# Patient Record
Sex: FEMALE | Race: WHITE | NOT HISPANIC OR LATINO
[De-identification: names, ages, dates, MRNs, and addresses within clinical notes are randomized per-mention and may not be internally consistent; named-entity substitution may affect disease eponyms.]

---

## 2017-01-03 ENCOUNTER — RX RENEWAL (OUTPATIENT)
Age: 82
End: 2017-01-03

## 2017-05-05 ENCOUNTER — APPOINTMENT (OUTPATIENT)
Dept: FAMILY MEDICINE | Facility: CLINIC | Age: 82
End: 2017-05-05

## 2017-05-05 VITALS
TEMPERATURE: 98.7 F | DIASTOLIC BLOOD PRESSURE: 68 MMHG | HEART RATE: 72 BPM | OXYGEN SATURATION: 96 % | SYSTOLIC BLOOD PRESSURE: 140 MMHG

## 2017-05-05 DIAGNOSIS — M81.0 AGE-RELATED OSTEOPOROSIS W/OUT CURRENT PATHOLOGICAL FRACTURE: ICD-10-CM

## 2017-05-09 ENCOUNTER — RESULT REVIEW (OUTPATIENT)
Age: 82
End: 2017-05-09

## 2017-05-09 LAB
25(OH)D3 SERPL-MCNC: 18.8 NG/ML
ALBUMIN SERPL ELPH-MCNC: 3.9 G/DL
ALP BLD-CCNC: 87 U/L
ALT SERPL-CCNC: 11 U/L
ANION GAP SERPL CALC-SCNC: 11 MMOL/L
AST SERPL-CCNC: 25 U/L
BASOPHILS # BLD AUTO: 0.05 K/UL
BASOPHILS NFR BLD AUTO: 1 %
BILIRUB SERPL-MCNC: 0.4 MG/DL
BUN SERPL-MCNC: 20 MG/DL
CALCIUM SERPL-MCNC: 9.3 MG/DL
CHLORIDE SERPL-SCNC: 103 MMOL/L
CHOLEST SERPL-MCNC: 206 MG/DL
CHOLEST/HDLC SERPL: 2.7 RATIO
CO2 SERPL-SCNC: 27 MMOL/L
CREAT SERPL-MCNC: 0.93 MG/DL
EOSINOPHIL # BLD AUTO: 0.16 K/UL
EOSINOPHIL NFR BLD AUTO: 3.2 %
FERRITIN SERPL-MCNC: 52.7 NG/ML
FOLATE SERPL-MCNC: >20 NG/ML
GLUCOSE SERPL-MCNC: 102 MG/DL
HBA1C MFR BLD HPLC: 6 %
HCT VFR BLD CALC: 40.1 %
HDLC SERPL-MCNC: 77 MG/DL
HGB BLD-MCNC: 11.9 G/DL
IMM GRANULOCYTES NFR BLD AUTO: 0.2 %
IRON SATN MFR SERPL: 13 %
IRON SERPL-MCNC: 33 UG/DL
LDLC SERPL CALC-MCNC: 109 MG/DL
LYMPHOCYTES # BLD AUTO: 1.76 K/UL
LYMPHOCYTES NFR BLD AUTO: 35.1 %
MAN DIFF?: NORMAL
MCHC RBC-ENTMCNC: 26.2 PG
MCHC RBC-ENTMCNC: 29.7 GM/DL
MCV RBC AUTO: 88.3 FL
MONOCYTES # BLD AUTO: 0.61 K/UL
MONOCYTES NFR BLD AUTO: 12.2 %
NEUTROPHILS # BLD AUTO: 2.42 K/UL
NEUTROPHILS NFR BLD AUTO: 48.3 %
PLATELET # BLD AUTO: 232 K/UL
POTASSIUM SERPL-SCNC: 4.7 MMOL/L
PROT SERPL-MCNC: 8 G/DL
RBC # BLD: 4.54 M/UL
RBC # FLD: 17.4 %
SODIUM SERPL-SCNC: 141 MMOL/L
TIBC SERPL-MCNC: 252 UG/DL
TRIGL SERPL-MCNC: 99 MG/DL
TSH SERPL-ACNC: 3.97 UIU/ML
UIBC SERPL-MCNC: 219 UG/DL
VIT B12 SERPL-MCNC: 351 PG/ML
WBC # FLD AUTO: 5.01 K/UL

## 2017-05-19 ENCOUNTER — INPATIENT (INPATIENT)
Facility: HOSPITAL | Age: 82
LOS: 2 days | Discharge: HOME | DRG: 689 | End: 2017-05-22
Attending: HOSPITALIST | Admitting: HOSPITALIST
Payer: MEDICARE

## 2017-05-19 VITALS
TEMPERATURE: 98 F | OXYGEN SATURATION: 99 % | DIASTOLIC BLOOD PRESSURE: 71 MMHG | HEIGHT: 65 IN | WEIGHT: 184.97 LBS | SYSTOLIC BLOOD PRESSURE: 176 MMHG | RESPIRATION RATE: 18 BRPM | HEART RATE: 91 BPM

## 2017-05-19 LAB
ALBUMIN SERPL ELPH-MCNC: 3.5 G/DL — SIGNIFICANT CHANGE UP (ref 3.3–5.2)
ALP SERPL-CCNC: 80 U/L — SIGNIFICANT CHANGE UP (ref 40–120)
ALT FLD-CCNC: 16 U/L — SIGNIFICANT CHANGE UP
ANION GAP SERPL CALC-SCNC: 15 MMOL/L — SIGNIFICANT CHANGE UP (ref 5–17)
APPEARANCE UR: ABNORMAL
AST SERPL-CCNC: 63 U/L — HIGH
BACTERIA # UR AUTO: ABNORMAL
BASOPHILS # BLD AUTO: 0 K/UL — SIGNIFICANT CHANGE UP (ref 0–0.2)
BASOPHILS NFR BLD AUTO: 0.1 % — SIGNIFICANT CHANGE UP (ref 0–2)
BILIRUB SERPL-MCNC: 0.6 MG/DL — SIGNIFICANT CHANGE UP (ref 0.4–2)
BILIRUB UR-MCNC: NEGATIVE — SIGNIFICANT CHANGE UP
BUN SERPL-MCNC: 15 MG/DL — SIGNIFICANT CHANGE UP (ref 8–20)
CALCIUM SERPL-MCNC: 8.4 MG/DL — LOW (ref 8.6–10.2)
CHLORIDE SERPL-SCNC: 96 MMOL/L — LOW (ref 98–107)
CO2 SERPL-SCNC: 23 MMOL/L — SIGNIFICANT CHANGE UP (ref 22–29)
COLOR SPEC: YELLOW — SIGNIFICANT CHANGE UP
CREAT SERPL-MCNC: 0.89 MG/DL — SIGNIFICANT CHANGE UP (ref 0.5–1.3)
DIFF PNL FLD: ABNORMAL
EPI CELLS # UR: ABNORMAL
GLUCOSE SERPL-MCNC: 119 MG/DL — HIGH (ref 70–115)
GLUCOSE UR QL: NEGATIVE MG/DL — SIGNIFICANT CHANGE UP
GRAN CASTS # UR COMP ASSIST: ABNORMAL /LPF
HCT VFR BLD CALC: 38.9 % — SIGNIFICANT CHANGE UP (ref 37–47)
HGB BLD-MCNC: 12.4 G/DL — SIGNIFICANT CHANGE UP (ref 12–16)
HYALINE CASTS # UR AUTO: ABNORMAL /LPF
KETONES UR-MCNC: ABNORMAL
LACTATE BLDV-MCNC: 1.3 MMOL/L — SIGNIFICANT CHANGE UP (ref 0.5–2)
LEUKOCYTE ESTERASE UR-ACNC: ABNORMAL
LYMPHOCYTES # BLD AUTO: 1 K/UL — SIGNIFICANT CHANGE UP (ref 1–4.8)
LYMPHOCYTES # BLD AUTO: 14.5 % — LOW (ref 20–55)
MCHC RBC-ENTMCNC: 27.3 PG — SIGNIFICANT CHANGE UP (ref 27–31)
MCHC RBC-ENTMCNC: 31.9 G/DL — LOW (ref 32–36)
MCV RBC AUTO: 85.5 FL — SIGNIFICANT CHANGE UP (ref 81–99)
MONOCYTES # BLD AUTO: 0.9 K/UL — HIGH (ref 0–0.8)
MONOCYTES NFR BLD AUTO: 12.4 % — HIGH (ref 3–10)
NEUTROPHILS # BLD AUTO: 5.2 K/UL — SIGNIFICANT CHANGE UP (ref 1.8–8)
NEUTROPHILS NFR BLD AUTO: 72.7 % — SIGNIFICANT CHANGE UP (ref 37–73)
NITRITE UR-MCNC: NEGATIVE — SIGNIFICANT CHANGE UP
PH UR: 5 — SIGNIFICANT CHANGE UP (ref 5–8)
PLATELET # BLD AUTO: 170 K/UL — SIGNIFICANT CHANGE UP (ref 150–400)
POTASSIUM SERPL-MCNC: 3.9 MMOL/L — SIGNIFICANT CHANGE UP (ref 3.5–5.3)
POTASSIUM SERPL-SCNC: 3.9 MMOL/L — SIGNIFICANT CHANGE UP (ref 3.5–5.3)
PROT SERPL-MCNC: 7.7 G/DL — SIGNIFICANT CHANGE UP (ref 6.6–8.7)
PROT UR-MCNC: 100 MG/DL
RBC # BLD: 4.55 M/UL — SIGNIFICANT CHANGE UP (ref 4.4–5.2)
RBC # FLD: 17.1 % — HIGH (ref 11–15.6)
RBC CASTS # UR COMP ASSIST: ABNORMAL /HPF (ref 0–4)
SODIUM SERPL-SCNC: 134 MMOL/L — LOW (ref 135–145)
SP GR SPEC: 1.02 — SIGNIFICANT CHANGE UP (ref 1.01–1.02)
UROBILINOGEN FLD QL: NEGATIVE MG/DL — SIGNIFICANT CHANGE UP
WBC # BLD: 7.1 K/UL — SIGNIFICANT CHANGE UP (ref 4.8–10.8)
WBC # FLD AUTO: 7.1 K/UL — SIGNIFICANT CHANGE UP (ref 4.8–10.8)
WBC UR QL: ABNORMAL

## 2017-05-19 PROCEDURE — 70450 CT HEAD/BRAIN W/O DYE: CPT | Mod: 26

## 2017-05-19 PROCEDURE — 72125 CT NECK SPINE W/O DYE: CPT | Mod: 26

## 2017-05-19 PROCEDURE — 73110 X-RAY EXAM OF WRIST: CPT | Mod: 26,RT

## 2017-05-19 PROCEDURE — 93010 ELECTROCARDIOGRAM REPORT: CPT

## 2017-05-19 PROCEDURE — 73090 X-RAY EXAM OF FOREARM: CPT | Mod: 26,LT

## 2017-05-19 PROCEDURE — 71010: CPT | Mod: 26

## 2017-05-19 RX ORDER — SODIUM CHLORIDE 9 MG/ML
1000 INJECTION INTRAMUSCULAR; INTRAVENOUS; SUBCUTANEOUS ONCE
Qty: 0 | Refills: 0 | Status: COMPLETED | OUTPATIENT
Start: 2017-05-19 | End: 2017-05-19

## 2017-05-19 RX ORDER — ASPIRIN/CALCIUM CARB/MAGNESIUM 324 MG
324 TABLET ORAL DAILY
Qty: 0 | Refills: 0 | Status: DISCONTINUED | OUTPATIENT
Start: 2017-05-19 | End: 2017-05-21

## 2017-05-19 RX ORDER — SODIUM CHLORIDE 9 MG/ML
3 INJECTION INTRAMUSCULAR; INTRAVENOUS; SUBCUTANEOUS ONCE
Qty: 0 | Refills: 0 | Status: COMPLETED | OUTPATIENT
Start: 2017-05-19 | End: 2017-05-19

## 2017-05-19 RX ORDER — SODIUM CHLORIDE 9 MG/ML
1000 INJECTION INTRAMUSCULAR; INTRAVENOUS; SUBCUTANEOUS
Qty: 0 | Refills: 0 | Status: DISCONTINUED | OUTPATIENT
Start: 2017-05-19 | End: 2017-05-21

## 2017-05-19 RX ADMIN — SODIUM CHLORIDE 3 MILLILITER(S): 9 INJECTION INTRAMUSCULAR; INTRAVENOUS; SUBCUTANEOUS at 21:22

## 2017-05-19 RX ADMIN — SODIUM CHLORIDE 1000 MILLILITER(S): 9 INJECTION INTRAMUSCULAR; INTRAVENOUS; SUBCUTANEOUS at 21:57

## 2017-05-19 RX ADMIN — SODIUM CHLORIDE 150 MILLILITER(S): 9 INJECTION INTRAMUSCULAR; INTRAVENOUS; SUBCUTANEOUS at 21:57

## 2017-05-19 NOTE — ED ADULT NURSE NOTE - OBJECTIVE STATEMENT
Assumed patient care at 2100.  Pt reports losing her balance in the bathroom today.  She fell forward into the tub.  She could not get up for four hours. She denies hitting her head and denies LOC.  She is c/o right wrist pain and left scapula pain.  Has had diarrhea for the last three days and a 101.0 temp this morning.  Clear bilateral lung sounds.  Ecchymosis present to right wrist with no swelling.  No signs of injury present to left scapula.  Moves all four extremities without difficulty.

## 2017-05-19 NOTE — ED PROVIDER NOTE - NS ED MD SCRIBE ATTENDING SCRIBE SECTIONS
HISTORY OF PRESENT ILLNESS/VITAL SIGNS( Pullset)/REVIEW OF SYSTEMS/PAST MEDICAL/SURGICAL/SOCIAL HISTORY/DISPOSITION/PHYSICAL EXAM

## 2017-05-19 NOTE — ED ADULT TRIAGE NOTE - CHIEF COMPLAINT QUOTE
States after getting off the toilet, slipped and fell into bath tub. Denies head injury, or visible external trauma present. C/O right wrist. Denies blood thinners. GCS 15. NOE with strength and purpose. Denies HA or visual complaints. Respirations even and unlabored. Denies neck or head pain.

## 2017-05-19 NOTE — ED PROVIDER NOTE - OBJECTIVE STATEMENT
89 y/o F pt with PMHx of CHF and CAD presents to ED c/o right wrist pain s/p mechanical fall earlier today. As per daughter, she was in the bathroom and got up too fast and fell into the bathtub. Pt reports she did not hit her head and denies LOC. She currently takes Lasix and Baby Aspirin daily. As per son, she has had diarrhea x3 days and a 101 fever this am. She also c/o b/l leg weakness. Pt denies CP, SOB, nausea, vomiting, abdominal pain, back pain, and headache. No further complaints at this time. NKDA.

## 2017-05-19 NOTE — ED ADULT NURSE NOTE - PMH
CHF (congestive heart failure)    Coronary artery disease involving native coronary artery of native heart without angina pectoris

## 2017-05-20 DIAGNOSIS — I21.4 NON-ST ELEVATION (NSTEMI) MYOCARDIAL INFARCTION: ICD-10-CM

## 2017-05-20 LAB
C DIFF BY PCR RESULT: DETECTED
C DIFF TOX GENS STL QL NAA+PROBE: SIGNIFICANT CHANGE UP
CK MB CFR SERPL CALC: 12 NG/ML — HIGH (ref 0–6.7)
CK MB CFR SERPL CALC: 12.9 NG/ML — HIGH (ref 0–6.7)
CK SERPL-CCNC: 1579 U/L — HIGH (ref 25–170)
CK SERPL-CCNC: 1839 U/L — HIGH (ref 25–170)
TROPONIN T SERPL-MCNC: 0.09 NG/ML — HIGH (ref 0–0.06)

## 2017-05-20 PROCEDURE — 99291 CRITICAL CARE FIRST HOUR: CPT

## 2017-05-20 PROCEDURE — 99223 1ST HOSP IP/OBS HIGH 75: CPT

## 2017-05-20 RX ORDER — METRONIDAZOLE 500 MG
TABLET ORAL
Qty: 0 | Refills: 0 | Status: DISCONTINUED | OUTPATIENT
Start: 2017-05-20 | End: 2017-05-22

## 2017-05-20 RX ORDER — LACTOBACILLUS ACIDOPHILUS 100MM CELL
1 CAPSULE ORAL EVERY 12 HOURS
Qty: 0 | Refills: 0 | Status: DISCONTINUED | OUTPATIENT
Start: 2017-05-20 | End: 2017-05-22

## 2017-05-20 RX ORDER — METRONIDAZOLE 500 MG
500 TABLET ORAL ONCE
Qty: 0 | Refills: 0 | Status: COMPLETED | OUTPATIENT
Start: 2017-05-20 | End: 2017-05-20

## 2017-05-20 RX ORDER — METRONIDAZOLE 500 MG
500 TABLET ORAL EVERY 8 HOURS
Qty: 0 | Refills: 0 | Status: DISCONTINUED | OUTPATIENT
Start: 2017-05-20 | End: 2017-05-22

## 2017-05-20 RX ORDER — ENOXAPARIN SODIUM 100 MG/ML
30 INJECTION SUBCUTANEOUS EVERY 24 HOURS
Qty: 0 | Refills: 0 | Status: DISCONTINUED | OUTPATIENT
Start: 2017-05-20 | End: 2017-05-22

## 2017-05-20 RX ORDER — CEFTRIAXONE 500 MG/1
1 INJECTION, POWDER, FOR SOLUTION INTRAMUSCULAR; INTRAVENOUS ONCE
Qty: 0 | Refills: 0 | Status: COMPLETED | OUTPATIENT
Start: 2017-05-20 | End: 2017-05-20

## 2017-05-20 RX ORDER — FUROSEMIDE 40 MG
20 TABLET ORAL DAILY
Qty: 0 | Refills: 0 | Status: DISCONTINUED | OUTPATIENT
Start: 2017-05-20 | End: 2017-05-20

## 2017-05-20 RX ORDER — CEFTRIAXONE 500 MG/1
1 INJECTION, POWDER, FOR SOLUTION INTRAMUSCULAR; INTRAVENOUS EVERY 24 HOURS
Qty: 0 | Refills: 0 | Status: DISCONTINUED | OUTPATIENT
Start: 2017-05-21 | End: 2017-05-22

## 2017-05-20 RX ORDER — FOLIC ACID 0.8 MG
1 TABLET ORAL DAILY
Qty: 0 | Refills: 0 | Status: DISCONTINUED | OUTPATIENT
Start: 2017-05-20 | End: 2017-05-22

## 2017-05-20 RX ORDER — CEFTRIAXONE 500 MG/1
INJECTION, POWDER, FOR SOLUTION INTRAMUSCULAR; INTRAVENOUS
Qty: 0 | Refills: 0 | Status: DISCONTINUED | OUTPATIENT
Start: 2017-05-20 | End: 2017-05-22

## 2017-05-20 RX ADMIN — SODIUM CHLORIDE 150 MILLILITER(S): 9 INJECTION INTRAMUSCULAR; INTRAVENOUS; SUBCUTANEOUS at 07:08

## 2017-05-20 RX ADMIN — Medication 1 MILLIGRAM(S): at 13:04

## 2017-05-20 RX ADMIN — Medication 20 MILLIGRAM(S): at 13:04

## 2017-05-20 RX ADMIN — ENOXAPARIN SODIUM 30 MILLIGRAM(S): 100 INJECTION SUBCUTANEOUS at 13:03

## 2017-05-20 RX ADMIN — Medication 324 MILLIGRAM(S): at 00:51

## 2017-05-20 RX ADMIN — Medication 100 MILLIGRAM(S): at 21:51

## 2017-05-20 RX ADMIN — Medication 1 TABLET(S): at 18:59

## 2017-05-20 RX ADMIN — Medication 324 MILLIGRAM(S): at 13:04

## 2017-05-20 RX ADMIN — CEFTRIAXONE 100 GRAM(S): 500 INJECTION, POWDER, FOR SOLUTION INTRAMUSCULAR; INTRAVENOUS at 07:07

## 2017-05-20 RX ADMIN — Medication 100 MILLIGRAM(S): at 13:23

## 2017-05-20 NOTE — PROGRESS NOTE ADULT - ASSESSMENT
This is 89 year old female status post mechanical fall while attempting to use restroom, admitted for  UTI, rhabdomyolysis secondary to fall, with mildly elevated troponin secondary to demand ischemia.      A/P    >UTI -   IV Rocephin  follow up urine culture  PT evaluation     >Mildly abnormal troponin, trending down, denied chest pain  TTE     >C diff - start flagyl, contact precaution     >rhabdo - CPK trending down   patient as h/o HF - no ECHO on file, will hold off IV hydration until ECHO, CPK is down trending and hydration will worsen her LE edema,  hold lasix for now ,  encourage po intake     >DVT prophylaxis -Lovenox

## 2017-05-20 NOTE — PROGRESS NOTE ADULT - SUBJECTIVE AND OBJECTIVE BOX
Internal Medicine Hospitalist - Dr. Lacho SANDOVAL    [id number]    89y      Female    Patient is a 89y old  Female who presents with a chief complaint of status post slip and fall (20 May 2017 00:07)    HPI:  This is a 89 year old female who slipped and fell while walking in her bathroom hitting her back, no LOC, she currently denies any complaints other than difficulty with ambulation. While in ER patient was found to have elevated CPK, troponin and UTI. (20 May 2017 00:07)    INTERVAL HPI/ OVERNIGHT EVENTS: Patient is seen and examined, report feeling better     PHYSICAL EXAM:    Vital Signs Last 24 Hrs  T(C): 36.9, Max: 36.9 (05-20 @ 13:01)  T(F): 98.5, Max: 98.5 (05-20 @ 13:01)  HR: 79 (78 - 98)  BP: 140/65 (113/57 - 176/71)  BP(mean): --  RR: 20 (18 - 20)  SpO2: 97% (96% - 99%)    GENERAL: NAD  HEENT: EOMI  Neck: supple  CHEST/LUNG: positive air entry   HEART: S1S2+ audible  ABDOMEN: Soft, Nontender, Nondistended; Bowel sounds present  EXTREMITIES:  positive edema  edema  CNS: AAO X 3  Psychiatry: normal mood    LABS:                        12.4   7.1   )-----------( 170      ( 19 May 2017 21:37 )             38.9     05-19    134<L>  |  96<L>  |  15.0  ----------------------------<  119<H>  3.9   |  23.0  |  0.89    Ca    8.4<L>      19 May 2017 21:37    TPro  7.7  /  Alb  3.5  /  TBili  0.6  /  DBili  x   /  AST  63<H>  /  ALT  16  /  AlkPhos  80  05-19    Clostridium difficile Toxin by PCR (05.19.17 @ 22:05)  Clostridium difficile Toxin by PCR: RESULT INTERPRETATION:    Detected - Clostridium difficile toxin B detected by amplified DNA PCR .    MEDICATIONS  (STANDING):  sodium chloride 0.9%. 1000milliLiter(s) IV Continuous <Continuous>  aspirin  chewable 324milliGRAM(s) Oral daily  cefTRIAXone   IVPB  IV Intermittent   furosemide    Tablet 20milliGRAM(s) Oral daily  lactobacillus acidophilus 1Tablet(s) Oral every 12 hours  folic acid 1milliGRAM(s) Oral daily  enoxaparin Injectable 30milliGRAM(s) SubCutaneous every 24 hours  metroNIDAZOLE  IVPB  IV Intermittent   metroNIDAZOLE  IVPB 500milliGRAM(s) IV Intermittent every 8 hours    MEDICATIONS  (PRN):      RADIOLOGY & ADDITIONAL TEST

## 2017-05-20 NOTE — H&P ADULT - ASSESSMENT
89 year old female status post mechanical fall while attempting to use restroom, found to have UTI, rhabdomyolysis secondary to fall, with mildly elevated troponin secondary to demand ischemia.    Admit to medical unit  IV Rocephin  follow up urine culture  PT evaluation   resume home medications  OOB only with assistance  patient as h/o HF - no ECHO on file, will hold off IV hydration until ECHO, CPK is down trending and hydration will worsen her LE edema.   DVT prophylaxis

## 2017-05-20 NOTE — H&P ADULT - HISTORY OF PRESENT ILLNESS
This is a 89 year old female who slipped and fell while walking in her bathroom hitting her back, no LOC, she currently denies any complaints other than difficulty with ambulation. While in ER patient was found to have elevated CPK, troponin and UTI.

## 2017-05-20 NOTE — H&P ADULT - NSHPPHYSICALEXAM_GEN_ALL_CORE
Vital Signs Last 24 Hrs  T(C): 36.8, Max: 36.8 (05-19 @ 21:45)  T(F): 98.3, Max: 98.3 (05-19 @ 21:45)  HR: 98 (91 - 98)  BP: 170/75 (170/75 - 176/71)  BP(mean): --  RR: 18 (18 - 18)  SpO2: 98% (98% - 99%)    Constitutional/General: Well developed, well nourished, vitals reviewed  EYE: PERRL, conjunctiva clear  ENT: Good dentition, oropharynx clear  NECK: No masses, thyroid normal  RESP: CTAB, no wheezes, no rhonchi, normal effort  CV: RRR, normal S1S2, no murmur, 2+ DP pulses, no JVD, no edema  ABDOMEN: Soft, nontender, no HSM  LYMPH: No cervical or supraclavicular adenopathy  SKIN: Warm, dry, no rashes  NEURO: CN II-XII intact grossly, sensation intact  PSYCHIATRIC: Oriented x3, normal mood and affect Vital Signs Last 24 Hrs  T(C): 36.8, Max: 36.8 (05-19 @ 21:45)  T(F): 98.3, Max: 98.3 (05-19 @ 21:45)  HR: 98 (91 - 98)  BP: 170/75 (170/75 - 176/71)  BP(mean): --  RR: 18 (18 - 18)  SpO2: 98% (98% - 99%)    Constitutional/General: frail elderly female, in no distress, 2 person assist to ambulate with significantly reduced balance. vitals reviewed  EYE: PERRL, conjunctiva clear  ENT: Good dentition, oropharynx clear  NECK: No masses, thyroid normal  RESP: CTAB, no wheezes, no rhonchi, normal effort  CV: RRR, normal S1S2, + murmur, 2+ DP pulses, no JVD, bilateral LE compression bandaged in place  ABDOMEN: Soft, nontender, no HSM, multiple ecchymosis on back  LYMPH: No cervical or supraclavicular adenopathy  SKIN: Warm, dry, no rashes  NEURO: sensation intact  PSYCHIATRIC: Oriented x3, normal mood and affect

## 2017-05-21 LAB
ANION GAP SERPL CALC-SCNC: 12 MMOL/L — SIGNIFICANT CHANGE UP (ref 5–17)
BUN SERPL-MCNC: 18 MG/DL — SIGNIFICANT CHANGE UP (ref 8–20)
CALCIUM SERPL-MCNC: 7.8 MG/DL — LOW (ref 8.6–10.2)
CHLORIDE SERPL-SCNC: 106 MMOL/L — SIGNIFICANT CHANGE UP (ref 98–107)
CK MB CFR SERPL CALC: 14.2 NG/ML — HIGH (ref 0–6.7)
CK SERPL-CCNC: 1327 U/L — HIGH (ref 25–170)
CO2 SERPL-SCNC: 19 MMOL/L — LOW (ref 22–29)
CREAT SERPL-MCNC: 0.7 MG/DL — SIGNIFICANT CHANGE UP (ref 0.5–1.3)
GLUCOSE SERPL-MCNC: 98 MG/DL — SIGNIFICANT CHANGE UP (ref 70–115)
HCT VFR BLD CALC: 36.7 % — LOW (ref 37–47)
HGB BLD-MCNC: 11.5 G/DL — LOW (ref 12–16)
MCHC RBC-ENTMCNC: 26.7 PG — LOW (ref 27–31)
MCHC RBC-ENTMCNC: 31.3 G/DL — LOW (ref 32–36)
MCV RBC AUTO: 85.2 FL — SIGNIFICANT CHANGE UP (ref 81–99)
PLATELET # BLD AUTO: 163 K/UL — SIGNIFICANT CHANGE UP (ref 150–400)
POTASSIUM SERPL-MCNC: 3.2 MMOL/L — LOW (ref 3.5–5.3)
POTASSIUM SERPL-SCNC: 3.2 MMOL/L — LOW (ref 3.5–5.3)
RBC # BLD: 4.31 M/UL — LOW (ref 4.4–5.2)
RBC # FLD: 17 % — HIGH (ref 11–15.6)
SODIUM SERPL-SCNC: 137 MMOL/L — SIGNIFICANT CHANGE UP (ref 135–145)
WBC # BLD: 5.2 K/UL — SIGNIFICANT CHANGE UP (ref 4.8–10.8)
WBC # FLD AUTO: 5.2 K/UL — SIGNIFICANT CHANGE UP (ref 4.8–10.8)

## 2017-05-21 RX ORDER — POTASSIUM CHLORIDE 20 MEQ
40 PACKET (EA) ORAL EVERY 4 HOURS
Qty: 0 | Refills: 0 | Status: COMPLETED | OUTPATIENT
Start: 2017-05-21 | End: 2017-05-21

## 2017-05-21 RX ORDER — COLLAGENASE CLOSTRIDIUM HIST. 250 UNIT/G
1 OINTMENT (GRAM) TOPICAL THREE TIMES A DAY
Qty: 0 | Refills: 0 | Status: DISCONTINUED | OUTPATIENT
Start: 2017-05-21 | End: 2017-05-22

## 2017-05-21 RX ORDER — ASPIRIN/CALCIUM CARB/MAGNESIUM 324 MG
81 TABLET ORAL DAILY
Qty: 0 | Refills: 0 | Status: DISCONTINUED | OUTPATIENT
Start: 2017-05-21 | End: 2017-05-22

## 2017-05-21 RX ADMIN — Medication 1 TABLET(S): at 17:57

## 2017-05-21 RX ADMIN — Medication 100 MILLIGRAM(S): at 14:05

## 2017-05-21 RX ADMIN — Medication 100 MILLIGRAM(S): at 06:47

## 2017-05-21 RX ADMIN — Medication 40 MILLIEQUIVALENT(S): at 14:06

## 2017-05-21 RX ADMIN — Medication 100 MILLIGRAM(S): at 22:12

## 2017-05-21 RX ADMIN — Medication 1 MILLIGRAM(S): at 12:22

## 2017-05-21 RX ADMIN — Medication 1 TABLET(S): at 06:47

## 2017-05-21 RX ADMIN — Medication 81 MILLIGRAM(S): at 12:22

## 2017-05-21 RX ADMIN — Medication 40 MILLIEQUIVALENT(S): at 11:29

## 2017-05-21 RX ADMIN — CEFTRIAXONE 100 GRAM(S): 500 INJECTION, POWDER, FOR SOLUTION INTRAMUSCULAR; INTRAVENOUS at 06:02

## 2017-05-21 RX ADMIN — ENOXAPARIN SODIUM 30 MILLIGRAM(S): 100 INJECTION SUBCUTANEOUS at 12:22

## 2017-05-21 NOTE — PROGRESS NOTE ADULT - ASSESSMENT
This is 89 year old female status post mechanical fall while attempting to use restroom, admitted for  UTI, rhabdomyolysis secondary to fall, with mildly elevated troponin secondary to demand ischemia.      A/P    >UTI -   IV Rocephin  follow up urine culture    >Mildly abnormal troponin, trending down, denied chest pain  TTE   --> secondary to demand ischemia and not a MI    >C diff - c/w flagyl     >rhabdo - CPK trending down     >DVT prophylaxis -Lovenox

## 2017-05-21 NOTE — PROGRESS NOTE ADULT - SUBJECTIVE AND OBJECTIVE BOX
BRIAN SANDOVAL    [id number]    89y      Female    INTERVAL HPI/OVERNIGHT EVENTS:    patient being seen for uti and c dif and rhabdo. Patient seen at bedside and denies any complaints.     last 24 hours patient is afebrile.       REVIEW OF SYSTEMS:    CONSTITUTIONAL: No fever, weight loss, or fatigue  RESPIRATORY: No cough, wheezing, hemoptysis; No shortness of breath  CARDIOVASCULAR: No chest pain, palpitations  GASTROINTESTINAL: No abdominal or epigastric pain. No nausea, vomiting  NEUROLOGICAL: No headaches, memory loss, loss of strength.  MISCELLANEOUS:      Vital Signs Last 24 Hrs  T(C): 36.6, Max: 36.7 (05-20 @ 17:00)  T(F): 97.9, Max: 98 (05-20 @ 17:00)  HR: 67 (67 - 78)  BP: 152/65 (123/59 - 152/65)  BP(mean): --  RR: 20 (18 - 20)  SpO2: 96% (95% - 96%)    PHYSICAL EXAM:    GENERAL: NAD,   HEENT: PERRL, +EOMI  NECK: soft, Supple, No JVD,   CHEST/LUNG: Clear to percussion bilaterally; No wheezing  HEART: S1S2+, Regular rate and rhythm; No murmurs  ABDOMEN: Soft, Nontender, Nondistended;  EXTREMITIES:  edema, bandaged   SKIN: No rashes or lesions  NEURO: AAOX3, no focal deficits,  PSYCH: normal mood      LABS:                        11.5   5.2   )-----------( 163      ( 21 May 2017 05:50 )             36.7     -    137  |  106  |  18.0  ----------------------------<  98  3.2<L>   |  19.0<L>  |  0.70    Ca    7.8<L>      21 May 2017 05:50    TPro  7.7  /  Alb  3.5  /  TBili  0.6  /  DBili  x   /  AST  63<H>  /  ALT  16  /  AlkPhos  80        Urinalysis Basic - ( 19 May 2017 22:06 )    Color: Yellow / Appearance: very cloudy / S.020 / pH: x  Gluc: x / Ketone: Trace  / Bili: Negative / Urobili: Negative mg/dL   Blood: x / Protein: 100 mg/dL / Nitrite: Negative   Leuk Esterase: Moderate / RBC: 3-5 /HPF / WBC 11-25   Sq Epi: x / Non Sq Epi: Many / Bacteria: Many        MEDICATIONS  (STANDING):  cefTRIAXone   IVPB  IV Intermittent   cefTRIAXone   IVPB 1Gram(s) IV Intermittent every 24 hours  lactobacillus acidophilus 1Tablet(s) Oral every 12 hours  folic acid 1milliGRAM(s) Oral daily  enoxaparin Injectable 30milliGRAM(s) SubCutaneous every 24 hours  metroNIDAZOLE  IVPB  IV Intermittent   metroNIDAZOLE  IVPB 500milliGRAM(s) IV Intermittent every 8 hours  potassium chloride    Tablet ER 40milliEquivalent(s) Oral every 4 hours  aspirin  chewable 81milliGRAM(s) Oral daily  collagenase Ointment 1Application(s) Topical three times a day    MEDICATIONS  (PRN):      RADIOLOGY & ADDITIONAL TESTS:    tte: ordered

## 2017-05-22 ENCOUNTER — TRANSCRIPTION ENCOUNTER (OUTPATIENT)
Age: 82
End: 2017-05-22

## 2017-05-22 VITALS — HEART RATE: 82 BPM | TEMPERATURE: 99 F | SYSTOLIC BLOOD PRESSURE: 110 MMHG | DIASTOLIC BLOOD PRESSURE: 60 MMHG

## 2017-05-22 LAB
-  AMPICILLIN/SULBACTAM: SIGNIFICANT CHANGE UP
-  CEFAZOLIN: SIGNIFICANT CHANGE UP
-  CIPROFLOXACIN: SIGNIFICANT CHANGE UP
-  DAPTOMYCIN: SIGNIFICANT CHANGE UP
-  GENTAMICIN: SIGNIFICANT CHANGE UP
-  LEVOFLOXACIN: SIGNIFICANT CHANGE UP
-  LINEZOLID: SIGNIFICANT CHANGE UP
-  NITROFURANTOIN: SIGNIFICANT CHANGE UP
-  OXACILLIN: SIGNIFICANT CHANGE UP
-  PENICILLIN: SIGNIFICANT CHANGE UP
-  RIFAMPIN: SIGNIFICANT CHANGE UP
-  TETRACYCLINE: SIGNIFICANT CHANGE UP
-  TRIMETHOPRIM/SULFAMETHOXAZOLE: SIGNIFICANT CHANGE UP
-  VANCOMYCIN: SIGNIFICANT CHANGE UP
ANION GAP SERPL CALC-SCNC: 11 MMOL/L — SIGNIFICANT CHANGE UP (ref 5–17)
BUN SERPL-MCNC: 13 MG/DL — SIGNIFICANT CHANGE UP (ref 8–20)
CALCIUM SERPL-MCNC: 8 MG/DL — LOW (ref 8.6–10.2)
CHLORIDE SERPL-SCNC: 108 MMOL/L — HIGH (ref 98–107)
CO2 SERPL-SCNC: 22 MMOL/L — SIGNIFICANT CHANGE UP (ref 22–29)
CREAT SERPL-MCNC: 0.65 MG/DL — SIGNIFICANT CHANGE UP (ref 0.5–1.3)
CULTURE RESULTS: SIGNIFICANT CHANGE UP
GLUCOSE SERPL-MCNC: 85 MG/DL — SIGNIFICANT CHANGE UP (ref 70–115)
HCT VFR BLD CALC: 35.8 % — LOW (ref 37–47)
HGB BLD-MCNC: 11 G/DL — LOW (ref 12–16)
MAGNESIUM SERPL-MCNC: 2 MG/DL — SIGNIFICANT CHANGE UP (ref 1.6–2.6)
MCHC RBC-ENTMCNC: 26.4 PG — LOW (ref 27–31)
MCHC RBC-ENTMCNC: 30.7 G/DL — LOW (ref 32–36)
MCV RBC AUTO: 86.1 FL — SIGNIFICANT CHANGE UP (ref 81–99)
METHOD TYPE: SIGNIFICANT CHANGE UP
ORGANISM # SPEC MICROSCOPIC CNT: SIGNIFICANT CHANGE UP
ORGANISM # SPEC MICROSCOPIC CNT: SIGNIFICANT CHANGE UP
PLATELET # BLD AUTO: 176 K/UL — SIGNIFICANT CHANGE UP (ref 150–400)
POTASSIUM SERPL-MCNC: 3.8 MMOL/L — SIGNIFICANT CHANGE UP (ref 3.5–5.3)
POTASSIUM SERPL-SCNC: 3.8 MMOL/L — SIGNIFICANT CHANGE UP (ref 3.5–5.3)
RBC # BLD: 4.16 M/UL — LOW (ref 4.4–5.2)
RBC # FLD: 16.9 % — HIGH (ref 11–15.6)
SODIUM SERPL-SCNC: 141 MMOL/L — SIGNIFICANT CHANGE UP (ref 135–145)
SPECIMEN SOURCE: SIGNIFICANT CHANGE UP
WBC # BLD: 4.8 K/UL — SIGNIFICANT CHANGE UP (ref 4.8–10.8)
WBC # FLD AUTO: 4.8 K/UL — SIGNIFICANT CHANGE UP (ref 4.8–10.8)

## 2017-05-22 PROCEDURE — 93306 TTE W/DOPPLER COMPLETE: CPT | Mod: 26

## 2017-05-22 RX ORDER — ASPIRIN/CALCIUM CARB/MAGNESIUM 324 MG
1 TABLET ORAL
Qty: 0 | Refills: 0 | DISCHARGE
Start: 2017-05-22

## 2017-05-22 RX ORDER — CIPROFLOXACIN LACTATE 400MG/40ML
1 VIAL (ML) INTRAVENOUS
Qty: 3 | Refills: 0 | OUTPATIENT
Start: 2017-05-22 | End: 2017-05-25

## 2017-05-22 RX ORDER — AZTREONAM 2 G
1 VIAL (EA) INJECTION
Qty: 10 | Refills: 0
Start: 2017-05-22 | End: 2017-05-27

## 2017-05-22 RX ORDER — METRONIDAZOLE 500 MG
1 TABLET ORAL
Qty: 36 | Refills: 0
Start: 2017-05-22 | End: 2017-06-03

## 2017-05-22 RX ORDER — METRONIDAZOLE 500 MG
1 TABLET ORAL
Qty: 36 | Refills: 0 | OUTPATIENT
Start: 2017-05-22 | End: 2017-06-03

## 2017-05-22 RX ADMIN — Medication 1 TABLET(S): at 05:44

## 2017-05-22 RX ADMIN — CEFTRIAXONE 100 GRAM(S): 500 INJECTION, POWDER, FOR SOLUTION INTRAMUSCULAR; INTRAVENOUS at 05:45

## 2017-05-22 RX ADMIN — Medication 100 MILLIGRAM(S): at 05:44

## 2017-05-22 NOTE — DISCHARGE NOTE ADULT - PATIENT PORTAL LINK FT
“You can access the [facility] Patient Portal, offered by [facility], by registering with the following website: [email or web address]”

## 2017-05-22 NOTE — ED ADULT NURSE REASSESSMENT NOTE - NS ED NURSE REASSESS COMMENT FT1
Pt alert and oriented, no apparent distress noted at this time. Pt handed off to RN AD in stable condition.
Pt care assumed at 1930, no apparent distress noted at this time, charting as noted. Pt received Alert and Oriented to person, place, and time sitting up in bed watching tv. Pt denies any complaints at this time. Plan of care explained to pt. pt is Normal Sinus Rhythm on cardiac monitor. will continue to monitor.
Pt is resting in bed comfortably at this time, no apparent distress noted at this time. Pt denies any complaints at this time. Constant observation maintained. Pt remains Normal Sinus Rhythm on cardiac monitor. Plan of care explained, will continue to monitor.
Streaks of bright red blood in gelatinous brown stool. Dr. forbes made aware. Pt cleaned and changed into a new diaper.  Able to bear weight on both legs with assistance.
assumed pt care this am, pt resting in bed, on contact isolation for cdiff. daughter with pt. chart and meds reviewed, paging Dr Callahan for leg wound care orders. pt is being treated for venous stasis ulcer
pt refused wound care until Dr Henry surgery approves, I explained that Dr. Williams Callahan ordered the wound care for the legs at pt request. Pt knows it is Sunday and Dr Henry not in office. Will await daughter to arrive and explain. Pt was also cleaned and changed by CNA at this time.
Pt resting on stretcher, repositioned for comfort, refreshments provided as requested by pt. POC discussed awaiting bed assignment.  Safety and comfort measures in place.
Report recvd from Mary Ann  Trop 0.13 MD made aware.
Report recvdf rom off going RN, pt recvd lying on stretcher, pt denies pain, family at bedside. Pt repositioned for comfort, MD at bedside, POC discussed pt to be admitted, pt and family verbalized understanding.
pt sitting up in cart awake and alert but slightly confused to place but was quickly reorientated. pt incont of large amount of stool care provided repositioned. cream applied to skin which remains red from frequent watery stools. pt informed about isolation precautions.
pt received awake and alert states that she has 2/10 pain. some bruises present to the left shoulder. pt iv in the left forearm dc'd non functioning. iv restarted in the right wrist fluids infusing well no redness or swelling present. pt informed of plan of care.
Pt. received at 1930, sleeping but awakens to voice, A&Ox3, VSS. returned to pt. at 2000 to find IV removed by pt., new IV placed, pt. cleaned and informed of plan of care, VSS, no distress noted, contact precautions maintained.

## 2017-05-22 NOTE — DISCHARGE NOTE ADULT - MEDICATION SUMMARY - MEDICATIONS TO STOP TAKING
I will STOP taking the medications listed below when I get home from the hospital:    Bacid (LAC) - oral tablet  -- 1 tab(s) by mouth 2 times a day

## 2017-05-22 NOTE — PROVIDER CONTACT NOTE (MEDICATION) - SITUATION
Discussed d/c with patient. Pt states that she doesn't want to leave until after 4 or 5 pm. Discussed d/c with patient. Pt states that she doesn't want to leave until after 4 or 5 pm. Doesn't want to take asp, folic acid or flagyl.

## 2017-05-22 NOTE — PROVIDER CONTACT NOTE (MEDICATION) - REASON
Pt states that she cannot leave for d/c until after 4pm Pt states that she cannot leave for d/c until after 4pm, refusing medications

## 2017-05-22 NOTE — DISCHARGE NOTE ADULT - PLAN OF CARE
stable, no fractures daughter does not want PT or any aides  patient is fierecly independent secondary to demand ischemia and not a MI take asa dc with 12 days of flagyl home probiotivs dc with po levaquin resovled tredning down and no signs of renal failure dc with po bactrim DS not a surgical candidate given age chronic diastolic hf low salt diet  home lasix

## 2017-05-22 NOTE — DISCHARGE NOTE ADULT - HOSPITAL COURSE
This is a 89 year old female who slipped and fell while walking in her bathroom hitting her back, no LOC, she currently denies any complaints other than difficulty with ambulation. While in ER patient was found to have elevated CPK, troponin and UTI.  Patient admitted to medicine and found to have rhadbo with elevated cpk. Patient also had mild nstemi with trop of 0.13 but that downtrended to 0.09. Patients cpk improved with fluids.     Patient also had c dif sent and was positive and started on po flagyl. Patient also started on iv abx for uti. Patient improved and will get a tte and if cleared by pt will go home with po abx. Spoke to patients daughter about need for home pt or cornelia and refused; stating her mother is independent and does not want any help at home. Patient will be discharged in good health to follow up with her pcp and vascular for le edema. Patient of note refused for the staff to address her legs.     time spent on dc 35 minutes This is a 89 year old female who slipped and fell while walking in her bathroom hitting her back, no LOC, she currently denies any complaints other than difficulty with ambulation. While in ER patient was found to have elevated CPK, troponin and UTI.  Patient admitted to medicine and found to have rhadbo with elevated cpk. Patient also had mild nstemi with trop of 0.13 but that downtrended to 0.09. Patients cpk improved with fluids.     Patient also had c dif sent and was positive and started on po flagyl. Patient also started on iv abx for uti. Patient improved and will get a tte and if cleared by pt will go home with po abx. Spoke to patients daughter about need for home pt or cornelia and refused; stating her mother is independent and does not want any help at home. Patient will be discharged in good health to follow up with her pcp and vascular for le edema. Patient of note refused for the staff to address her legs.     tte: done preserved ef  diastolic hf and moderate AS    time spent on dc 35 minutes

## 2017-05-22 NOTE — DISCHARGE NOTE ADULT - SECONDARY DIAGNOSIS.
NSTEMI (non-ST elevated myocardial infarction) Clostridium difficile colitis UTI (urinary tract infection) Rhabdomyolysis Moderate aortic stenosis Diastolic heart failure

## 2017-05-22 NOTE — DISCHARGE NOTE ADULT - CARE PLAN
Principal Discharge DX:	Fall  Goal:	stable, no fractures  Instructions for follow-up, activity and diet:	daughter does not want PT or any aides  patient is fierecly independent  Secondary Diagnosis:	NSTEMI (non-ST elevated myocardial infarction)  Goal:	secondary to demand ischemia and not a MI  Instructions for follow-up, activity and diet:	take asa  Secondary Diagnosis:	Clostridium difficile colitis  Goal:	dc with 12 days of flagyl  Instructions for follow-up, activity and diet:	home probiotivs  Secondary Diagnosis:	UTI (urinary tract infection)  Goal:	dc with po levaquin  Secondary Diagnosis:	Rhabdomyolysis  Goal:	resovled  Instructions for follow-up, activity and diet:	tredning down and no signs of renal failure Principal Discharge DX:	Fall  Goal:	stable, no fractures  Instructions for follow-up, activity and diet:	daughter does not want PT or any aides  patient is fierecly independent  Secondary Diagnosis:	NSTEMI (non-ST elevated myocardial infarction)  Goal:	secondary to demand ischemia and not a MI  Instructions for follow-up, activity and diet:	take asa  Secondary Diagnosis:	Clostridium difficile colitis  Goal:	dc with 12 days of flagyl  Instructions for follow-up, activity and diet:	home probiotivs  Secondary Diagnosis:	UTI (urinary tract infection)  Goal:	dc with po bactrim DS  Secondary Diagnosis:	Rhabdomyolysis  Goal:	resovled  Instructions for follow-up, activity and diet:	tredning down and no signs of renal failure Principal Discharge DX:	Fall  Goal:	stable, no fractures  Instructions for follow-up, activity and diet:	daughter does not want PT or any aides  patient is fierecly independent  Secondary Diagnosis:	NSTEMI (non-ST elevated myocardial infarction)  Goal:	secondary to demand ischemia and not a MI  Instructions for follow-up, activity and diet:	take asa  Secondary Diagnosis:	Clostridium difficile colitis  Goal:	dc with 12 days of flagyl  Instructions for follow-up, activity and diet:	home probiotivs  Secondary Diagnosis:	UTI (urinary tract infection)  Goal:	dc with po bactrim DS  Secondary Diagnosis:	Rhabdomyolysis  Goal:	resovled  Instructions for follow-up, activity and diet:	tredning down and no signs of renal failure  Secondary Diagnosis:	Moderate aortic stenosis  Goal:	not a surgical candidate given age  Secondary Diagnosis:	Diastolic heart failure  Goal:	chronic diastolic hf  Instructions for follow-up, activity and diet:	low salt diet  home lasix

## 2017-05-22 NOTE — DISCHARGE NOTE ADULT - MEDICATION SUMMARY - MEDICATIONS TO TAKE
I will START or STAY ON the medications listed below when I get home from the hospital:    Flagyl 500 mg oral tablet  -- 1 tab(s) by mouth every 8 hours  -- Do not drink alcoholic beverages when taking this medication.  Finish all this medication unless otherwise directed by prescriber.  May discolor urine or feces.    -- Indication: For c dif    aspirin 81 mg oral tablet, chewable  -- 1 tab(s) by mouth once a day  -- Indication: For heart health    Lasix  -- 20 milligram(s) by mouth once a day  -- Indication: For swelling    Iron 100 Plus  -- 1 tab(s) by mouth once a day  -- Indication: For anemia    Levaquin 500 mg oral tablet  -- 1 tab(s) by mouth every 24 hours  -- Avoid prolonged or excessive exposure to direct and/or artificial sunlight while taking this medication.  Do not take dairy products, antacids, or iron preparations within one hour of this medication.  Finish all this medication unless otherwise directed by prescriber.  May cause drowsiness or dizziness.  Medication should be taken with plenty of water.    -- Indication: For uti    folic acid  -- 1 milligram(s) by mouth once a day  -- Indication: For vitamins I will START or STAY ON the medications listed below when I get home from the hospital:    Flagyl 500 mg oral tablet  -- 1 tab(s) by mouth every 8 hours  -- Do not drink alcoholic beverages when taking this medication.  Finish all this medication unless otherwise directed by prescriber.  May discolor urine or feces.    -- Indication: For c dif    aspirin 81 mg oral tablet, chewable  -- 1 tab(s) by mouth once a day  -- Indication: For heart health    Lasix  -- 20 milligram(s) by mouth once a day  -- Indication: For swelling    Iron 100 Plus  -- 1 tab(s) by mouth once a day  -- Indication: For anemia    Bactrim  mg-160 mg oral tablet  -- 1 tab(s) by mouth 2 times a day  -- Avoid prolonged or excessive exposure to direct and/or artificial sunlight while taking this medication.  Finish all this medication unless otherwise directed by prescriber.  Medication should be taken with plenty of water.    -- Indication: For MRSA uti     folic acid  -- 1 milligram(s) by mouth once a day  -- Indication: For vitamins

## 2017-05-23 LAB
CULTURE RESULTS: SIGNIFICANT CHANGE UP
SPECIMEN SOURCE: SIGNIFICANT CHANGE UP

## 2017-05-24 LAB
CULTURE RESULTS: SIGNIFICANT CHANGE UP
CULTURE RESULTS: SIGNIFICANT CHANGE UP
SPECIMEN SOURCE: SIGNIFICANT CHANGE UP
SPECIMEN SOURCE: SIGNIFICANT CHANGE UP

## 2017-08-28 ENCOUNTER — RX RENEWAL (OUTPATIENT)
Age: 82
End: 2017-08-28

## 2017-10-11 PROCEDURE — 87040 BLOOD CULTURE FOR BACTERIA: CPT

## 2017-10-11 PROCEDURE — 83605 ASSAY OF LACTIC ACID: CPT

## 2017-10-11 PROCEDURE — 87045 FECES CULTURE AEROBIC BACT: CPT

## 2017-10-11 PROCEDURE — 80053 COMPREHEN METABOLIC PANEL: CPT

## 2017-10-11 PROCEDURE — 84484 ASSAY OF TROPONIN QUANT: CPT

## 2017-10-11 PROCEDURE — 83735 ASSAY OF MAGNESIUM: CPT

## 2017-10-11 PROCEDURE — 87186 SC STD MICRODIL/AGAR DIL: CPT

## 2017-10-11 PROCEDURE — 93306 TTE W/DOPPLER COMPLETE: CPT

## 2017-10-11 PROCEDURE — 81001 URINALYSIS AUTO W/SCOPE: CPT

## 2017-10-11 PROCEDURE — 82553 CREATINE MB FRACTION: CPT

## 2017-10-11 PROCEDURE — 73110 X-RAY EXAM OF WRIST: CPT

## 2017-10-11 PROCEDURE — 36415 COLL VENOUS BLD VENIPUNCTURE: CPT

## 2017-10-11 PROCEDURE — 87046 STOOL CULTR AEROBIC BACT EA: CPT

## 2017-10-11 PROCEDURE — 70450 CT HEAD/BRAIN W/O DYE: CPT

## 2017-10-11 PROCEDURE — 71045 X-RAY EXAM CHEST 1 VIEW: CPT

## 2017-10-11 PROCEDURE — 87086 URINE CULTURE/COLONY COUNT: CPT

## 2017-10-11 PROCEDURE — 85027 COMPLETE CBC AUTOMATED: CPT

## 2017-10-11 PROCEDURE — 93005 ELECTROCARDIOGRAM TRACING: CPT

## 2017-10-11 PROCEDURE — 99291 CRITICAL CARE FIRST HOUR: CPT | Mod: 25

## 2017-10-11 PROCEDURE — 87493 C DIFF AMPLIFIED PROBE: CPT

## 2017-10-11 PROCEDURE — 73090 X-RAY EXAM OF FOREARM: CPT

## 2017-10-11 PROCEDURE — 80048 BASIC METABOLIC PNL TOTAL CA: CPT

## 2017-10-11 PROCEDURE — 82550 ASSAY OF CK (CPK): CPT

## 2017-10-11 PROCEDURE — 72125 CT NECK SPINE W/O DYE: CPT

## 2018-09-29 ENCOUNTER — EMERGENCY (EMERGENCY)
Facility: HOSPITAL | Age: 83
LOS: 1 days | Discharge: HOME | End: 2018-09-29
Attending: EMERGENCY MEDICINE
Payer: MEDICARE

## 2018-09-29 VITALS
OXYGEN SATURATION: 98 % | TEMPERATURE: 98 F | RESPIRATION RATE: 18 BRPM | HEIGHT: 63 IN | HEART RATE: 82 BPM | SYSTOLIC BLOOD PRESSURE: 164 MMHG | WEIGHT: 149.91 LBS | DIASTOLIC BLOOD PRESSURE: 70 MMHG

## 2018-09-29 PROCEDURE — 99284 EMERGENCY DEPT VISIT MOD MDM: CPT

## 2018-09-29 PROCEDURE — 70450 CT HEAD/BRAIN W/O DYE: CPT | Mod: 26

## 2018-09-29 PROCEDURE — 99284 EMERGENCY DEPT VISIT MOD MDM: CPT | Mod: 25

## 2018-09-29 PROCEDURE — 70450 CT HEAD/BRAIN W/O DYE: CPT

## 2018-09-29 NOTE — ED ADULT NURSE REASSESSMENT NOTE - NS ED NURSE REASSESS COMMENT FT1
ice pack applied to right forehead; right forearm skin tear cleansed with normal saline dressed with Xeroform and guaze wrap; bleeding noted to be controlled

## 2018-09-29 NOTE — ED ADULT NURSE NOTE - NS ED NURSE DC PT EDUCATION RESOURCES
Ov Dr. Olivia Temple for new pt  ICD checked today by [employer]  Here today with [facility] Dtg in law/POA  No problems just switching doctors  Was seeing [facility]  Currently at [facility] since   Going well  pcp is  at [facility]  2x a week pt walks her down abdi  Hx of CHF  Hx of A fib  (+) on going edema  No chest pain   No dizziness or lightheadedness  Hx of dementia   Follow up in one year  Will do the wireless remote for ICD  Per jayden.            Dad  age 62 with MI  Mom  with enlarged heart  One living brother has a pacer  One  from cancer  2  sisters one from cancer   One from [address] children   Former smoker quit in   Non alcohol Yes

## 2018-09-29 NOTE — ED ADULT NURSE NOTE - OBJECTIVE STATEMENT
Patient states she fell while outside gardening and fell forward and hit her head and states she has a skin tear on her right forearm pt denies LOC, denies dizziness, denies nausea/vomiting ;pt reports taking ASA 81mg daily

## 2018-09-29 NOTE — ED PROVIDER NOTE - OBJECTIVE STATEMENT
This patient is a 89 year old woman who presents to the ER to be evaluated s/p fall.  Patient states that she fell while gardening.  She denies LOC and syncope.  Patient states that she is not on anti-coagulation medication.

## 2018-09-29 NOTE — ED ADULT NURSE REASSESSMENT NOTE - NS ED NURSE REASSESS COMMENT FT1
Assuming care from previous RN, pt AOx4, denies SOB, resp even and unlabored, skin warm and dry, color good, denies pain/n/v, no IV access in place, awaiting transportation back to home, pt has DC paperwork, aware of plan of care.

## 2018-09-29 NOTE — ED ADULT TRIAGE NOTE - CHIEF COMPLAINT QUOTE
trip and fall outside while gardening. a and o x3. ecchymosis and edema to rt side of forehead. no other external signs of trauma. -loc. denies blood thinners.

## 2018-09-29 NOTE — ED PROVIDER NOTE - CARE PLAN
Principal Discharge DX:	Scalp hematoma  Secondary Diagnosis:	Fall  Secondary Diagnosis:	Skin tear of left upper extremity

## 2018-09-29 NOTE — ED ADULT NURSE NOTE - NSIMPLEMENTINTERV_GEN_ALL_ED
Implemented All Fall with Harm Risk Interventions:  [place] to call system. Call bell, personal items and telephone within reach. Instruct patient to call for assistance. Room bathroom lighting operational. Non-slip footwear when patient is off stretcher. Physically safe environment: no spills, clutter or unnecessary equipment. Stretcher in lowest position, wheels locked, appropriate side rails in place. Provide visual cue, wrist band, yellow gown, etc. Monitor gait and stability. Monitor for mental status changes and reorient to person, place, and time. Review medications for side effects contributing to fall risk. Reinforce activity limits and safety measures with patient and family. Provide visual clues: red socks.

## 2018-09-29 NOTE — ED ADULT NURSE REASSESSMENT NOTE - NS ED NURSE REASSESS COMMENT FT1
Pt assisted to restroom w/ RN via wheelchair, pt ambulates steadily w/ assistance, pt placed into position of comfort upon return to stretcher, continues to await transport to be brought back home, pt offers no complaints at this time, beba crackers and warm blankets provided, will continue to monitor.

## 2018-09-30 VITALS
SYSTOLIC BLOOD PRESSURE: 155 MMHG | TEMPERATURE: 98 F | DIASTOLIC BLOOD PRESSURE: 68 MMHG | HEART RATE: 84 BPM | RESPIRATION RATE: 18 BRPM | OXYGEN SATURATION: 98 %

## 2018-09-30 NOTE — ED ADULT NURSE REASSESSMENT NOTE - NS ED NURSE REASSESS COMMENT FT1
[facility] care here to bring pt back to [facility], pt ambulated well to wheelchair, pt d/c home.

## 2018-10-02 ENCOUNTER — APPOINTMENT (OUTPATIENT)
Dept: FAMILY MEDICINE | Facility: CLINIC | Age: 83
End: 2018-10-02
Payer: MEDICARE

## 2018-10-02 VITALS
DIASTOLIC BLOOD PRESSURE: 70 MMHG | TEMPERATURE: 97.7 F | BODY MASS INDEX: 23.05 KG/M2 | WEIGHT: 135 LBS | HEIGHT: 64 IN | RESPIRATION RATE: 13 BRPM | OXYGEN SATURATION: 96 % | HEART RATE: 70 BPM | SYSTOLIC BLOOD PRESSURE: 122 MMHG

## 2018-10-02 DIAGNOSIS — Z23 ENCOUNTER FOR IMMUNIZATION: ICD-10-CM

## 2018-10-02 DIAGNOSIS — Z13.1 ENCOUNTER FOR SCREENING FOR DIABETES MELLITUS: ICD-10-CM

## 2018-10-02 DIAGNOSIS — Z79.899 OTHER LONG TERM (CURRENT) DRUG THERAPY: ICD-10-CM

## 2018-10-02 DIAGNOSIS — E53.8 DEFICIENCY OF OTHER SPECIFIED B GROUP VITAMINS: ICD-10-CM

## 2018-10-02 DIAGNOSIS — Z87.2 PERSONAL HISTORY OF DISEASES OF THE SKIN AND SUBCUTANEOUS TISSUE: ICD-10-CM

## 2018-10-02 DIAGNOSIS — M85.80 OTHER SPECIFIED DISORDERS OF BONE DENSITY AND STRUCTURE, UNSPECIFIED SITE: ICD-10-CM

## 2018-10-02 PROCEDURE — 99214 OFFICE O/P EST MOD 30 MIN: CPT | Mod: 25

## 2018-10-02 PROCEDURE — G0008: CPT | Mod: 59

## 2018-10-02 PROCEDURE — 36415 COLL VENOUS BLD VENIPUNCTURE: CPT

## 2018-10-02 PROCEDURE — 90662 IIV NO PRSV INCREASED AG IM: CPT

## 2018-10-02 NOTE — PHYSICAL EXAM
[No Acute Distress] : no acute distress [Well Nourished] : well nourished [Normal Sclera/Conjunctiva] : normal sclera/conjunctiva [PERRL] : pupils equal round and reactive to light [Normal Outer Ear/Nose] : the outer ears and nose were normal in appearance [Normal Oropharynx] : the oropharynx was normal [Supple] : supple [No Respiratory Distress] : no respiratory distress  [Clear to Auscultation] : lungs were clear to auscultation bilaterally [Normal Rate] : normal rate  [Regular Rhythm] : with a regular rhythm [Normal Anterior Cervical Nodes] : no anterior cervical lymphadenopathy [Grossly Normal Strength/Tone] : grossly normal strength/tone [Coordination Grossly Intact] : coordination grossly intact [Normal Affect] : the affect was normal [Normal Insight/Judgement] : insight and judgment were intact

## 2018-10-02 NOTE — COUNSELING
[Healthy eating counseling provided] : healthy eating [Behavioral health counseling provided] : behavioral health  [Low Fat Diet] : Low fat diet [Low Salt Diet] : Low salt diet [Engage in a relaxing activity] : Engage in a relaxing activity [None] : None

## 2018-10-02 NOTE — HISTORY OF PRESENT ILLNESS
[Family Member] : family member [FreeTextEntry8] : Patient presents S/P fall at home. She was gardening and lost her balance and fell on the cement walk. Has right sided bruising over left parietal/frontal area that extends to periorbital area . She was taken by ambulance to [facility]. Also, sustain loss of skin (tear) over right forearm.\par Diet is good and drinking water daily. \par Patient is here with daughter.

## 2018-10-03 LAB
25(OH)D3 SERPL-MCNC: 15.1 NG/ML
ALBUMIN SERPL ELPH-MCNC: 4.3 G/DL
ALP BLD-CCNC: 87 U/L
ALT SERPL-CCNC: 10 U/L
ANION GAP SERPL CALC-SCNC: 12 MMOL/L
AST SERPL-CCNC: 25 U/L
BASOPHILS # BLD AUTO: 0.04 K/UL
BASOPHILS NFR BLD AUTO: 1 %
BILIRUB SERPL-MCNC: 0.5 MG/DL
BUN SERPL-MCNC: 22 MG/DL
CALCIUM SERPL-MCNC: 9.5 MG/DL
CHLORIDE SERPL-SCNC: 102 MMOL/L
CHOLEST SERPL-MCNC: 215 MG/DL
CHOLEST/HDLC SERPL: 2.8 RATIO
CO2 SERPL-SCNC: 26 MMOL/L
CREAT SERPL-MCNC: 0.95 MG/DL
EOSINOPHIL # BLD AUTO: 0.16 K/UL
EOSINOPHIL NFR BLD AUTO: 4 %
FERRITIN SERPL-MCNC: 92 NG/ML
FOLATE SERPL-MCNC: >20 NG/ML
GLUCOSE SERPL-MCNC: 88 MG/DL
HBA1C MFR BLD HPLC: 5.6 %
HCT VFR BLD CALC: 44.5 %
HDLC SERPL-MCNC: 76 MG/DL
HGB BLD-MCNC: 13.4 G/DL
IMM GRANULOCYTES NFR BLD AUTO: 0.2 %
IRON SATN MFR SERPL: 18 %
IRON SERPL-MCNC: 51 UG/DL
LDLC SERPL CALC-MCNC: 118 MG/DL
LYMPHOCYTES # BLD AUTO: 1.45 K/UL
LYMPHOCYTES NFR BLD AUTO: 36.2 %
MAN DIFF?: NORMAL
MCHC RBC-ENTMCNC: 28.9 PG
MCHC RBC-ENTMCNC: 30.1 GM/DL
MCV RBC AUTO: 95.9 FL
MONOCYTES # BLD AUTO: 0.45 K/UL
MONOCYTES NFR BLD AUTO: 11.2 %
NEUTROPHILS # BLD AUTO: 1.9 K/UL
NEUTROPHILS NFR BLD AUTO: 47.4 %
PLATELET # BLD AUTO: 177 K/UL
POTASSIUM SERPL-SCNC: 5.5 MMOL/L
PROT SERPL-MCNC: 7.9 G/DL
RBC # BLD: 4.64 M/UL
RBC # FLD: 14.8 %
SODIUM SERPL-SCNC: 140 MMOL/L
TIBC SERPL-MCNC: 281 UG/DL
TRIGL SERPL-MCNC: 105 MG/DL
TSH SERPL-ACNC: 3.7 UIU/ML
UIBC SERPL-MCNC: 230 UG/DL
VIT B12 SERPL-MCNC: 303 PG/ML
WBC # FLD AUTO: 4.01 K/UL

## 2018-10-10 ENCOUNTER — APPOINTMENT (OUTPATIENT)
Dept: FAMILY MEDICINE | Facility: CLINIC | Age: 83
End: 2018-10-10
Payer: MEDICARE

## 2018-10-10 VITALS
HEIGHT: 64 IN | SYSTOLIC BLOOD PRESSURE: 118 MMHG | BODY MASS INDEX: 23.05 KG/M2 | DIASTOLIC BLOOD PRESSURE: 78 MMHG | HEART RATE: 58 BPM | OXYGEN SATURATION: 96 % | WEIGHT: 135 LBS | TEMPERATURE: 97.7 F | RESPIRATION RATE: 13 BRPM

## 2018-10-10 DIAGNOSIS — I25.10 ATHEROSCLEROTIC HEART DISEASE OF NATIVE CORONARY ARTERY W/OUT ANGINA PECTORIS: ICD-10-CM

## 2018-10-10 DIAGNOSIS — R79.89 OTHER SPECIFIED ABNORMAL FINDINGS OF BLOOD CHEMISTRY: ICD-10-CM

## 2018-10-10 DIAGNOSIS — D50.9 IRON DEFICIENCY ANEMIA, UNSPECIFIED: ICD-10-CM

## 2018-10-10 DIAGNOSIS — S00.93XA CONTUSION OF UNSPECIFIED PART OF HEAD, INITIAL ENCOUNTER: ICD-10-CM

## 2018-10-10 DIAGNOSIS — E78.5 HYPERLIPIDEMIA, UNSPECIFIED: ICD-10-CM

## 2018-10-10 DIAGNOSIS — I10 ESSENTIAL (PRIMARY) HYPERTENSION: ICD-10-CM

## 2018-10-10 PROCEDURE — 36415 COLL VENOUS BLD VENIPUNCTURE: CPT

## 2018-10-10 PROCEDURE — 99214 OFFICE O/P EST MOD 30 MIN: CPT | Mod: 25

## 2018-10-10 RX ORDER — UBIDECARENONE/VIT E ACET 100MG-5
25 MCG CAPSULE ORAL DAILY
Qty: 90 | Refills: 0 | Status: ACTIVE | COMMUNITY
Start: 2018-10-10 | End: 1900-01-01

## 2018-10-10 NOTE — HISTORY OF PRESENT ILLNESS
[Family Member] : family member [de-identified] : Patient presents for follow up of health care. Stated she felt well. Facial bruising healing well Diet is fair. Needs to drink more water.\par Compliant with medication. Daughter stated patient sees Vascular M.D. Right arm skin tear healing. Had wound checked by Vascular M.D.

## 2018-10-10 NOTE — PLAN
[FreeTextEntry1] : Advised to continue medications as directed. Patient will follow up with Vascular M.[place].\par Redressed wound.

## 2018-10-10 NOTE — PHYSICAL EXAM
[No Acute Distress] : no acute distress [Well Nourished] : well nourished [Normal Sclera/Conjunctiva] : normal sclera/conjunctiva [PERRL] : pupils equal round and reactive to light [Normal Outer Ear/Nose] : the outer ears and nose were normal in appearance [Supple] : supple [No Respiratory Distress] : no respiratory distress  [Clear to Auscultation] : lungs were clear to auscultation bilaterally [Normal Rate] : normal rate  [Normal Anterior Cervical Nodes] : no anterior cervical lymphadenopathy [Coordination Grossly Intact] : coordination grossly intact [Normal Affect] : the affect was normal [Normal Insight/Judgement] : insight and judgment were intact [de-identified] : Laceration - no dicharge

## 2018-10-12 LAB
ALBUMIN SERPL ELPH-MCNC: 4.3 G/DL
ALP BLD-CCNC: 87 U/L
ALT SERPL-CCNC: 10 U/L
ANION GAP SERPL CALC-SCNC: 13 MMOL/L
AST SERPL-CCNC: 23 U/L
BILIRUB SERPL-MCNC: 0.4 MG/DL
BUN SERPL-MCNC: 16 MG/DL
CALCIUM SERPL-MCNC: 9.1 MG/DL
CHLORIDE SERPL-SCNC: 101 MMOL/L
CO2 SERPL-SCNC: 28 MMOL/L
CREAT SERPL-MCNC: 0.87 MG/DL
GLUCOSE SERPL-MCNC: 84 MG/DL
POTASSIUM SERPL-SCNC: 4.8 MMOL/L
PROT SERPL-MCNC: 7.7 G/DL
SODIUM SERPL-SCNC: 142 MMOL/L

## 2019-02-28 NOTE — ED ADULT NURSE NOTE - NS ED NURSE DC INFO COMPLEXITY
Patient informed/Family informed/Explanation of wait/TV
Patient asked questions/Verbalized Understanding/Simple: Patient demonstrates quick and easy understanding

## 2019-11-04 ENCOUNTER — EMERGENCY (EMERGENCY)
Facility: HOSPITAL | Age: 84
LOS: 1 days | Discharge: HOME | End: 2019-11-04
Attending: EMERGENCY MEDICINE
Payer: MEDICARE

## 2019-11-04 VITALS
TEMPERATURE: 98 F | DIASTOLIC BLOOD PRESSURE: 81 MMHG | SYSTOLIC BLOOD PRESSURE: 186 MMHG | HEART RATE: 76 BPM | RESPIRATION RATE: 16 BRPM | OXYGEN SATURATION: 96 %

## 2019-11-04 VITALS
OXYGEN SATURATION: 96 % | SYSTOLIC BLOOD PRESSURE: 201 MMHG | TEMPERATURE: 98 F | WEIGHT: 100.09 LBS | HEIGHT: 62 IN | RESPIRATION RATE: 18 BRPM | DIASTOLIC BLOOD PRESSURE: 87 MMHG | HEART RATE: 78 BPM

## 2019-11-04 PROCEDURE — 70450 CT HEAD/BRAIN W/O DYE: CPT

## 2019-11-04 PROCEDURE — 12001 RPR S/N/AX/GEN/TRNK 2.5CM/<: CPT

## 2019-11-04 PROCEDURE — 12001 RPR S/N/AX/GEN/TRNK 2.5CM/<: CPT | Mod: F9

## 2019-11-04 PROCEDURE — 72125 CT NECK SPINE W/O DYE: CPT | Mod: 26

## 2019-11-04 PROCEDURE — 70450 CT HEAD/BRAIN W/O DYE: CPT | Mod: 26

## 2019-11-04 PROCEDURE — 99284 EMERGENCY DEPT VISIT MOD MDM: CPT | Mod: 25,GC

## 2019-11-04 PROCEDURE — 72125 CT NECK SPINE W/O DYE: CPT

## 2019-11-04 PROCEDURE — 99284 EMERGENCY DEPT VISIT MOD MDM: CPT | Mod: 25

## 2019-11-04 RX ORDER — HYDRALAZINE HCL 50 MG
25 TABLET ORAL ONCE
Refills: 0 | Status: COMPLETED | OUTPATIENT
Start: 2019-11-04 | End: 2019-11-04

## 2019-11-04 RX ORDER — ACETAMINOPHEN 500 MG
650 TABLET ORAL ONCE
Refills: 0 | Status: COMPLETED | OUTPATIENT
Start: 2019-11-04 | End: 2019-11-04

## 2019-11-04 RX ORDER — AMLODIPINE BESYLATE 2.5 MG/1
1 TABLET ORAL
Qty: 30 | Refills: 0
Start: 2019-11-04 | End: 2019-12-03

## 2019-11-04 RX ADMIN — Medication 650 MILLIGRAM(S): at 11:19

## 2019-11-04 RX ADMIN — Medication 25 MILLIGRAM(S): at 15:48

## 2019-11-04 NOTE — ED PROVIDER NOTE - ATTENDING CONTRIBUTION TO CARE
89 year old woman fall from standing no LOC.  Patient awake, alert and oriented x 3.  She denies chest pain, SOB, and headache.  She states that she did not hit her head.  She [place] when she lost her bearings and fell.  She does report some neck discomfort with midline tenderness on exam.  CT negative for ICH and cervical spine fracture.  Patient sustained a laceration to the right 5th digit and was repaired.

## 2019-11-04 NOTE — ED PROVIDER NOTE - PHYSICAL EXAMINATION
General: Awake, alert and oriented. In no acute distress.   HEENT: Normocephalic, atraumatic. Moist mucous membranes.   Eyes: No scleral icterus. EOMI. No orbital deformity.  Neck:. Soft and supple. Tender to palpation of paraspinal muscles at C5/6. No midline tenderness. ROM wnl.  Cardiac: Regular rate and regular rhythm. +S1/S2. No murmurs, rubs, gallops.   Resp: Lungs CTAB. Speaking in full sentences. No evidence of respiratory distress.   Abd: Soft, non-tender, non-distended. No guarding or rebound.   Back: Spine midline and non-tender.   Skin: 2cm laceration to distal 5th digit of R hand.   Neuro: Awake, alert & oriented x 3. Moves all extremities symmetrically. Motor strength 5/5. Sensation grossly intact.

## 2019-11-04 NOTE — ED PROVIDER NOTE - CLINICAL SUMMARY MEDICAL DECISION MAKING FREE TEXT BOX
Patient reporting mechanical fall with laceration to finger. Given patient's age, will CT head and Cspine for acute pathology. Finger laceration to be repaired with stitches. NO need for labwork at this time. Patient reporting mechanical fall with laceration to finger. Given patient's age, will CT head and Cspine for acute pathology. Finger laceration to be repaired with stitches. NO need for labwork at this time. Patient BP improved with medical management. Imaging normal.

## 2019-11-04 NOTE — ED PROVIDER NOTE - NS ED ROS FT
General: Denies fever, chills  HEENT: Denies sensory changes, sore throat  Neck: Endorses neck pain. Denies neck stiffness  Resp: Denies coughing, SOB  Cardiovascular: Denies CP, palpitations, LE edema  GI: Denies nausea, vomiting, abdominal pain, diarrhea  : Denies dysuria, hematuria, frequency, incontinence  MSK: Denies back pain. Endorses finger pain  Neuro: Denies syncope, HA, dizziness, numbness, weakness  Skin: Denies rashes.

## 2019-11-04 NOTE — ED PROVIDER NOTE - PATIENT PORTAL LINK FT
You can access the FollowMyHealth Patient Portal offered by [facility] by registering at the following website: [email or web address]. By joining [facility]’s FollowMyHealth portal, you will also be able to view your health information using other applications (apps) compatible with our system.

## 2019-11-04 NOTE — ED ADULT NURSE NOTE - OBJECTIVE STATEMENT
Pt sustained slip and fall on wet grass causing lac to R 2nd digit from wooden fence when she fell. Pt also states she has some mild pain to posterior C spine s/p fall. Pt is able to maex4 with equal strength and purpose. Bleeding is controlled to lac on R 2nd digit. Pt acting appropriately and denies LOC.

## 2019-11-04 NOTE — ED ADULT NURSE NOTE - CHIEF COMPLAINT QUOTE
BIBA s/p slip and fall outside while checking her oil levels. C/O right hand pain with dried blood present. Unsure if hit her head. Denies blood thinners and denies daily meds. States she only takes vitamins and doesn't have a PMD. Hypertensive upon arrival. GCS 15. [place]. alert and oriented x4. Denies CP, SOB, or numbness or tingling to peripheral extremities. Denies dizziness, fainting, or syncopal episode.

## 2019-11-04 NOTE — ED PROVIDER NOTE - OBJECTIVE STATEMENT
Pt is a 89yo F with history of CHF presenting after a fall. Patient states she was checking the oil in her trailer when she slipped on wet grass and fell. Denies any LOC, pre-syncopal symptoms, or changes to sensorium. Patient reports that as she fell her hand reached for the fence and she cut her 5th digit. She denies hitting her head and states she fell on her back. She reports that she had trouble standing up and her neighbor called 911 to bring her to the hospital. Endorses some neck pain. Denies CP, HA, SOB, palpitations, blurry/double vision.

## 2019-11-04 NOTE — ED ADULT TRIAGE NOTE - CHIEF COMPLAINT QUOTE
BIBA s/p slip and fall outside while checking her oil levels. C/O right hand pain with dried blood present. Unsure if hit her head. Denies blood thinners and denies daily meds. States she only takes vitamins and doesn't have a PMD. Hypertensive upon arrival. GCS 15. [place]. alert and oriented x4. Denies CP, SOB, or numbness or tingling to peripheral extremities. Denies dizziness, fainting, or syncopal episode.

## 2020-01-22 ENCOUNTER — EMERGENCY (EMERGENCY)
Facility: HOSPITAL | Age: 85
LOS: 1 days | Discharge: HOME | End: 2020-01-22
Attending: STUDENT IN AN ORGANIZED HEALTH CARE EDUCATION/TRAINING PROGRAM
Payer: MEDICARE

## 2020-01-22 VITALS
RESPIRATION RATE: 18 BRPM | OXYGEN SATURATION: 94 % | DIASTOLIC BLOOD PRESSURE: 59 MMHG | HEART RATE: 78 BPM | SYSTOLIC BLOOD PRESSURE: 133 MMHG

## 2020-01-22 VITALS
RESPIRATION RATE: 20 BRPM | HEIGHT: 64 IN | HEART RATE: 78 BPM | DIASTOLIC BLOOD PRESSURE: 82 MMHG | SYSTOLIC BLOOD PRESSURE: 197 MMHG | OXYGEN SATURATION: 96 % | TEMPERATURE: 98 F | WEIGHT: 139.99 LBS

## 2020-01-22 LAB
ALBUMIN SERPL ELPH-MCNC: 3.9 G/DL — SIGNIFICANT CHANGE UP (ref 3.3–5.2)
ALP SERPL-CCNC: 98 U/L — SIGNIFICANT CHANGE UP (ref 40–120)
ALT FLD-CCNC: 15 U/L — SIGNIFICANT CHANGE UP
ANION GAP SERPL CALC-SCNC: 14 MMOL/L — SIGNIFICANT CHANGE UP (ref 5–17)
APPEARANCE UR: CLEAR — SIGNIFICANT CHANGE UP
AST SERPL-CCNC: 45 U/L — HIGH
BACTERIA # UR AUTO: ABNORMAL
BASOPHILS # BLD AUTO: 0.03 K/UL — SIGNIFICANT CHANGE UP (ref 0–0.2)
BASOPHILS NFR BLD AUTO: 0.4 % — SIGNIFICANT CHANGE UP (ref 0–2)
BILIRUB SERPL-MCNC: 0.4 MG/DL — SIGNIFICANT CHANGE UP (ref 0.4–2)
BILIRUB UR-MCNC: NEGATIVE — SIGNIFICANT CHANGE UP
BUN SERPL-MCNC: 24 MG/DL — HIGH (ref 8–20)
CALCIUM SERPL-MCNC: 9.3 MG/DL — SIGNIFICANT CHANGE UP (ref 8.6–10.2)
CHLORIDE SERPL-SCNC: 102 MMOL/L — SIGNIFICANT CHANGE UP (ref 98–107)
CK MB CFR SERPL CALC: 20.4 NG/ML — HIGH (ref 0–6.7)
CK SERPL-CCNC: 970 U/L — HIGH (ref 25–170)
CO2 SERPL-SCNC: 23 MMOL/L — SIGNIFICANT CHANGE UP (ref 22–29)
COLOR SPEC: YELLOW — SIGNIFICANT CHANGE UP
COMMENT - URINE: SIGNIFICANT CHANGE UP
CREAT SERPL-MCNC: 0.86 MG/DL — SIGNIFICANT CHANGE UP (ref 0.5–1.3)
DIFF PNL FLD: ABNORMAL
EOSINOPHIL # BLD AUTO: 0.01 K/UL — SIGNIFICANT CHANGE UP (ref 0–0.5)
EOSINOPHIL NFR BLD AUTO: 0.1 % — SIGNIFICANT CHANGE UP (ref 0–6)
EPI CELLS # UR: SIGNIFICANT CHANGE UP
GLUCOSE SERPL-MCNC: 154 MG/DL — HIGH (ref 70–99)
GLUCOSE UR QL: NEGATIVE MG/DL — SIGNIFICANT CHANGE UP
GRAN CASTS # UR COMP ASSIST: ABNORMAL /LPF
HCT VFR BLD CALC: 46.4 % — HIGH (ref 34.5–45)
HGB BLD-MCNC: 14.5 G/DL — SIGNIFICANT CHANGE UP (ref 11.5–15.5)
IMM GRANULOCYTES NFR BLD AUTO: 0.3 % — SIGNIFICANT CHANGE UP (ref 0–1.5)
KETONES UR-MCNC: ABNORMAL
LACTATE BLDV-MCNC: 1.4 MMOL/L — SIGNIFICANT CHANGE UP (ref 0.5–2)
LEUKOCYTE ESTERASE UR-ACNC: ABNORMAL
LYMPHOCYTES # BLD AUTO: 0.83 K/UL — LOW (ref 1–3.3)
LYMPHOCYTES # BLD AUTO: 12 % — LOW (ref 13–44)
MCHC RBC-ENTMCNC: 29.1 PG — SIGNIFICANT CHANGE UP (ref 27–34)
MCHC RBC-ENTMCNC: 31.3 GM/DL — LOW (ref 32–36)
MCV RBC AUTO: 93 FL — SIGNIFICANT CHANGE UP (ref 80–100)
MONOCYTES # BLD AUTO: 0.69 K/UL — SIGNIFICANT CHANGE UP (ref 0–0.9)
MONOCYTES NFR BLD AUTO: 10 % — SIGNIFICANT CHANGE UP (ref 2–14)
NEUTROPHILS # BLD AUTO: 5.34 K/UL — SIGNIFICANT CHANGE UP (ref 1.8–7.4)
NEUTROPHILS NFR BLD AUTO: 77.2 % — HIGH (ref 43–77)
NITRITE UR-MCNC: NEGATIVE — SIGNIFICANT CHANGE UP
PH UR: 6 — SIGNIFICANT CHANGE UP (ref 5–8)
PLATELET # BLD AUTO: 181 K/UL — SIGNIFICANT CHANGE UP (ref 150–400)
POTASSIUM SERPL-MCNC: 4.5 MMOL/L — SIGNIFICANT CHANGE UP (ref 3.5–5.3)
POTASSIUM SERPL-SCNC: 4.5 MMOL/L — SIGNIFICANT CHANGE UP (ref 3.5–5.3)
PROT SERPL-MCNC: 7.9 G/DL — SIGNIFICANT CHANGE UP (ref 6.6–8.7)
PROT UR-MCNC: 500 MG/DL
RBC # BLD: 4.99 M/UL — SIGNIFICANT CHANGE UP (ref 3.8–5.2)
RBC # FLD: 13.4 % — SIGNIFICANT CHANGE UP (ref 10.3–14.5)
RBC CASTS # UR COMP ASSIST: ABNORMAL /HPF (ref 0–4)
SODIUM SERPL-SCNC: 139 MMOL/L — SIGNIFICANT CHANGE UP (ref 135–145)
SP GR SPEC: 1.02 — SIGNIFICANT CHANGE UP (ref 1.01–1.02)
TROPONIN T SERPL-MCNC: 0.06 NG/ML — SIGNIFICANT CHANGE UP (ref 0–0.06)
UROBILINOGEN FLD QL: NEGATIVE MG/DL — SIGNIFICANT CHANGE UP
WBC # BLD: 6.92 K/UL — SIGNIFICANT CHANGE UP (ref 3.8–10.5)
WBC # FLD AUTO: 6.92 K/UL — SIGNIFICANT CHANGE UP (ref 3.8–10.5)
WBC UR QL: SIGNIFICANT CHANGE UP

## 2020-01-22 PROCEDURE — 82550 ASSAY OF CK (CPK): CPT

## 2020-01-22 PROCEDURE — 72110 X-RAY EXAM L-2 SPINE 4/>VWS: CPT | Mod: 26

## 2020-01-22 PROCEDURE — 85027 COMPLETE CBC AUTOMATED: CPT

## 2020-01-22 PROCEDURE — 99284 EMERGENCY DEPT VISIT MOD MDM: CPT | Mod: 25

## 2020-01-22 PROCEDURE — 80053 COMPREHEN METABOLIC PANEL: CPT

## 2020-01-22 PROCEDURE — 81001 URINALYSIS AUTO W/SCOPE: CPT

## 2020-01-22 PROCEDURE — 72125 CT NECK SPINE W/O DYE: CPT

## 2020-01-22 PROCEDURE — 36415 COLL VENOUS BLD VENIPUNCTURE: CPT

## 2020-01-22 PROCEDURE — 83605 ASSAY OF LACTIC ACID: CPT

## 2020-01-22 PROCEDURE — 87086 URINE CULTURE/COLONY COUNT: CPT

## 2020-01-22 PROCEDURE — 72125 CT NECK SPINE W/O DYE: CPT | Mod: 26

## 2020-01-22 PROCEDURE — 72070 X-RAY EXAM THORAC SPINE 2VWS: CPT | Mod: 26

## 2020-01-22 PROCEDURE — 72110 X-RAY EXAM L-2 SPINE 4/>VWS: CPT

## 2020-01-22 PROCEDURE — 84484 ASSAY OF TROPONIN QUANT: CPT

## 2020-01-22 PROCEDURE — 70450 CT HEAD/BRAIN W/O DYE: CPT

## 2020-01-22 PROCEDURE — 72070 X-RAY EXAM THORAC SPINE 2VWS: CPT

## 2020-01-22 PROCEDURE — 82553 CREATINE MB FRACTION: CPT

## 2020-01-22 PROCEDURE — 70450 CT HEAD/BRAIN W/O DYE: CPT | Mod: 26

## 2020-01-22 PROCEDURE — 73502 X-RAY EXAM HIP UNI 2-3 VIEWS: CPT | Mod: 26,LT

## 2020-01-22 PROCEDURE — 99284 EMERGENCY DEPT VISIT MOD MDM: CPT

## 2020-01-22 PROCEDURE — 73502 X-RAY EXAM HIP UNI 2-3 VIEWS: CPT

## 2020-01-22 RX ORDER — ACETAMINOPHEN 500 MG
975 TABLET ORAL ONCE
Refills: 0 | Status: COMPLETED | OUTPATIENT
Start: 2020-01-22 | End: 2020-01-22

## 2020-01-22 RX ADMIN — Medication 975 MILLIGRAM(S): at 08:41

## 2020-01-22 NOTE — ED ADULT NURSE REASSESSMENT NOTE - NS ED NURSE REASSESS COMMENT FT1
Pt sitting upright in stretcher in no apparent signs of distress. Pt status unchanged, pt discharged but awaiting daughter for . Unable to come until 0600. Dr Florian aware, pt to be moved off cardiac monitor and placed in hallway spot. Will continue to monitor.

## 2020-01-22 NOTE — ED PROVIDER NOTE - PHYSICAL EXAMINATION
Gen: Alert, NAD  Head: NC, AT, PERRL, EOMI, normal lids/conjunctiva  ENT: normal hearing, patent oropharynx without erythema/exudate, uvula midline  Neck: +supple, no tenderness/meningismus/JVD, +Trachea midline  Pulm: Bilateral BS, normal resp effort, no wheeze/stridor/retractions  CV: RRR, no M/R/G, +dist pulses  Abd: soft, NT/ND, +BS, no hepatosplenomegaly  Mskel: LLE mildly shortened withpain limited ROM but NV intact, T5-L2 mild TTP, no edema/erythema/cyanosis  Skin: no rash, erythemaotus area across abd that daughter states is normal for her  Neuro: AAOx3, no gross sensory/motor deficits,

## 2020-01-22 NOTE — PROVIDER CONTACT NOTE (OTHER) - ACTION/TREATMENT ORDERED:
Goals (to be obtained by d/c):  Bed mobility: supervision  Transfers: supervision  Gait: supervision Goals (to be obtained by d/c):  Bed mobility: supervision  Transfers: supervision w/ RW  Gait: supervision w/ RW x100 ft

## 2020-01-22 NOTE — ED ADULT NURSE NOTE - NSIMPLEMENTINTERV_GEN_ALL_ED
Implemented All Fall with Harm Risk Interventions:  [place] to call system. Call bell, personal items and telephone within reach. Instruct patient to call for assistance. Room bathroom lighting operational. Non-slip footwear when patient is off stretcher. Physically safe environment: no spills, clutter or unnecessary equipment. Stretcher in lowest position, wheels locked, appropriate side rails in place. Provide visual cue, wrist band, yellow gown, etc. Monitor gait and stability. Monitor for mental status changes and reorient to person, place, and time. Review medications for side effects contributing to fall risk. Reinforce activity limits and safety measures with patient and family. Provide visual clues: red socks.

## 2020-01-22 NOTE — ED ADULT NURSE REASSESSMENT NOTE - NS ED NURSE REASSESS COMMENT FT1
Patient A&Ox4 complaining of pain to left elbow, stated is tolerable. Denies any chest pain, shortness of breath, nausea or dizziness. Respirations even & unlabored, denies any numbness or tingling. Saline lock in place, patent, negative s/s phlebitis or infiltration. Family at bedside. Plan of care discussed, all questions answered. Will continue to monitor.

## 2020-01-22 NOTE — ED ADULT NURSE NOTE - OBJECTIVE STATEMENT
Assumed care at 0800 pt in no apparent distress co pain to left hip after a fall last night, pt states she went tot use the bathrooms using her walker and slipped and fell, pt denies LOC, dizziness asper family they found her on the floor this morning with a pillow under her head. deformity to left leg no devin

## 2020-01-22 NOTE — PHYSICAL THERAPY INITIAL EVALUATION ADULT - GENERAL OBSERVATIONS, REHAB EVAL
Patient received lying in stretcher, NAD, breathing RA, +monitor, +primafit. Pt agreeable to Physical Therapy evaluation.

## 2020-01-22 NOTE — ED PROVIDER NOTE - PATIENT PORTAL LINK FT
You can access the FollowMyHealth Patient Portal offered by Health system by registering at the following website: [email or web address]. By joining [facility]’s FollowMyHealth portal, you will also be able to view your health information using other applications (apps) compatible with our system.

## 2020-01-22 NOTE — PHYSICAL THERAPY INITIAL EVALUATION ADULT - ADDITIONAL COMMENTS
Patient lives in a trailer with 4 DYLAN with bilateral railing. She lives alone, was independent prior to admission using a RW for ambulation. She reports owning a commode that is placed over the toilet, a shower chair, and a tripod cane

## 2020-01-22 NOTE — ED ADULT NURSE REASSESSMENT NOTE - NS ED NURSE REASSESS COMMENT FT1
Assumed pt care @ 1930. Pt sitting upright in stretcher in no apparent signs of distress. Pt remains on cardiac monitor, PIV site WNL. Pt status unchanged, refer to flowsheet and chart, pt ID band in place, pt safety maintained, pt hemodynamically stable, updated on plan of care. Awaiting dispo. Call light provided, fall safety reinforced. Will continue to monitor

## 2020-01-22 NOTE — ED PROVIDER NOTE - CLINICAL SUMMARY MEDICAL DECISION MAKING FREE TEXT BOX
Patient prensets with fall.  Lab values do not require emergent intervention. Xrays demonstrate no acute pathology.  CT scan demonstrates no acute pathology. PT evaluation and good for home with therapy.  d/w daughter Arianna at  and she will arrange with JUNE.  Daughter will come pick her up at 0600 on 1/23.  Uneventful ED observation period. Non toxic.  Well appearing. Discussed results and outcome of testing with the patient.  Patient advised to please follow up with their primary care doctor within the next 24 hours and return to the Emergency Department for worsening symptoms or any other concerns.  Patient advised that their doctor may call  to follow up on the specific results of the tests performed today in the emergency department.

## 2020-01-22 NOTE — ED ADULT TRIAGE NOTE - CHIEF COMPLAINT QUOTE
BIBA c/o single mechanical slip and fall ~1900 yesterday, pt remained on floor until dtr came to visit ~0600 called EMS, pt reporting left hip pain, no thinners no obvious deformity or injuries noted, pt presented in C-Collar reporting neck pain, MD Farrell at bedside [facility] called at this time, pt [place], pt med hx goes to vascular [id number] for venous insufficiency, denies daily medication use

## 2020-01-22 NOTE — PHYSICAL THERAPY INITIAL EVALUATION ADULT - DISCHARGE DISPOSITION, PT EVAL
home w/ assist/Due to patient's age, she may not benefit from skilled PT, rather, having an assist at home would be more beneficial

## 2020-01-22 NOTE — ED PROVIDER NOTE - OBJECTIVE STATEMENT
Pertinent PMH/PSH/FHx/SHx and Review of Systems contained within:  Patient presents to the ED for being found on floor for unknonw amount of time.  family bedside found her at home on the floor and called EMS.  VSS.  Patient cannot recall events.  Per patient and family, no PMH.  no meds.  lives alone.  unwitnessed fall.  Patient with CT head/cspine at initial evlauatiotn which are negative for acute pathology.  However, on further assessment, patient with left hip pain and back pain.  no other concerns.  Non toxic.  Well appearing. No aggravating or relieving factors. No other pertinent PMH.  No other pertinent PSH.  No other pertinent FHx.  Patient denies EtOH/tobacco/illicit substance use. No fever/chills, No photophobia/eye pain/changes in vision, No ear pain/sore throat/dysphagia, No chest pain/palpitations, no SOB/cough/wheeze/stridor, No abdominal pain, No N/V/D, no dysuria/frequency/discharge, No neck pain, no rash, no changes in neurological status/function.

## 2020-01-22 NOTE — PROVIDER CONTACT NOTE (OTHER) - ASSESSMENT
Chart reviewed, MD orders received. Per RN Sharee, pt clear for mobilization. Pt received lying in stretcher, NAD, +monitor, +primafit, Pt A&O x4 and agreeable to PT evaluation. Please see full evaluation note for detail. Pt c/o 0/10 pain during session. Pt left lying in stretcher, NAD, all lines/devices intact and CBWR. RN aware. PT will follow.

## 2020-01-22 NOTE — ED ADULT NURSE REASSESSMENT NOTE - NS ED NURSE REASSESS COMMENT FT1
Patient to have PT evaluation. Family made aware. Daughter Arianna [phone]. Patient & family agrees to plan of care. Will continue to monitor.

## 2020-01-22 NOTE — ED ADULT NURSE NOTE - CHIEF COMPLAINT QUOTE
BIBA c/o single mechanical slip and fall ~1900 yesterday, pt remained on floor until dtr came to visit ~0600 called EMS, pt reporting left hip pain, no thinners no obvious deformity or injuries noted, pt presented in C-Collar reporting neck pain, MD Farrell at bedside [facility] called at this time, pt [place], pt med hx goes to vascular [id number] for venous insufficiency, denies daily medication use

## 2020-01-22 NOTE — ED ADULT NURSE REASSESSMENT NOTE - NS ED NURSE REASSESS COMMENT FT1
Assumed patient care at 1140, report received from previous RN, charting as noted. Patient asleep, negative obvious pain, discomfort or distress, respirations even/unlabored. Easy to arouse. Extremely hard of hearing. Denies any pain, stated feels "tired". Saline lock in place, patent, negative s/s phlebitis or infiltration. Family at bedside. Plan of care discussed, all questions answered. Will monitor.

## 2020-01-23 LAB
CULTURE RESULTS: SIGNIFICANT CHANGE UP
SPECIMEN SOURCE: SIGNIFICANT CHANGE UP

## 2020-04-03 ENCOUNTER — INPATIENT (INPATIENT)
Facility: HOSPITAL | Age: 85
LOS: 1 days | Discharge: NURSING, REHAB OR LONG-TERM CARE | DRG: 557 | End: 2020-04-05
Attending: INTERNAL MEDICINE | Admitting: INTERNAL MEDICINE
Payer: MEDICARE

## 2020-04-03 VITALS
OXYGEN SATURATION: 95 % | DIASTOLIC BLOOD PRESSURE: 80 MMHG | HEART RATE: 90 BPM | HEIGHT: 61 IN | SYSTOLIC BLOOD PRESSURE: 178 MMHG | TEMPERATURE: 98 F | WEIGHT: 125 LBS | RESPIRATION RATE: 16 BRPM

## 2020-04-03 DIAGNOSIS — I21.4 NON-ST ELEVATION (NSTEMI) MYOCARDIAL INFARCTION: ICD-10-CM

## 2020-04-03 LAB
ALBUMIN SERPL ELPH-MCNC: 3.9 G/DL — SIGNIFICANT CHANGE UP (ref 3.3–5.2)
ALP SERPL-CCNC: 90 U/L — SIGNIFICANT CHANGE UP (ref 40–120)
ALT FLD-CCNC: 18 U/L — SIGNIFICANT CHANGE UP
ANION GAP SERPL CALC-SCNC: 13 MMOL/L — SIGNIFICANT CHANGE UP (ref 5–17)
AST SERPL-CCNC: 54 U/L — HIGH
BASOPHILS # BLD AUTO: 0.07 K/UL — SIGNIFICANT CHANGE UP (ref 0–0.2)
BASOPHILS NFR BLD AUTO: 0.9 % — SIGNIFICANT CHANGE UP (ref 0–2)
BILIRUB SERPL-MCNC: 0.4 MG/DL — SIGNIFICANT CHANGE UP (ref 0.4–2)
BUN SERPL-MCNC: 25 MG/DL — HIGH (ref 8–20)
CALCIUM SERPL-MCNC: 9.4 MG/DL — SIGNIFICANT CHANGE UP (ref 8.6–10.2)
CHLORIDE SERPL-SCNC: 101 MMOL/L — SIGNIFICANT CHANGE UP (ref 98–107)
CK SERPL-CCNC: 1780 U/L — HIGH (ref 25–170)
CO2 SERPL-SCNC: 24 MMOL/L — SIGNIFICANT CHANGE UP (ref 22–29)
CREAT SERPL-MCNC: 0.95 MG/DL — SIGNIFICANT CHANGE UP (ref 0.5–1.3)
EOSINOPHIL # BLD AUTO: 0 K/UL — SIGNIFICANT CHANGE UP (ref 0–0.5)
EOSINOPHIL NFR BLD AUTO: 0 % — SIGNIFICANT CHANGE UP (ref 0–6)
GLUCOSE SERPL-MCNC: 132 MG/DL — HIGH (ref 70–99)
HCT VFR BLD CALC: 45.3 % — HIGH (ref 34.5–45)
HGB BLD-MCNC: 14 G/DL — SIGNIFICANT CHANGE UP (ref 11.5–15.5)
LYMPHOCYTES # BLD AUTO: 1.1 K/UL — SIGNIFICANT CHANGE UP (ref 1–3.3)
LYMPHOCYTES # BLD AUTO: 14.9 % — SIGNIFICANT CHANGE UP (ref 13–44)
MAGNESIUM SERPL-MCNC: 2 MG/DL — SIGNIFICANT CHANGE UP (ref 1.6–2.6)
MCHC RBC-ENTMCNC: 28.2 PG — SIGNIFICANT CHANGE UP (ref 27–34)
MCHC RBC-ENTMCNC: 30.9 GM/DL — LOW (ref 32–36)
MCV RBC AUTO: 91.1 FL — SIGNIFICANT CHANGE UP (ref 80–100)
MONOCYTES # BLD AUTO: 0.19 K/UL — SIGNIFICANT CHANGE UP (ref 0–0.9)
MONOCYTES NFR BLD AUTO: 2.6 % — SIGNIFICANT CHANGE UP (ref 2–14)
NEUTROPHILS # BLD AUTO: 6.01 K/UL — SIGNIFICANT CHANGE UP (ref 1.8–7.4)
NEUTROPHILS NFR BLD AUTO: 81.6 % — HIGH (ref 43–77)
NT-PROBNP SERPL-SCNC: 3994 PG/ML — HIGH (ref 0–300)
PHOSPHATE SERPL-MCNC: 2.8 MG/DL — SIGNIFICANT CHANGE UP (ref 2.4–4.7)
PLATELET # BLD AUTO: 157 K/UL — SIGNIFICANT CHANGE UP (ref 150–400)
POTASSIUM SERPL-MCNC: 4.4 MMOL/L — SIGNIFICANT CHANGE UP (ref 3.5–5.3)
POTASSIUM SERPL-SCNC: 4.4 MMOL/L — SIGNIFICANT CHANGE UP (ref 3.5–5.3)
PROT SERPL-MCNC: 7.7 G/DL — SIGNIFICANT CHANGE UP (ref 6.6–8.7)
RBC # BLD: 4.97 M/UL — SIGNIFICANT CHANGE UP (ref 3.8–5.2)
RBC # FLD: 14.3 % — SIGNIFICANT CHANGE UP (ref 10.3–14.5)
SODIUM SERPL-SCNC: 138 MMOL/L — SIGNIFICANT CHANGE UP (ref 135–145)
TROPONIN T SERPL-MCNC: 0.22 NG/ML — HIGH (ref 0–0.06)
WBC # BLD: 7.37 K/UL — SIGNIFICANT CHANGE UP (ref 3.8–10.5)
WBC # FLD AUTO: 7.37 K/UL — SIGNIFICANT CHANGE UP (ref 3.8–10.5)

## 2020-04-03 PROCEDURE — 73080 X-RAY EXAM OF ELBOW: CPT | Mod: 26,LT

## 2020-04-03 PROCEDURE — 99223 1ST HOSP IP/OBS HIGH 75: CPT

## 2020-04-03 PROCEDURE — 71045 X-RAY EXAM CHEST 1 VIEW: CPT | Mod: 26

## 2020-04-03 PROCEDURE — 71260 CT THORAX DX C+: CPT | Mod: 26

## 2020-04-03 PROCEDURE — 99284 EMERGENCY DEPT VISIT MOD MDM: CPT | Mod: GC

## 2020-04-03 PROCEDURE — 73562 X-RAY EXAM OF KNEE 3: CPT | Mod: 26,RT

## 2020-04-03 PROCEDURE — 93010 ELECTROCARDIOGRAM REPORT: CPT

## 2020-04-03 PROCEDURE — 70450 CT HEAD/BRAIN W/O DYE: CPT | Mod: 26

## 2020-04-03 PROCEDURE — 73620 X-RAY EXAM OF FOOT: CPT | Mod: 26,RT

## 2020-04-03 PROCEDURE — 74177 CT ABD & PELVIS W/CONTRAST: CPT | Mod: 26

## 2020-04-03 PROCEDURE — 72170 X-RAY EXAM OF PELVIS: CPT | Mod: 26

## 2020-04-03 PROCEDURE — 72131 CT LUMBAR SPINE W/O DYE: CPT | Mod: 26

## 2020-04-03 PROCEDURE — 70486 CT MAXILLOFACIAL W/O DYE: CPT | Mod: 26

## 2020-04-03 PROCEDURE — 72128 CT CHEST SPINE W/O DYE: CPT | Mod: 26

## 2020-04-03 PROCEDURE — 72125 CT NECK SPINE W/O DYE: CPT | Mod: 26

## 2020-04-03 RX ORDER — SODIUM CHLORIDE 9 MG/ML
500 INJECTION INTRAMUSCULAR; INTRAVENOUS; SUBCUTANEOUS ONCE
Refills: 0 | Status: COMPLETED | OUTPATIENT
Start: 2020-04-03 | End: 2020-04-03

## 2020-04-03 RX ORDER — LIDOCAINE 4 G/100G
1 CREAM TOPICAL ONCE
Refills: 0 | Status: COMPLETED | OUTPATIENT
Start: 2020-04-03 | End: 2020-04-03

## 2020-04-03 RX ORDER — ASPIRIN/CALCIUM CARB/MAGNESIUM 324 MG
324 TABLET ORAL DAILY
Refills: 0 | Status: DISCONTINUED | OUTPATIENT
Start: 2020-04-03 | End: 2020-04-04

## 2020-04-03 NOTE — ED PROVIDER NOTE - PROGRESS NOTE DETAILS
trop elevated, pro bnp elevated. at xray now. no ich on ct, no concern for dissection given no chest pain/no abd pain, and low concern for intraabd bleed given no abd pain, vss. therefore will give full dose asa for elevated trop.. will give lasix depending on chest xray attempted to call daughter againt o update her regarding CT scans and admission. left a message. patient gave permission to speak with daughter as daughter is proxy. wounds cleansed.

## 2020-04-03 NOTE — ED PROVIDER NOTE - TOBACCO USE
[facility] AND [facility] in [facility] [address]  [address] [facility]    Phone:  [phone]    Fax:  [phone]   MRN: [id number]    Department:  [facility] AND [facility] in [place]   Date of Vi instructed with your primary care doctor, please return to immediate care. Takes Zofran every 4-6 hours as needed.         Discharge References/Attachments     INFLUENZA  (ENGLISH)      Disclosure     Insurance plans vary and the physician(s) referred by Registration line at [phone] or find a doctor online by visiting [email or web address].    IF THERE IS ANY CHANGE OR WORSENING OF YOUR CONDITION, CALL YOUR PRIMARY CARE PHYSICIAN AT ONCE OR GO TO [address] [place] [address].     If you have been prescribed a - If you have concerns related to behavioral health issues or thoughts of harming yourself, contact [facility] at [phone].     - If you don’t have insurance, Lucita Valentine has partnered with Patient [facility] Unknown if ever smoked

## 2020-04-03 NOTE — H&P ADULT - ASSESSMENT
89 y/o female with PMH of HTN was brought to the ED for fall. Patient said she was walking to check her garbage and fell on her back. She was too weak to get up so was just lying on the floor until found by her neighbor who call 911.  She did not have LOC, as per patient she remembered everything. Patient was seen in the ED in 01/2020 for similar complaint. Attempt made to call patient's daughter Kaitlynn ([phone]) no answer. Note from prior reviewed, family stated at the time that patient was not on any medication and she live alone. She was seen by PT, rec: home with assist.     Rhabdomyolysis 2/2 fall  Admit to monitor bed   CK: 1780   Patient pan scan, no acute finding   Gently hydration   Trend CK   Fall precaution   PT evaluation in AM     Elevated troponin   Troponin: 0.20   No ischemic change on the ECG   Patient has no acute complaint   Likely due to rhabdo   Trend cardiac enzymes     HTN   As per ED note, daughter said patient is not on any medication   Monitor BP, will give hydralazine PRN for SBP > 150    Supportive DVT   DVT prophylaxis: heparin sc   Diet: DASH     Please call family in AM to discuss goal of care. 89 y/o female with PMH of HTN was brought to the ED for fall. Patient said she was walking to check her garbage and fell on her back. She was too weak to get up so was just lying on the floor until found by her neighbor who call 911.  She did not have LOC, as per patient she remembered everything. Patient was seen in the ED in 01/2020 for similar complaint. Attempt made to call patient's daughter Kaitlynn ([phone]) no answer. Note from prior reviewed, family stated at the time that patient was not on any medication and she live alone. She was seen by PT, rec: home with assist. She was pan scan in the ED, no acute fracture. CXR: Patchy bilateral interstitial lung opacities for which developing pneumonia is considered. COVID-19 pneumonia cannot be excluded.    Rhabdomyolysis 2/2 fall  Admit to monitor bed   CK: 1780   Patient pan scan, no acute finding   Gently hydration   Trend CK   Fall precaution   PT evaluation in AM     ? Pneumonia   CXR as mentioned above, concerning for CoVID-19   Patient is saturating well on RA now, no fever and no complaint   Will monitor patient for now     Elevated troponin   Troponin: 0.20   No ischemic change on the ECG   Patient has no acute complaint   Aspirin 325m once given in the ED   Likely due to rhabdo   Trend cardiac enzymes     HTN   As per ED note, daughter said patient is not on any medication   Monitor BP, will give hydralazine PRN for SBP > 150    Supportive DVT   DVT prophylaxis: heparin sc   Diet: DASH     Please call family in AM to discuss goal of care.

## 2020-04-03 NOTE — ED ADULT TRIAGE NOTE - CHIEF COMPLAINT QUOTE
Patient A&Ox4, extremely [place], complaining of pain to left elbow. Patient comes from home s/p fall at 0830 this morning as per patient. EMS reports patient found on floor in prone position. Patient stated she felt dizzy & fell. Presents with swelling to left eye, redness to side of left face, & abrasion to chin. EMS reports patient lives alone, has neighbor that checks on her daily, became concerned that she did not hear from patient all day. Dr. Hall at bedside for eval, priority CT.

## 2020-04-03 NOTE — ED PROVIDER NOTE - PHYSICAL EXAMINATION
PHYSICAL EXAM:   General:  in no acute distress  HEENT: PERRLA, airway patent, L sided facial edema and mild erythema, abrasion to L chin  Cardiovascular: regular rate and rhythm  Respiratory: mildly tachypneic, audible wheezes without stethoscope. diffuse coarse expiratory lung sounds and scattered wheezes  Abdominal: soft, nontender, +distended  Back: +L scapular TTP, no cervical or thoracic spinal TTP, lumbosacral tenderness to palpation (patient states this is NOT from the fall, describes it as arthritis)  Extremities: +LE varicose veins wrapped b/l. R great toe TTP with ecchymosis. Abrasion and erythema to R knee. Small laceration approx 1 cm, not bleeding, to webspace between thumb and index finger. TTP over L elbow but able to range. No other gross extremity pain or deformities  Neuro: awake and interactive. moving all extremities. No drift noted in upper or lower extremities.   Psychiatric: appropriate mood and affect.   Skin: as mentioned, stage 1 sacral gavin Castillo PGY-2 PHYSICAL EXAM:   General:  in no acute distress  HEENT: PERRLA, airway patent, L sided facial edema and mild erythema, abrasion to L chin  Cardiovascular: regular rate and rhythm  Respiratory: mildly tachypneic, audible wheezes without stethoscope. diffuse coarse expiratory lung sounds and scattered wheezes. L chest wall TTP in midaxillary line without ecchymosis  Abdominal: soft, nontender, +distended  Back: +L scapular TTP, no cervical or thoracic spinal TTP, lumbosacral tenderness to palpation (patient states this is NOT from the fall, describes it as arthritis)  Extremities: +LE varicose veins wrapped b/l. R great toe TTP with ecchymosis. Abrasion and erythema to R knee. Small laceration approx 1 cm, not bleeding, to webspace between thumb and index finger. TTP over L elbow but able to range. No other gross extremity pain or deformities  Neuro: awake and interactive. moving all extremities. No drift noted in upper or lower extremities.   Psychiatric: appropriate mood and affect.   Skin: as mentioned, stage 1 sacral decub,  scattered patches of scaled, hyperpigmentation/erythema  -Janina Castillo PGY-2

## 2020-04-03 NOTE — ED PROVIDER NOTE - OBJECTIVE STATEMENT
89 yo F with PMH anemia on Fe tablets, per chart review also history of CHF (which daughter states she does NOT have) and CAD on ASA presents with fall. Patient states she was walking to check her garbage and fell onto her back. States she remembers "just about" everything. Was too weak to lift her head off the ground or get up from the ground after. Remained on the ground until her neighbor checked in on her and EMS called. Patient states before she fell, she kept feeling as though she would "fall backward". Felt fine yesterday. Does not use 02 at home. No cough. Patient is very hard of hearing    Spoke with daughter [phone] for collateral, Spoke with Kaitlynn (daughter) who states that patient does not have any medical problems, no known CHF. Facial swelling is new

## 2020-04-03 NOTE — ED PROVIDER NOTE - CLINICAL SUMMARY MEDICAL DECISION MAKING FREE TEXT BOX
unwitnessed fall with injuries as mentioned. Was called as a priority CT, upon further evaluation noted to need additional imaging. Found to be hypoxic to 90% on RA and with coarse lung sounds and wheezing. No fever. No cough. Therefore, testing for covid not indicated at this point. Improvement to 100% Sp02 with 4 L NC, tapered to 3 L. Will get labs to eval for CK, an cardiac cause of hypoxia (CHF with probnp, also getting trop to eval for cardiac injury). Will check electrolytes. EKG shows NSR, QTc 435. Will get CT/Xray. Lidoderm patch for back pain. Additional pain medication as indicated. Dispo pending.

## 2020-04-03 NOTE — ED PROVIDER NOTE - CARE PLAN
Principal Discharge DX:	NSTEMI (non-ST elevated myocardial infarction)  Secondary Diagnosis:	Fall  Secondary Diagnosis:	Elevated CK  Secondary Diagnosis:	Hypoxia

## 2020-04-03 NOTE — ED PROVIDER NOTE - NS ED ROS FT
REVIEW OF SYSTEMS:  MSK: L sided chest pain, +L elbow pain  Respiratory: no cough  Gastrointestinal: no abdominal pain,   Extremities: see above  -Janina Castillo PGY-2

## 2020-04-03 NOTE — ED PROVIDER NOTE - ATTENDING CONTRIBUTION TO CARE
89 year old woman s/p fall no LOC.  She does report dizziness prior falling.  Patient with paraspinal lumbosacral tenderness on exam well appearing GCS15; abrasion to right knee and superficial laceration to 1st webspace right hand.  Patient noted to be hypoxic with wheezing on exam no fever no cough no reports of recent illness.  She was found to be in mild rhabdo and NSTEMI  Patient admitted.

## 2020-04-03 NOTE — H&P ADULT - NSICDXPASTMEDICALHX_GEN_ALL_CORE_FT
PAST MEDICAL HISTORY:  CHF (congestive heart failure)     Coronary artery disease involving native coronary artery of native heart without angina pectoris

## 2020-04-03 NOTE — ED ADULT TRIAGE NOTE - NS ED NURSE AMBULANCES
Critical lab. Lab called at [phone] notifying nurse that the pt Ca was 6.4, this is an improvement of 5.8 at last draw 0410. MD called and message left for call back.  MD notified (Ayaan Larry) [facility]

## 2020-04-03 NOTE — H&P ADULT - HISTORY OF PRESENT ILLNESS
89 y/o female with PMH of HTN was brought to the ED for fall. Patient said she was walking to check her garbage and fell on her back. She was too weak to get up so was just lying on the floor until found by her neighbor who call 911.  She did no have LOC, as per patient she remembered everything. Patient was seen in the ED in 01/2020 for similar complaint. She has no chest pain, shortness of breath, nausea, vomiting, 89 y/o female with PMH of HTN was brought to the ED for fall. Patient said she was walking to check her garbage and fell on her back. She was too weak to get up so was just lying on the floor until found by her neighbor who call 911.  She did not have LOC, as per patient she remembered everything. Patient was seen in the ED in 01/2020 for similar complaint. She has no chest pain, shortness of breath, nausea, vomiting, abdominal pain, change in bowel/urinary habit, syncope, HA, head trauma.   Attempt made to call patient's daughter Kaitlynn ([phone]) no answer. Note from prior reviewed, family stated at the time that patient was not on any medication and she live alone. She was seen by PT, rec: home with assist.

## 2020-04-04 ENCOUNTER — TRANSCRIPTION ENCOUNTER (OUTPATIENT)
Age: 85
End: 2020-04-04

## 2020-04-04 LAB
CK MB CFR SERPL CALC: 28.1 NG/ML — HIGH (ref 0–6.7)
CK SERPL-CCNC: 1178 U/L — HIGH (ref 25–170)
CRP SERPL-MCNC: 7.25 MG/DL — HIGH (ref 0–0.4)
FERRITIN SERPL-MCNC: 97 NG/ML — SIGNIFICANT CHANGE UP (ref 15–150)
LDH SERPL L TO P-CCNC: 288 U/L — HIGH (ref 98–192)
PROCALCITONIN SERPL-MCNC: 0.2 NG/ML — HIGH (ref 0.02–0.1)
SARS-COV-2 RNA SPEC QL NAA+PROBE: SIGNIFICANT CHANGE UP
TROPONIN T SERPL-MCNC: 0.23 NG/ML — HIGH (ref 0–0.06)

## 2020-04-04 PROCEDURE — 99497 ADVNCD CARE PLAN 30 MIN: CPT | Mod: GC

## 2020-04-04 PROCEDURE — 99233 SBSQ HOSP IP/OBS HIGH 50: CPT | Mod: GC

## 2020-04-04 PROCEDURE — 93306 TTE W/DOPPLER COMPLETE: CPT | Mod: 26

## 2020-04-04 RX ORDER — ALBUTEROL 90 UG/1
2 AEROSOL, METERED ORAL EVERY 6 HOURS
Refills: 0 | Status: DISCONTINUED | OUTPATIENT
Start: 2020-04-04 | End: 2020-04-05

## 2020-04-04 RX ORDER — AZITHROMYCIN 500 MG/1
500 TABLET, FILM COATED ORAL DAILY
Refills: 0 | Status: DISCONTINUED | OUTPATIENT
Start: 2020-04-04 | End: 2020-04-05

## 2020-04-04 RX ORDER — SODIUM CHLORIDE 9 MG/ML
1000 INJECTION INTRAMUSCULAR; INTRAVENOUS; SUBCUTANEOUS
Refills: 0 | Status: DISCONTINUED | OUTPATIENT
Start: 2020-04-04 | End: 2020-04-05

## 2020-04-04 RX ORDER — CEFTRIAXONE 500 MG/1
1000 INJECTION, POWDER, FOR SOLUTION INTRAMUSCULAR; INTRAVENOUS EVERY 24 HOURS
Refills: 0 | Status: DISCONTINUED | OUTPATIENT
Start: 2020-04-04 | End: 2020-04-05

## 2020-04-04 RX ORDER — ASCORBIC ACID 60 MG
500 TABLET,CHEWABLE ORAL THREE TIMES A DAY
Refills: 0 | Status: DISCONTINUED | OUTPATIENT
Start: 2020-04-04 | End: 2020-04-05

## 2020-04-04 RX ORDER — ACETAMINOPHEN 500 MG
650 TABLET ORAL EVERY 6 HOURS
Refills: 0 | Status: DISCONTINUED | OUTPATIENT
Start: 2020-04-04 | End: 2020-04-05

## 2020-04-04 RX ORDER — SACCHAROMYCES BOULARDII 250 MG
250 POWDER IN PACKET (EA) ORAL
Refills: 0 | Status: DISCONTINUED | OUTPATIENT
Start: 2020-04-04 | End: 2020-04-05

## 2020-04-04 RX ORDER — ASPIRIN/CALCIUM CARB/MAGNESIUM 324 MG
81 TABLET ORAL DAILY
Refills: 0 | Status: DISCONTINUED | OUTPATIENT
Start: 2020-04-05 | End: 2020-04-05

## 2020-04-04 RX ORDER — HEPARIN SODIUM 5000 [USP'U]/ML
5000 INJECTION INTRAVENOUS; SUBCUTANEOUS EVERY 8 HOURS
Refills: 0 | Status: DISCONTINUED | OUTPATIENT
Start: 2020-04-04 | End: 2020-04-05

## 2020-04-04 RX ORDER — THIAMINE MONONITRATE (VIT B1) 100 MG
200 TABLET ORAL
Refills: 0 | Status: DISCONTINUED | OUTPATIENT
Start: 2020-04-04 | End: 2020-04-05

## 2020-04-04 RX ADMIN — Medication 200 MILLIGRAM(S): at 18:39

## 2020-04-04 RX ADMIN — ALBUTEROL 2 PUFF(S): 90 AEROSOL, METERED ORAL at 18:39

## 2020-04-04 RX ADMIN — CEFTRIAXONE 100 MILLIGRAM(S): 500 INJECTION, POWDER, FOR SOLUTION INTRAMUSCULAR; INTRAVENOUS at 17:45

## 2020-04-04 RX ADMIN — AZITHROMYCIN 500 MILLIGRAM(S): 500 TABLET, FILM COATED ORAL at 17:44

## 2020-04-04 RX ADMIN — Medication 500 MILLIGRAM(S): at 21:39

## 2020-04-04 RX ADMIN — HEPARIN SODIUM 5000 UNIT(S): 5000 INJECTION INTRAVENOUS; SUBCUTANEOUS at 21:39

## 2020-04-04 RX ADMIN — Medication 324 MILLIGRAM(S): at 00:09

## 2020-04-04 RX ADMIN — Medication 324 MILLIGRAM(S): at 13:36

## 2020-04-04 RX ADMIN — SODIUM CHLORIDE 60 MILLILITER(S): 9 INJECTION INTRAMUSCULAR; INTRAVENOUS; SUBCUTANEOUS at 15:27

## 2020-04-04 RX ADMIN — Medication 500 MILLIGRAM(S): at 15:27

## 2020-04-04 RX ADMIN — HEPARIN SODIUM 5000 UNIT(S): 5000 INJECTION INTRAVENOUS; SUBCUTANEOUS at 13:36

## 2020-04-04 RX ADMIN — Medication 1 TABLET(S): at 13:36

## 2020-04-04 RX ADMIN — Medication 250 MILLIGRAM(S): at 18:39

## 2020-04-04 RX ADMIN — SODIUM CHLORIDE 500 MILLILITER(S): 9 INJECTION INTRAMUSCULAR; INTRAVENOUS; SUBCUTANEOUS at 00:08

## 2020-04-04 NOTE — ED ADULT NURSE NOTE - CHIEF COMPLAINT QUOTE
Patient A&Ox4, extremely [place], complaining of pain to left elbow. Patient comes from home s/p fall at 0830 this morning as per patient. EMS reports patient found on floor in prone position. Patient stated she felt dizzy & fell. Presents with swelling to left eye, redness to side of left face, & abrasion to chin. EMS reports patient lives alone, has neighbor that checks on her daily, became concerned that she did not hear from patient all day. Dr. Hall at bedside for eval, priority CT.

## 2020-04-04 NOTE — DISCHARGE NOTE PROVIDER - HOSPITAL COURSE
89 y/o female with PMH of HTN was brought to the ED for fall. Patient admitted after a mechanical fall  She was too weak to get up so was just lying on the floor until found by her neighbor who call 911.  She did not have LOC, as per patient she remembered everything. Patient was seen in the ED in 01/2020 for similar complaint. On this admission CT scan of head negative for acute CVA , spine CT w/ chronic compression fractures of T2-3-11. Pt tx for rhabdomyolysis with IV fluids. Pt w/elevated troponin most likely 2/2 to rhabdomyolysis , ECG w/o ischemic changes .  Pt was negative for covid-19. 89 y/o female with hx of HTN was brought to the ED s/p fall, mechanical and was on the floor until she was found by her neighbor. Pt admitted with s/p mechanical fall, noted with complication of rhabdomyolysis ck 1700 continues on low dose IVF. Also noted with elevated troponin in the setting of rhbdomyolysis, patient asx with wnl ekg, and trend remained at 0.22. CK level down trending and s/p IVF pt encouraged to c/w oral hydration. Also given fall patient had pan scan which revealed b/l GGO on imaging, pt remains afebrile no other sx aside from imaging and hypoxia. CAP, empirically remains on azithromycin/ rocephin. Covid negative. Also with pan scan imaging showing old t2/t3 and t11 compression fx. PT consulted and recommended [facility]. Pt remains medically stable to be transferred to [facility]. Pt to f/u with physician at the facility.  Advance ddirectives discussed and patient is DNR/DNI MOLST completed and daughter Arianna in agreement.                 Vital Signs Last 24 Hrs    T(C): 36.3 (05 Apr 2020 08:14), Max: 37.1 (04 Apr 2020 15:28)    T(F): 97.4 (05 Apr 2020 08:14), Max: 98.7 (04 Apr 2020 15:28)    HR: 90 (05 Apr 2020 08:14) (86 - 90)    BP: 137/62 (05 Apr 2020 08:14) (134/71 - 164/90)    BP(mean): --    RR: 20 (05 Apr 2020 08:14) (20 - 22)    SpO2: 93% (05 Apr 2020 08:14) (92% - 98%)                CONSTITUTIONAL: Well appearing, well nourished, awake, alert and in no apparent distress    CARDIAC: Normal rate, regular rhythm.  Heart sounds S1, S2.  No murmurs, rubs or gallops     RESPIRATORY: Breath sounds clear and equal bilaterally. No wheezes, rhales or rhonchi    GASTROENTEROLOGY: Soft nt nd bs +     EXTREMITIES: No edema, cyanosis or deformity     SKIN: No rash, skin turgor poor, multiple areas with ecchymosis             Discharge planning took 46 minutes

## 2020-04-04 NOTE — PROGRESS NOTE ADULT - ASSESSMENT
9 y/o female with PMH of HTN was brought to the ED for fall. Patient said she was walking to check her garbage and fell on her back. She was too weak to get up so was just lying on the floor until found by her neighbor who call 911.  She did not have LOC, as per patient she remembered everything. Patient was seen in the ED in 01/2020 for similar complaint. Attempt made to call patient's daughter Kaitlynn ([phone]) no answer. Note from prior reviewed, family stated at the time that patient was not on any medication and she live alone. She was seen by PT, rec: home with assist. She was pan scan in the ED, no acute fracture. CXR: Patchy bilateral interstitial lung opacities for which developing pneumonia is considered. COVID. Initial test negative, but will treat for possible false negative.     Rhabdomyolysis 2/2 fall  -S/P 500 cc bolus. Currently on fluids 60 cc q h for 12 hours   - CK: 1780 continue to trend  - Patient pan scan, no acute finding   - Gently hydration   - Trend CK   - Fall precaution   - PT evaluation pending    Suspected COVID   - CXR as mentioned above, concerning for CoVID-19   - Patient with mild desaturations initially but currently sat'ing well. no fever and no complaint   - Will monitor patient for now     Elevated troponin   - Troponin: 0.20   - No ischemic change on the ECG   - Patient has no acute complaint   - Aspirin 325m once given in the ED   - Likely due to rhabdo   - Trend cardiac enzymes   - Echo pending.     HTN   - As per ED note, daughter said patient is not on any medication   - Monitor BP, will give hydralazine PRN for SBP > 150    Supportive DVT   - DVT prophylaxis: heparin sc   - Diet: DASH     - Detailed conversation with daughter Kaitlynn [phone]. States her mother is very independent but does not take care of herself. Daughter understands mother may have COVID and is high risk. Also confirmed advanced directives. 89 y/o female with hx of HTN was brought to the ED s/p fall, mechanical and was on the floor until she was found by her neighbor. Pt admitted with s/p mechanical fall, noted with complication of rhabdomyolysis ck 1700 continues on low dose IVF. Also noted with elevated troponin in the setting of rhbdomyolysis, patient asx with wnl ekg, will trend. Also given fall patient had pan scan which revealed b/l GGO on imaging with suspected covid, pt remains afebrile no other sx aside from imaging and hypoxia. Suspected false negative test vs CAP, empirically remains on azithromycin/ rocephin. Also with pan scan imaging showing old t2/t3 and t11 compression fx. PT consulted.     Acute respiratory failure with hypoxia 2/2 CAP, although covid negative could be false negative   -will empirically manage as CAP suspected atypical resp farida infection   - afebrile   - no leukocytosis but with left neutrophilic shift   - will f/u procalcitonin, ferritin, crp and ldh   - ct chest with b/l GGO Upper lobes   - c/w supplemental o2 2 l nc   - c/w azithromycin and rocephin D#1/5   - c/w florastor po bid   - c/w incentive spirometry   - pt will need repeat ct chest in 4-6 weeks    S/p mechanical fall with complication of Rhabdomyolysis    -possibly secondary to hypoxia from lung infection   -pt clinically with no complaints  - pan scan imaging with noted old compression fx t2/t3 and t11    - CK: 1780 will continue to trend  - c/w IVF 60 cc q h for 12 hours  - c/w Fall precautions    - PT evaluation pending    Elevated troponin   - Likely due to rhabdomyolysis   - Troponin: 0.20  - awaiting repeat; will trend   - No ischemic change on the ECG   - Patient is asymptomatic   - c/w asa 81mg po qd   -f/u tte     HTN   -bp stable   - will monitor off of bp meds   - As per ED note, daughter said patient is not on any medication     Supportive DVT   - DVT prophylaxis: heparin sc   - Diet: DASH     Advanced Directive: DNR/DNI - MOLST Completed   Next of kin: triston Calderón ([phone]  - Detailed conversation with daughter Kaitlynn, states her mother is very independent but does not take care of herself. Daughter understands mother may have COVID and is high risk. Also confirmed advanced directives.     Dispo: Pt is independent in ADLs, [place], lives alone and does everything on her own, daughter visits her frequently. PT consulted. 89 y/o female with hx of HTN was brought to the ED s/p fall, mechanical and was on the floor until she was found by her neighbor. Pt admitted with s/p mechanical fall, noted with complication of rhabdomyolysis ck 1700 continues on low dose IVF. Also noted with elevated troponin in the setting of rhbdomyolysis, patient asx with wnl ekg, will trend. Also given fall patient had pan scan which revealed b/l GGO on imaging with suspected covid, pt remains afebrile no other sx aside from imaging and hypoxia. Suspected false negative test vs CAP, empirically remains on azithromycin/ rocephin. Also with pan scan imaging showing old t2/t3 and t11 compression fx. PT consulted.     Acute respiratory failure with hypoxia 2/2 CAP, although covid negative could be false negative   -will empirically manage as CAP suspected atypical resp farida infection   - afebrile   - no leukocytosis but with left neutrophilic shift   - will f/u procalcitonin, ferritin, crp and ldh   - ct chest with b/l GGO Upper lobes   - c/w supplemental o2 2 l nc   - c/w azithromycin and rocephin D#1/5   - c/w florastor po bid   - c/w incentive spirometry   - pt will need repeat ct chest in 4-6 weeks    S/p mechanical fall with complication of Rhabdomyolysis    -possibly secondary to hypoxia from lung infection   -pt clinically with no complaints  - pan scan imaging with noted old compression fx t2/t3 and t11    - CK: 1780 will continue to trend  - c/w IVF 60 cc q h for 12 hours  - c/w Fall precautions    - PT evaluation pending    Elevated troponin   - Likely due to rhabdomyolysis   - Troponin: 0.20  - awaiting repeat; will trend   - No ischemic change on the ECG   - Patient is asymptomatic   - c/w asa 81mg po qd   -f/u tte     HTN   -bp stable   - will monitor off of bp meds   - As per ED note, daughter said patient is not on any medication     Supportive DVT   - DVT prophylaxis: heparin sc   - Diet: DASH     Advanced Directive: DNR/DNI - MOLST Completed   Next of kin: triston Calderón ([phone]    Dispo: Pt is independent in ADLs, [place], lives alone and does everything on her own, daughter visits her frequently.  Detailed conversation with daughter Kaitlynn, states her mother is very independent but does not take care of herself. Daughter understands mother may have COVID and is high risk. Also confirmed advanced directives.  PT consulted. 89 y/o female with hx of HTN was brought to the ED s/p fall, mechanical and was on the floor until she was found by her neighbor. Pt admitted with s/p mechanical fall, noted with complication of rhabdomyolysis ck 1700 continues on low dose IVF. Also noted with elevated troponin in the setting of rhbdomyolysis, patient asx with wnl ekg, will trend. Also given fall patient had pan scan which revealed b/l GGO on imaging, pt remains afebrile no other sx aside from imaging and hypoxia. CAP, empirically remains on azithromycin/ rocephin. Covid negative. Also with pan scan imaging showing old t2/t3 and t11 compression fx. PT consulted.     Acute respiratory failure with hypoxia 2/2 CAP, covid negative  -will empirically manage as CAP suspected atypical resp farida infection   - afebrile   - no leukocytosis but with left neutrophilic shift   - will f/u procalcitonin, ferritin, crp and ldh   - ct chest with b/l GGO Upper lobes   - c/w supplemental o2 2 l nc   - c/w azithromycin and rocephin D#1/5   - c/w florastor po bid   - c/w incentive spirometry   - pt will need repeat ct chest in 4-6 weeks    S/p mechanical fall with complication of Rhabdomyolysis    -possibly secondary to hypoxia from lung infection   -pt clinically with no complaints  - pan scan imaging with noted old compression fx t2/t3 and t11    - CK: 1780 will continue to trend  - c/w IVF 60 cc q h for 12 hours  - c/w Fall precautions    - PT evaluation pending    Elevated troponin   - Likely due to rhabdomyolysis   - Troponin: 0.20  - awaiting repeat; will trend   - No ischemic change on the ECG   - Patient is asymptomatic   - c/w asa 81mg po qd   -f/u tte     HTN   -bp stable   - will monitor off of bp meds   - As per ED note, daughter said patient is not on any medication     Supportive DVT   - DVT prophylaxis: heparin sc   - Diet: DASH     Advanced Directive: DNR/DNI - MOLST Completed   Next of kin: triston Calderón ([phone]    Dispo: Pt is independent in ADLs, [place], lives alone and does everything on her own, daughter visits her frequently.  Detailed conversation with daughter Kaitlynn, states her mother is very independent but does not take care of herself. Daughter understands mother is high risk. Also confirmed advanced directives.  PT consulted.

## 2020-04-04 NOTE — DISCHARGE NOTE PROVIDER - NSDCMRMEDTOKEN_GEN_ALL_CORE_FT
aspirin 81 mg oral tablet, chewable: 1 tab(s) orally once a day  folic acid: 1 milligram(s) orally once a day  Iron 100 Plus: 1 tab(s) orally once a day  Lasix: 20 milligram(s) orally once a day  Norvasc 5 mg oral tablet: 1 tab(s) orally once a day acetaminophen 325 mg oral tablet: 2 tab(s) orally every 6 hours, As needed, Temp greater or equal to 38C (100.4F), Mild Pain (1 - 3)  albuterol 90 mcg/inh inhalation aerosol: 2 puff(s) inhaled every 6 hours  ascorbic acid 500 mg oral tablet: 1 tab(s) orally once a day  aspirin 81 mg oral tablet, chewable: 1 tab(s) orally once a day  azithromycin 500 mg oral tablet: 1 tab(s) orally once a day x 3 days   Ceftin 250 mg oral tablet: 1 tab(s) orally every 12 hours x 4 days  folic acid: 1 milligram(s) orally once a day  guaiFENesin 100 mg/5 mL oral liquid: 5 milliliter(s) orally every 6 hours, As needed, Cough  Iron 100 Plus: 1 tab(s) orally once a day  Multiple Vitamins oral tablet: 1 tab(s) orally once a day  saccharomyces boulardii lyo 250 mg oral capsule: 1 cap(s) orally 2 times a day x 7 days   thiamine 100 mg oral tablet: 1 tab(s) orally once a day

## 2020-04-04 NOTE — ED ADULT NURSE REASSESSMENT NOTE - NS ED NURSE REASSESS COMMENT FT1
report received from octavio roach.  pt. sitting up in bed A&Ox1.  O2 wnl on room air.  Awaiting bed placement, will continue to monitor.

## 2020-04-04 NOTE — DISCHARGE NOTE PROVIDER - NSDCFUADDAPPT_GEN_ALL_CORE_FT
Please follow up with the physician at the facility. After discharge follow up with your primary care physician.

## 2020-04-04 NOTE — GOALS OF CARE CONVERSATION - ADVANCED CARE PLANNING - CONVERSATION DETAILS
Discussed with the patient in regards to her current hospitalization, plan of care and she understands she has pneumonia and due to her fall she has rhabdomyolysis. She wants to go home but understands for now needs to remain hospitalized. D/w her in regards to her advanced directives and she reported she would want to be a DNR/DNI she stated she has a living will and that her daughter is aware of her wishes as well. JAVIER completed. This was also confirmed with her daughter.

## 2020-04-04 NOTE — DISCHARGE NOTE PROVIDER - NSDCCPCAREPLAN_GEN_ALL_CORE_FT
PRINCIPAL DISCHARGE DIAGNOSIS  Diagnosis: Rhabdomyolysis  Assessment and Plan of Treatment: Due to you fall you have rhabdomyolysis , you were treated appropriately with IV fluids and we monitor you blood levels. At this time you are medical stable for discharge.      SECONDARY DISCHARGE DIAGNOSES  Diagnosis: Hypoxia  Assessment and Plan of Treatment:     Diagnosis: Elevated CK  Assessment and Plan of Treatment:     Diagnosis: Fall  Assessment and Plan of Treatment: PRINCIPAL DISCHARGE DIAGNOSIS  Diagnosis: Fall at home  Assessment and Plan of Treatment: Mechanical fall, pan cat scan of the chest, head, spine and abdomen completed and noted old compression fractures of T2/T3 and T11.   X rays of the R foot/ knee and left elbow and pelvis negative for fractures.   Patient to continue with physical therapy, at baseline lives alone and ambulates with a walker.      SECONDARY DISCHARGE DIAGNOSES  Diagnosis: Community acquired pneumonia  Assessment and Plan of Treatment: Continue course of antibiotics as prescribed. Has never had a fever but at times can require low dose oxygen  2liters via nasal canula currently on room air. Continue with mobilization. Repeat cat scan in 4-6 weeks to ensure resolution. Continue with incentive spirometery.    Diagnosis: Rhabdomyolysis  Assessment and Plan of Treatment: Due to you fall you have rhabdomyolysis , you were treated appropriately with IV fluids and we monitor you blood levels. At this time you are medical stable for discharge. Continue with oral hydration.    Diagnosis: Essential hypertension  Assessment and Plan of Treatment: Continue with medications as prescribed.    Diagnosis: Advance directive discussed with patient  Assessment and Plan of Treatment: JAVIER completed DNR/DNI and daughter is next of kin- Arianna [phone] PRINCIPAL DISCHARGE DIAGNOSIS  Diagnosis: Fall at home  Assessment and Plan of Treatment: Mechanical fall, pan cat scan of the chest, head, spine and abdomen completed and noted old compression fractures of T2/T3 and T11.   X rays of the R foot/ knee and left elbow and pelvis negative for fractures.   Patient to continue with physical therapy, at baseline lives alone and ambulates with a walker.      SECONDARY DISCHARGE DIAGNOSES  Diagnosis: Community acquired pneumonia  Assessment and Plan of Treatment: Continue course of antibiotics as prescribed. Has never had a fever but at times can require low dose oxygen  2liters via nasal canula currently on room air. Continue with mobilization. Repeat cat scan in 4-6 weeks to ensure resolution. Continue with incentive spirometery.    Diagnosis: Rhabdomyolysis  Assessment and Plan of Treatment: Due to you fall you have rhabdomyolysis , you were treated appropriately with IV fluids and we monitor you blood levels. At this time you are medical stable for discharge. Continue with oral hydration.    Diagnosis: PVD (peripheral vascular disease)  Assessment and Plan of Treatment: Bilateral lower extremity wounds/ venous ulcers with unna boots bilaterally. Patient follows with vascular surgery once a week for wound care.    Diagnosis: Essential hypertension  Assessment and Plan of Treatment: Continue with medications as prescribed.    Diagnosis: Advance directive discussed with patient  Assessment and Plan of Treatment: MOLST completed DNR/DNI and daughter is next of kin- Arianna [phone]

## 2020-04-04 NOTE — DISCHARGE NOTE PROVIDER - CARE PROVIDER_API CALL
Sheryl Osborn (PA)  Physician Assistant Services  [address] [place] [address], [address]  [place] [address]  Phone: [phone]  Fax: [phone]  Follow Up Time:

## 2020-04-04 NOTE — PROGRESS NOTE ADULT - SUBJECTIVE AND OBJECTIVE BOX
BRIAN SANDOVAL  89y, Female    Complaints: Rhabdomyolysis secondary to fall  Subjective: Pt seen and examined at bedside. Concerns for pt being COVID because of ground glass opacities and cough, with hypoxia on the monitor. Pt also confirmed DNR/DNI status. MOLST completed and placed in chart. Pending echo to confirm EF. Currently receiving gentle hydration. No chest pain, no shortness of breath, pt does admit to dizziness, but denies, nausea, vomiting. ROS otherwise negative       Vital Signs Last 24 Hrs  T(C): 36.7 (04 Apr 2020 00:09), Max: 36.7 (03 Apr 2020 16:16)  T(F): 98.1 (04 Apr 2020 00:09), Max: 98.1 (04 Apr 2020 00:09)  HR: 70 (04 Apr 2020 02:13) (70 - 90)  BP: 147/68 (04 Apr 2020 02:13) (140/74 - 178/80)  BP(mean): 111 (04 Apr 2020 00:09) (101 - 111)  RR: 17 (04 Apr 2020 02:13) (16 - 18)  SpO2: 100% (04 Apr 2020 02:13) (95% - 100%)          LABS:                          14.0   7.37  )-----------( 157      ( 03 Apr 2020 18:35 )             45.3   04-03    138  |  101  |  25.0<H>  ----------------------------<  132<H>  4.4   |  24.0  |  0.95    Ca    9.4      03 Apr 2020 18:35  Phos  2.8     04-03  Mg     2.0     04-03    TPro  7.7  /  Alb  3.9  /  TBili  0.4  /  DBili  x   /  AST  54<H>  /  ALT  18  /  AlkPhos  90  04-03      MedsMEDICATIONS  (STANDING):  ALBUTerol    90 MICROgram(s) HFA Inhaler 2 Puff(s) Inhalation every 6 hours  ascorbic acid 500 milliGRAM(s) Oral three times a day  aspirin  chewable 324 milliGRAM(s) Oral daily  heparin  Injectable 5000 Unit(s) SubCutaneous every 8 hours  multivitamin 1 Tablet(s) Oral daily  sodium chloride 0.9%. 1000 milliLiter(s) (60 mL/Hr) IV Continuous <Continuous>  thiamine 200 milliGRAM(s) Oral <User Schedule>    MEDICATIONS  (PRN):  acetaminophen   Tablet .. 650 milliGRAM(s) Oral every 6 hours PRN Temp greater or equal to 38C (100.4F), Mild Pain (1 - 3)      HEALTH ISSUES - PROBLEM Dx: BRIAN SANDOVAL  89y, Female    Complaints: Rhabdomyolysis secondary to fall  Subjective: Pt seen and examined at bedside. Concerns for pt being COVID because of ground glass opacities and cough, with hypoxia on the monitor. Pt also confirmed DNR/DNI status. MOLST completed and placed in chart. Pending echo to confirm EF. Currently receiving gentle hydration. No chest pain, no shortness of breath, pt does admit to dizziness, but denies, nausea, vomiting. ROS otherwise negative       Vital Signs Last 24 Hrs  T(C): 36.7 (04 Apr 2020 00:09), Max: 36.7 (03 Apr 2020 16:16)  T(F): 98.1 (04 Apr 2020 00:09), Max: 98.1 (04 Apr 2020 00:09)  HR: 70 (04 Apr 2020 02:13) (70 - 90)  BP: 147/68 (04 Apr 2020 02:13) (140/74 - 178/80)  BP(mean): 111 (04 Apr 2020 00:09) (101 - 111)  RR: 17 (04 Apr 2020 02:13) (16 - 18)  SpO2: 100% (04 Apr 2020 02:13) (95% - 100%)    CONSTITUTIONAL: Well appearing, well nourished, awake, alert and in no apparent distress  CARDIAC: Normal rate, regular rhythm.  Heart sounds S1, S2.  No murmurs, rubs or gallops   RESPIRATORY: Breath sounds clear and equal bilaterally. No wheezes, rhales or rhonchi  GASTROENTEROLOGY: Soft nt nd bs +   EXTREMITIES: No edema, cyanosis or deformity   SKIN: No rash, skin turgor poor, multiple areas with ecchymosis       LABS:                          14.0   7.37  )-----------( 157      ( 03 Apr 2020 18:35 )             45.3   04-03    138  |  101  |  25.0<H>  ----------------------------<  132<H>  4.4   |  24.0  |  0.95    Ca    9.4      03 Apr 2020 18:35  Phos  2.8     04-03  Mg     2.0     04-03    TPro  7.7  /  Alb  3.9  /  TBili  0.4  /  DBili  x   /  AST  54<H>  /  ALT  18  /  AlkPhos  90  04-03      MedsMEDICATIONS  (STANDING):  ALBUTerol    90 MICROgram(s) HFA Inhaler 2 Puff(s) Inhalation every 6 hours  ascorbic acid 500 milliGRAM(s) Oral three times a day  aspirin  chewable 324 milliGRAM(s) Oral daily  heparin  Injectable 5000 Unit(s) SubCutaneous every 8 hours  multivitamin 1 Tablet(s) Oral daily  sodium chloride 0.9%. 1000 milliLiter(s) (60 mL/Hr) IV Continuous <Continuous>  thiamine 200 milliGRAM(s) Oral <User Schedule>    MEDICATIONS  (PRN):  acetaminophen   Tablet .. 650 milliGRAM(s) Oral every 6 hours PRN Temp greater or equal to 38C (100.4F), Mild Pain (1 - 3)      HEALTH ISSUES - PROBLEM Dx:

## 2020-04-05 ENCOUNTER — TRANSCRIPTION ENCOUNTER (OUTPATIENT)
Age: 85
End: 2020-04-05

## 2020-04-05 VITALS
OXYGEN SATURATION: 98 % | DIASTOLIC BLOOD PRESSURE: 80 MMHG | SYSTOLIC BLOOD PRESSURE: 166 MMHG | TEMPERATURE: 98 F | RESPIRATION RATE: 18 BRPM | HEART RATE: 86 BPM

## 2020-04-05 PROCEDURE — 85027 COMPLETE CBC AUTOMATED: CPT

## 2020-04-05 PROCEDURE — 73562 X-RAY EXAM OF KNEE 3: CPT

## 2020-04-05 PROCEDURE — 94640 AIRWAY INHALATION TREATMENT: CPT

## 2020-04-05 PROCEDURE — 72170 X-RAY EXAM OF PELVIS: CPT

## 2020-04-05 PROCEDURE — 83880 ASSAY OF NATRIURETIC PEPTIDE: CPT

## 2020-04-05 PROCEDURE — 99239 HOSP IP/OBS DSCHRG MGMT >30: CPT

## 2020-04-05 PROCEDURE — 87635 SARS-COV-2 COVID-19 AMP PRB: CPT

## 2020-04-05 PROCEDURE — 97163 PT EVAL HIGH COMPLEX 45 MIN: CPT

## 2020-04-05 PROCEDURE — 82728 ASSAY OF FERRITIN: CPT

## 2020-04-05 PROCEDURE — 73620 X-RAY EXAM OF FOOT: CPT

## 2020-04-05 PROCEDURE — 84484 ASSAY OF TROPONIN QUANT: CPT

## 2020-04-05 PROCEDURE — 73080 X-RAY EXAM OF ELBOW: CPT

## 2020-04-05 PROCEDURE — 74177 CT ABD & PELVIS W/CONTRAST: CPT

## 2020-04-05 PROCEDURE — 71045 X-RAY EXAM CHEST 1 VIEW: CPT

## 2020-04-05 PROCEDURE — 93005 ELECTROCARDIOGRAM TRACING: CPT

## 2020-04-05 PROCEDURE — 83735 ASSAY OF MAGNESIUM: CPT

## 2020-04-05 PROCEDURE — 82550 ASSAY OF CK (CPK): CPT

## 2020-04-05 PROCEDURE — 72125 CT NECK SPINE W/O DYE: CPT

## 2020-04-05 PROCEDURE — 84100 ASSAY OF PHOSPHORUS: CPT

## 2020-04-05 PROCEDURE — 80053 COMPREHEN METABOLIC PANEL: CPT

## 2020-04-05 PROCEDURE — 93307 TTE W/O DOPPLER COMPLETE: CPT

## 2020-04-05 PROCEDURE — 83615 LACTATE (LD) (LDH) ENZYME: CPT

## 2020-04-05 PROCEDURE — 70450 CT HEAD/BRAIN W/O DYE: CPT

## 2020-04-05 PROCEDURE — 86140 C-REACTIVE PROTEIN: CPT

## 2020-04-05 PROCEDURE — 70486 CT MAXILLOFACIAL W/O DYE: CPT

## 2020-04-05 PROCEDURE — 82553 CREATINE MB FRACTION: CPT

## 2020-04-05 PROCEDURE — 82962 GLUCOSE BLOOD TEST: CPT

## 2020-04-05 PROCEDURE — 84145 PROCALCITONIN (PCT): CPT

## 2020-04-05 PROCEDURE — 71260 CT THORAX DX C+: CPT

## 2020-04-05 PROCEDURE — 99285 EMERGENCY DEPT VISIT HI MDM: CPT

## 2020-04-05 PROCEDURE — 36415 COLL VENOUS BLD VENIPUNCTURE: CPT

## 2020-04-05 RX ORDER — AZITHROMYCIN 500 MG/1
1 TABLET, FILM COATED ORAL
Qty: 0 | Refills: 0 | DISCHARGE
Start: 2020-04-05

## 2020-04-05 RX ORDER — ALBUTEROL 90 UG/1
2 AEROSOL, METERED ORAL
Qty: 0 | Refills: 0 | DISCHARGE
Start: 2020-04-05

## 2020-04-05 RX ORDER — ASCORBIC ACID 60 MG
1 TABLET,CHEWABLE ORAL
Qty: 0 | Refills: 0 | DISCHARGE
Start: 2020-04-05

## 2020-04-05 RX ORDER — ASPIRIN/CALCIUM CARB/MAGNESIUM 324 MG
1 TABLET ORAL
Qty: 0 | Refills: 0 | DISCHARGE
Start: 2020-04-05

## 2020-04-05 RX ORDER — THIAMINE MONONITRATE (VIT B1) 100 MG
1 TABLET ORAL
Qty: 0 | Refills: 0 | DISCHARGE
Start: 2020-04-05

## 2020-04-05 RX ORDER — ACETAMINOPHEN 500 MG
2 TABLET ORAL
Qty: 0 | Refills: 0 | DISCHARGE
Start: 2020-04-05

## 2020-04-05 RX ORDER — SACCHAROMYCES BOULARDII 250 MG
1 POWDER IN PACKET (EA) ORAL
Qty: 0 | Refills: 0 | DISCHARGE
Start: 2020-04-05

## 2020-04-05 RX ADMIN — ALBUTEROL 2 PUFF(S): 90 AEROSOL, METERED ORAL at 05:42

## 2020-04-05 RX ADMIN — Medication 500 MILLIGRAM(S): at 05:17

## 2020-04-05 RX ADMIN — HEPARIN SODIUM 5000 UNIT(S): 5000 INJECTION INTRAVENOUS; SUBCUTANEOUS at 04:45

## 2020-04-05 RX ADMIN — Medication 1 TABLET(S): at 14:01

## 2020-04-05 RX ADMIN — Medication 81 MILLIGRAM(S): at 14:01

## 2020-04-05 RX ADMIN — Medication 500 MILLIGRAM(S): at 14:06

## 2020-04-05 RX ADMIN — ALBUTEROL 2 PUFF(S): 90 AEROSOL, METERED ORAL at 01:27

## 2020-04-05 RX ADMIN — AZITHROMYCIN 500 MILLIGRAM(S): 500 TABLET, FILM COATED ORAL at 14:01

## 2020-04-05 RX ADMIN — HEPARIN SODIUM 5000 UNIT(S): 5000 INJECTION INTRAVENOUS; SUBCUTANEOUS at 14:06

## 2020-04-05 RX ADMIN — Medication 200 MILLIGRAM(S): at 05:42

## 2020-04-05 RX ADMIN — Medication 250 MILLIGRAM(S): at 04:45

## 2020-04-05 NOTE — ED ADULT NURSE REASSESSMENT NOTE - NS ED NURSE REASSESS COMMENT FT1
patient was on enhanced supervision since approximately 9am this morning with 15 min checks, patient continously attempted to climb out of bed, was redirected multiple times.  Patient awake, confused, but redirectable.

## 2020-04-05 NOTE — PHYSICAL THERAPY INITIAL EVALUATION ADULT - ADDITIONAL COMMENTS
Pt A&Ox1, difficult getting social history. Pt reports living alone. Per EMR was independent with all prior to admission. Unknown if there are steps or if pt owns DME.

## 2020-04-05 NOTE — ED ADULT NURSE REASSESSMENT NOTE - NS ED NURSE REASSESS COMMENT FT1
taking rectal temp, patient had unstageable areas to buttocks, Dr. Jiang made aware of patient pulled iv out earlier, advised to hold ceftriaxone and ok to discharge to sub acute rehab.

## 2020-04-05 NOTE — PHYSICAL THERAPY INITIAL EVALUATION ADULT - PERTINENT HX OF CURRENT PROBLEM, REHAB EVAL
brought to the ED s/p fall, mechanical and was on the floor until she was found by her neighbor. Pt admitted with s/p mechanical fall, noted with complication of rhabdomyolysis

## 2020-04-05 NOTE — DISCHARGE NOTE NURSING/CASE MANAGEMENT/SOCIAL WORK - PATIENT PORTAL LINK FT
You can access the FollowMyHealth Patient Portal offered by [facility] by registering at the following website: [email or web address]. By joining [employer]’s FollowMyHealth portal, you will also be able to view your health information using other applications (apps) compatible with our system.

## 2020-04-05 NOTE — CHART NOTE - NSCHARTNOTEFT_GEN_A_CORE
As per Dr. Villa, patient is medically stable for discharge. Patient gave consent to discuss discharge planning with patient's daughter, Arianna ([phone]). As per PT, patient is recommended to attend [facility] upon discharge. JUNE placed call to patient's daughter to discuss facility choices. Arianna requested SW email list of [facility] facilities to be reviewed. JUNE emailed list of [facility] facilities to Arianna ([email or web address]). JUNE requested JAMES with [facility]. JUNE continues to follow to facilitate safe and appropriate discharge.

## 2020-04-05 NOTE — ED ADULT NURSE REASSESSMENT NOTE - NS ED NURSE REASSESS COMMENT FT1
pt. remains confused this morning; oriented only to self.  Pt. tearful and sad r/t fall and states "I won't be able to be on my own anymore."  Reassurance provided to pt.

## 2020-04-05 NOTE — ED ADULT NURSE REASSESSMENT NOTE - NS ED NURSE REASSESS COMMENT FT1
Pt sleeping comfortably on stretcher.  No nonverbal indicators of pain present.  Respirations even and unlabored.; O2 remains wnl on room air.  Awaiting bed placement.  PIV wnl; flushing without difficulty.  In NAD, will continue to monitor.

## 2020-04-05 NOTE — PHYSICAL THERAPY INITIAL EVALUATION ADULT - GENERAL OBSERVATIONS, REHAB EVAL
Pt received in stretcher, all lines pulled out of her, breathing on RA in NAD, in 0/10 pain, agreeable to PT evaluation

## 2020-12-23 ENCOUNTER — INPATIENT (INPATIENT)
Facility: HOSPITAL | Age: 85
LOS: 10 days | Discharge: HOME | DRG: 564 | End: 2021-01-03
Attending: HOSPITALIST
Payer: MEDICARE

## 2020-12-23 VITALS
HEART RATE: 68 BPM | DIASTOLIC BLOOD PRESSURE: 75 MMHG | RESPIRATION RATE: 22 BRPM | SYSTOLIC BLOOD PRESSURE: 118 MMHG | HEIGHT: 61 IN

## 2020-12-23 DIAGNOSIS — Z98.49 CATARACT EXTRACTION STATUS, UNSPECIFIED EYE: Chronic | ICD-10-CM

## 2020-12-23 DIAGNOSIS — J18.9 PNEUMONIA, UNSPECIFIED ORGANISM: ICD-10-CM

## 2020-12-23 LAB
ALBUMIN SERPL ELPH-MCNC: 3.3 G/DL — SIGNIFICANT CHANGE UP (ref 3.3–5.2)
ALP SERPL-CCNC: 137 U/L — HIGH (ref 40–120)
ALT FLD-CCNC: 43 U/L — HIGH
ANION GAP SERPL CALC-SCNC: 17 MMOL/L — SIGNIFICANT CHANGE UP (ref 5–17)
APTT BLD: 27.7 SEC — SIGNIFICANT CHANGE UP (ref 27.5–35.5)
AST SERPL-CCNC: 132 U/L — HIGH
BASOPHILS # BLD AUTO: 0 K/UL — SIGNIFICANT CHANGE UP (ref 0–0.2)
BASOPHILS NFR BLD AUTO: 0 % — SIGNIFICANT CHANGE UP (ref 0–2)
BILIRUB SERPL-MCNC: 0.7 MG/DL — SIGNIFICANT CHANGE UP (ref 0.4–2)
BUN SERPL-MCNC: 29 MG/DL — HIGH (ref 8–20)
CALCIUM SERPL-MCNC: 9.2 MG/DL — SIGNIFICANT CHANGE UP (ref 8.6–10.2)
CHLORIDE SERPL-SCNC: 101 MMOL/L — SIGNIFICANT CHANGE UP (ref 98–107)
CK MB CFR SERPL CALC: 76 NG/ML — HIGH (ref 0–6.7)
CK SERPL-CCNC: 2830 U/L — HIGH (ref 25–170)
CO2 SERPL-SCNC: 20 MMOL/L — LOW (ref 22–29)
CREAT SERPL-MCNC: 0.86 MG/DL — SIGNIFICANT CHANGE UP (ref 0.5–1.3)
EOSINOPHIL # BLD AUTO: 0 K/UL — SIGNIFICANT CHANGE UP (ref 0–0.5)
EOSINOPHIL NFR BLD AUTO: 0 % — SIGNIFICANT CHANGE UP (ref 0–6)
GIANT PLATELETS BLD QL SMEAR: PRESENT — SIGNIFICANT CHANGE UP
GLUCOSE SERPL-MCNC: 100 MG/DL — HIGH (ref 70–99)
HCT VFR BLD CALC: 46 % — HIGH (ref 34.5–45)
HGB BLD-MCNC: 14.3 G/DL — SIGNIFICANT CHANGE UP (ref 11.5–15.5)
INR BLD: 1.26 RATIO — HIGH (ref 0.88–1.16)
LACTATE BLDV-MCNC: 2.2 MMOL/L — HIGH (ref 0.5–2)
LACTATE BLDV-MCNC: 3.5 MMOL/L — HIGH (ref 0.5–2)
LYMPHOCYTES # BLD AUTO: 0.32 K/UL — LOW (ref 1–3.3)
LYMPHOCYTES # BLD AUTO: 1.7 % — LOW (ref 13–44)
MAGNESIUM SERPL-MCNC: 2.1 MG/DL — SIGNIFICANT CHANGE UP (ref 1.6–2.6)
MANUAL SMEAR VERIFICATION: SIGNIFICANT CHANGE UP
MCHC RBC-ENTMCNC: 27.7 PG — SIGNIFICANT CHANGE UP (ref 27–34)
MCHC RBC-ENTMCNC: 31.1 GM/DL — LOW (ref 32–36)
MCV RBC AUTO: 89.1 FL — SIGNIFICANT CHANGE UP (ref 80–100)
MONOCYTES # BLD AUTO: 0.48 K/UL — SIGNIFICANT CHANGE UP (ref 0–0.9)
MONOCYTES NFR BLD AUTO: 2.6 % — SIGNIFICANT CHANGE UP (ref 2–14)
NEUTROPHILS # BLD AUTO: 17.76 K/UL — HIGH (ref 1.8–7.4)
NEUTROPHILS NFR BLD AUTO: 91.3 % — HIGH (ref 43–77)
NEUTS BAND # BLD: 4.4 % — SIGNIFICANT CHANGE UP (ref 0–8)
NT-PROBNP SERPL-SCNC: HIGH PG/ML (ref 0–300)
PHOSPHATE SERPL-MCNC: 2.8 MG/DL — SIGNIFICANT CHANGE UP (ref 2.4–4.7)
PLAT MORPH BLD: NORMAL — SIGNIFICANT CHANGE UP
PLATELET # BLD AUTO: 243 K/UL — SIGNIFICANT CHANGE UP (ref 150–400)
POTASSIUM SERPL-MCNC: 4.5 MMOL/L — SIGNIFICANT CHANGE UP (ref 3.5–5.3)
POTASSIUM SERPL-SCNC: 4.5 MMOL/L — SIGNIFICANT CHANGE UP (ref 3.5–5.3)
PROT SERPL-MCNC: 8.5 G/DL — SIGNIFICANT CHANGE UP (ref 6.6–8.7)
PROTHROM AB SERPL-ACNC: 14.5 SEC — HIGH (ref 10.6–13.6)
RBC # BLD: 5.16 M/UL — SIGNIFICANT CHANGE UP (ref 3.8–5.2)
RBC # FLD: 16.6 % — HIGH (ref 10.3–14.5)
RBC BLD AUTO: NORMAL — SIGNIFICANT CHANGE UP
SARS-COV-2 RNA SPEC QL NAA+PROBE: SIGNIFICANT CHANGE UP
SODIUM SERPL-SCNC: 138 MMOL/L — SIGNIFICANT CHANGE UP (ref 135–145)
TROPONIN T SERPL-MCNC: 0.07 NG/ML — HIGH (ref 0–0.06)
WBC # BLD: 18.56 K/UL — HIGH (ref 3.8–10.5)
WBC # FLD AUTO: 18.56 K/UL — HIGH (ref 3.8–10.5)

## 2020-12-23 PROCEDURE — 99291 CRITICAL CARE FIRST HOUR: CPT | Mod: CS

## 2020-12-23 PROCEDURE — 72170 X-RAY EXAM OF PELVIS: CPT | Mod: 26

## 2020-12-23 PROCEDURE — 93010 ELECTROCARDIOGRAM REPORT: CPT

## 2020-12-23 PROCEDURE — 71045 X-RAY EXAM CHEST 1 VIEW: CPT | Mod: 26

## 2020-12-23 PROCEDURE — 99223 1ST HOSP IP/OBS HIGH 75: CPT

## 2020-12-23 PROCEDURE — 72125 CT NECK SPINE W/O DYE: CPT | Mod: 26

## 2020-12-23 PROCEDURE — 99497 ADVNCD CARE PLAN 30 MIN: CPT | Mod: 25

## 2020-12-23 PROCEDURE — 70450 CT HEAD/BRAIN W/O DYE: CPT | Mod: 26

## 2020-12-23 RX ORDER — ASCORBIC ACID 60 MG
500 TABLET,CHEWABLE ORAL DAILY
Refills: 0 | Status: DISCONTINUED | OUTPATIENT
Start: 2020-12-23 | End: 2020-12-28

## 2020-12-23 RX ORDER — AZITHROMYCIN 500 MG/1
500 TABLET, FILM COATED ORAL EVERY 24 HOURS
Refills: 0 | Status: DISCONTINUED | OUTPATIENT
Start: 2020-12-23 | End: 2020-12-24

## 2020-12-23 RX ORDER — CEFTRIAXONE 500 MG/1
1000 INJECTION, POWDER, FOR SOLUTION INTRAMUSCULAR; INTRAVENOUS EVERY 24 HOURS
Refills: 0 | Status: DISCONTINUED | OUTPATIENT
Start: 2020-12-23 | End: 2020-12-24

## 2020-12-23 RX ORDER — FERROUS SULFATE 325(65) MG
325 TABLET ORAL DAILY
Refills: 0 | Status: DISCONTINUED | OUTPATIENT
Start: 2020-12-23 | End: 2020-12-28

## 2020-12-23 RX ORDER — ASPIRIN/CALCIUM CARB/MAGNESIUM 324 MG
81 TABLET ORAL DAILY
Refills: 0 | Status: DISCONTINUED | OUTPATIENT
Start: 2020-12-23 | End: 2020-12-28

## 2020-12-23 RX ORDER — ENOXAPARIN SODIUM 100 MG/ML
40 INJECTION SUBCUTANEOUS DAILY
Refills: 0 | Status: DISCONTINUED | OUTPATIENT
Start: 2020-12-23 | End: 2021-01-03

## 2020-12-23 RX ORDER — FOLIC ACID 0.8 MG
1 TABLET ORAL DAILY
Refills: 0 | Status: DISCONTINUED | OUTPATIENT
Start: 2020-12-23 | End: 2020-12-28

## 2020-12-23 RX ORDER — THIAMINE MONONITRATE (VIT B1) 100 MG
100 TABLET ORAL DAILY
Refills: 0 | Status: DISCONTINUED | OUTPATIENT
Start: 2020-12-23 | End: 2020-12-28

## 2020-12-23 RX ORDER — ACETAMINOPHEN 500 MG
650 TABLET ORAL EVERY 6 HOURS
Refills: 0 | Status: DISCONTINUED | OUTPATIENT
Start: 2020-12-23 | End: 2021-01-03

## 2020-12-23 RX ORDER — SODIUM CHLORIDE 9 MG/ML
1000 INJECTION INTRAMUSCULAR; INTRAVENOUS; SUBCUTANEOUS
Refills: 0 | Status: DISCONTINUED | OUTPATIENT
Start: 2020-12-23 | End: 2020-12-24

## 2020-12-23 RX ORDER — VANCOMYCIN HCL 1 G
1000 VIAL (EA) INTRAVENOUS ONCE
Refills: 0 | Status: DISCONTINUED | OUTPATIENT
Start: 2020-12-23 | End: 2020-12-23

## 2020-12-23 RX ORDER — CEFUROXIME AXETIL 250 MG
1 TABLET ORAL
Qty: 0 | Refills: 0 | DISCHARGE

## 2020-12-23 RX ORDER — SODIUM CHLORIDE 9 MG/ML
1500 INJECTION INTRAMUSCULAR; INTRAVENOUS; SUBCUTANEOUS ONCE
Refills: 0 | Status: COMPLETED | OUTPATIENT
Start: 2020-12-23 | End: 2020-12-23

## 2020-12-23 RX ORDER — HALOPERIDOL DECANOATE 100 MG/ML
1 INJECTION INTRAMUSCULAR EVERY 6 HOURS
Refills: 0 | Status: DISCONTINUED | OUTPATIENT
Start: 2020-12-23 | End: 2021-01-03

## 2020-12-23 RX ORDER — ONDANSETRON 8 MG/1
4 TABLET, FILM COATED ORAL EVERY 6 HOURS
Refills: 0 | Status: DISCONTINUED | OUTPATIENT
Start: 2020-12-23 | End: 2021-01-03

## 2020-12-23 RX ORDER — SACCHAROMYCES BOULARDII 250 MG
250 POWDER IN PACKET (EA) ORAL
Refills: 0 | Status: DISCONTINUED | OUTPATIENT
Start: 2020-12-23 | End: 2020-12-28

## 2020-12-23 RX ORDER — PIPERACILLIN AND TAZOBACTAM 4; .5 G/20ML; G/20ML
3.38 INJECTION, POWDER, LYOPHILIZED, FOR SOLUTION INTRAVENOUS ONCE
Refills: 0 | Status: DISCONTINUED | OUTPATIENT
Start: 2020-12-23 | End: 2020-12-23

## 2020-12-23 RX ADMIN — Medication 1 TABLET(S): at 18:50

## 2020-12-23 RX ADMIN — ENOXAPARIN SODIUM 40 MILLIGRAM(S): 100 INJECTION SUBCUTANEOUS at 18:53

## 2020-12-23 RX ADMIN — AZITHROMYCIN 255 MILLIGRAM(S): 500 TABLET, FILM COATED ORAL at 17:04

## 2020-12-23 RX ADMIN — Medication 1 MILLIGRAM(S): at 18:50

## 2020-12-23 RX ADMIN — Medication 250 MILLIGRAM(S): at 18:50

## 2020-12-23 RX ADMIN — SODIUM CHLORIDE 100 MILLILITER(S): 9 INJECTION INTRAMUSCULAR; INTRAVENOUS; SUBCUTANEOUS at 13:10

## 2020-12-23 RX ADMIN — SODIUM CHLORIDE 100 MILLILITER(S): 9 INJECTION INTRAMUSCULAR; INTRAVENOUS; SUBCUTANEOUS at 18:42

## 2020-12-23 RX ADMIN — Medication 81 MILLIGRAM(S): at 18:50

## 2020-12-23 RX ADMIN — SODIUM CHLORIDE 1500 MILLILITER(S): 9 INJECTION INTRAMUSCULAR; INTRAVENOUS; SUBCUTANEOUS at 08:20

## 2020-12-23 RX ADMIN — CEFTRIAXONE 100 MILLIGRAM(S): 500 INJECTION, POWDER, FOR SOLUTION INTRAMUSCULAR; INTRAVENOUS at 15:40

## 2020-12-23 RX ADMIN — SODIUM CHLORIDE 1500 MILLILITER(S): 9 INJECTION INTRAMUSCULAR; INTRAVENOUS; SUBCUTANEOUS at 10:50

## 2020-12-23 RX ADMIN — Medication 500 MILLIGRAM(S): at 18:53

## 2020-12-23 RX ADMIN — Medication 100 MILLIGRAM(S): at 18:53

## 2020-12-23 NOTE — ED PROVIDER NOTE - CLINICAL SUMMARY MEDICAL DECISION MAKING FREE TEXT BOX
labs and diagnostic imaging results reviewed with patient and daughter; syncope vs. mechanical fall; abx and IVF given; cardiology consulted called

## 2020-12-23 NOTE — H&P ADULT - NSHPSOCIALHISTORY_GEN_ALL_CORE
Lives alone. Uses walker. Daughter visits 3 times a week.   No smoking, alcohol or illicit drug use.

## 2020-12-23 NOTE — ED ADULT TRIAGE NOTE - CHIEF COMPLAINT QUOTE
pt a+ox3, [place] BIBA from home s/p unwitnessed fall at home yesterday. pt offers no complaints at this time, denies hitting head or LOC. pt placed in critical care, MD called for eval.

## 2020-12-23 NOTE — CONSULT NOTE ADULT - ASSESSMENT
Patient is a hearing impaired 89 y/o woman with chronic HFpEF, moderate pHTN, severe TR, severe MAC, moderate AS, hypertension who was last admitted for PNA and cellulitis who presents after found down. Patient denies loss of consciousness and reports tripping on her phone cord with an inability to get off the floor. She denies chest pain and palpitations. In the ER with BNP elevation and well as mild troponin and CPK elevation.     Fall/troponin elevation elevation   - this is likely due to rhabo  - probnp elevation likely eneida to known underlying severe TR/moderate pHTN and ground glass opacities on CXR  - clinically patient is dry, would not additionally diurese at this time and would in fact gently hydrate  - possible AF, repeat EKG given noise, regardless, given recurrent fall patient is a poor candidate for systemic AC  - additionally, given her advanced age, frailty, she is poor candidate for invasive cardiac testing   - further management per primary team including PT eval    chronic HFpEF, moderate pHTN, severe TR, severe MAC, moderate AS  - repeat TTE to assess AoV, if severe AS coupled with falls and frailty, may need consideration for palliation services  Patient is a hearing impaired 89 y/o woman with chronic HFpEF, moderate pHTN, severe TR, severe MAC, moderate AS, hypertension who was last admitted for PNA and cellulitis who presents after found down. Patient denies loss of consciousness and reports tripping on her phone cord with an inability to get off the floor. She denies chest pain and palpitations. In the ER with BNP elevation and well as mild troponin and CPK elevation.     Fall/troponin elevation elevation   - this is likely due to rhabo  - probnp elevation likely eneida to known underlying severe TR/moderate pHTN and ground glass opacities on CXR  - clinically patient is dry, would not additionally diurese at this time and would in fact gently hydrate  - possible AF, repeat EKG given noise, regardless, given recurrent fall patient is a poor candidate for systemic AC  - additionally, given her advanced age, frailty, she is poor candidate for invasive cardiac testing   - further management per primary team including PT eval    chronic HFpEF, moderate pHTN, severe TR, severe MAC, moderate AS  - repeat TTE to assess AoV, if severe AS coupled with falls and frailty, may need consideration for palliation services   - fall is likely mechanical, however as afore mentioned will screen for progression of valvular heart disease

## 2020-12-23 NOTE — H&P ADULT - HISTORY OF PRESENT ILLNESS
INCOMPLETE History was taken from daughter as patient is restless/agitated at the time and is very hard of hearing.     89 years old female with PMH of Recurrent Falls, Thoracic Compression Fractures, Diastolic Heart Failure, Severe TR, LE Edema, HTN and [place] brought by EMS after she was found on the floor. As per daughter, patient lives alone and uses walker for ambulation. Daughter visits her three times a week and on Monday she was doing well. Today, when she went to see her, she was found on kitchen floor. She was alert and awake and was not complaining of anything. No obvious injury. She could not pick her up so she called EMS. As per daughter, she must have fallen between yesterday late afternoon to this morning as she had picked up mail yesterday afternoon. She was recently treated for Pneumonia and Cellulitis in Lower Extremities.   In ER, she was hypothermic, no obvious injury was found. Labs showed leukocytosis, elevated lactate / CPK / LFTs / BNP. Troponin mildly elevated. She was given NS 1500 ml. CT Head and C. Spine without any acute findings except patchy ground glass opacities in the bilateral upper lungs.

## 2020-12-23 NOTE — ED ADULT NURSE NOTE - CHIEF COMPLAINT QUOTE
pt a+ox3, [place] BIBA from home s/p unwitnessed fall at home yesterday. pt offers no complaints at this time, denies hitting head or LOC. pt placed in critical care, MD called for eval.

## 2020-12-23 NOTE — CHART NOTE - NSCHARTNOTEFT_GEN_A_CORE
s/w Daughter Arianna, pt was previously under the care of Dr. Sinclair at [facility], would like to continue care with them. Case discussed w/Dr. Ovalles who will resume care.

## 2020-12-23 NOTE — H&P ADULT - NSHPLABSRESULTS_GEN_ALL_CORE
LABS:                        14.3   18.56 )-----------( 243      ( 23 Dec 2020 08:16 )             46.0     12-23    138  |  101  |  29.0<H>  ----------------------------<  100<H>  4.5   |  20.0<L>  |  0.86    Ca    9.2      23 Dec 2020 08:16  Phos  2.8     12-23  Mg     2.1     12-23    TPro  8.5  /  Alb  3.3  /  TBili  0.7  /  DBili  x   /  AST  132<H>  /  ALT  43<H>  /  AlkPhos  137<H>  12-23    PT/INR - ( 23 Dec 2020 08:16 )   PT: 14.5 sec;   INR: 1.26 ratio       PTT - ( 23 Dec 2020 08:16 )  PTT:27.7 sec  CARDIAC MARKERS ( 23 Dec 2020 08:16 )  x     / 0.07 ng/mL / 2830 U/L / x     / 76.0 ng/mL    < from: CT Head No Cont (12.23.20 @ 09:41) >      EXAM:  CT CERVICAL SPINE                        EXAM:  CT BRAIN       CT Head No Cont (12.23.20 @ 09:41)    CT head: No acute intracranial hemorrhage or mass effect.    CT cervical spine:  1.  No evidencefor acute displaced fracture or malalignment.  2.  Patchy groundglass opacities in the bilateral upper lungs. LABS:                        14.3   18.56 )-----------( 243      ( 23 Dec 2020 08:16 )             46.0     12-23    138  |  101  |  29.0<H>  ----------------------------<  100<H>  4.5   |  20.0<L>  |  0.86    Ca    9.2      23 Dec 2020 08:16  Phos  2.8     12-23  Mg     2.1     12-23    TPro  8.5  /  Alb  3.3  /  TBili  0.7  /  DBili  x   /  AST  132<H>  /  ALT  43<H>  /  AlkPhos  137<H>  12-23    PT/INR - ( 23 Dec 2020 08:16 )   PT: 14.5 sec;   INR: 1.26 ratio       PTT - ( 23 Dec 2020 08:16 )  PTT:27.7 sec  CARDIAC MARKERS ( 23 Dec 2020 08:16 )  x     / 0.07 ng/mL / 2830 U/L / x     / 76.0 ng/mL    < from: CT Head No Cont (12.23.20 @ 09:41) >      EXAM:  CT CERVICAL SPINE                        EXAM:  CT BRAIN       CT Head No Cont (12.23.20 @ 09:41)    CT head: No acute intracranial hemorrhage or mass effect.    CT cervical spine:  1.  No evidence for acute displaced fracture or malalignment.  2.  Patchy groundglass opacities in the bilateral upper lungs.

## 2020-12-23 NOTE — ED ADULT NURSE NOTE - OBJECTIVE STATEMENT
pt a+ox3, [place] BIBA from home s/p unwitnessed fall at home yesterday. pt offers no complaints at this time, denies hitting head or LOC. pt placed in critical care, MD called for eval.

## 2020-12-23 NOTE — H&P ADULT - ASSESSMENT
INCOMPLETE 89 years old female with PMH of Recurrent Falls, Thoracic Compression Fractures, Diastolic Heart Failure, Severe TR, LE Edema, HTN and [place] brought by EMS after she was found on the floor. As per daughter, patient lives alone and uses walker for ambulation. Daughter visits her three times a week and on Monday she was doing well. Today, when she went to see her, she was found on kitchen floor. She was alert and awake and was not complaining of anything. No obvious injury. She could not pick her up so she called EMS. As per daughter, she must have fallen between yesterday late afternoon to this morning as she had picked up mail yesterday afternoon. She was recently treated for Pneumonia and Cellulitis in Lower Extremities.   In ER, she was hypothermic, no obvious injury was found. Labs showed leukocytosis, elevated lactate / CPK / LFTs / BNP. Troponin mildly elevated. She was given NS 1500 ml. CT Head and C. Spine without any acute findings except patchy ground glass opacities in the bilateral upper lungs.     1) Acute Traumatic Rhabdomyolysis   - s/p NS 1500 ml  - Encourage PO intake  - Monitor CPK    2) R/O Pneumonia  - Recently treated for Pneumonia   - GGO in upper lungs likely chronic  - She was hypothermic and has leukocytosis (which could be reactive), will empirically start Rocephin + Zithromax until cultures are back.    3) Elevated Lactate  - Likely due to hypothermia and ? hypoxia. Unlikely sepsis.  - Monitor    4) Chronic Diastolic Heart Failure  - Does not look in volume overload  - Will hold Lasix for now    5) Elevated Troponin  - Mild   - Likely due to demand ischemia  - EKG with ? A. Fib, patient was moving a lot, will repeat EKG    6) Elevated LFTs  - Likely due to rhabdomyolysis  - Monitor     7) HTN  - Not on any medications at home, will monitor.    8) Recurrent Falls  - No obvious injury  - PT Eval    GOC: DNR/I.    DVT Prophylaxis -- Lovenox 40 mg SQ    Dispo: Likely [facility] in 2-3 days.

## 2020-12-23 NOTE — H&P ADULT - CONVERSATION DETAILS
Discussed advance directive with patient's daughter - Arianna Hemphill, who is Health Care Proxy. As per her, patient does not want resuscitation in case of emergency. She has expressed her wishes in past.   As per GOC during last admission, patient was DNR/I.

## 2020-12-23 NOTE — INPATIENT CERTIFICATION FOR MEDICARE PATIENTS - NS ICMP CERT SIG IND
I certify as stated above. [None] : None [Good understanding] : Patient has a good understanding of lifestyle changes and the steps needed to achieve self management goals

## 2020-12-23 NOTE — ED PROVIDER NOTE - OBJECTIVE STATEMENT
As per EMS, patient was found at home on the ground; patient does not recall circumstances; patient's daughter states patient last known well was more than one day ago; currently patient complains of generalized weakness and pain

## 2020-12-23 NOTE — ED PROVIDER NOTE - CARE PLAN
Principal Discharge DX:	Pneumonia  Secondary Diagnosis:	Traumatic rhabdomyolysis, initial encounter  Secondary Diagnosis:	CHF (congestive heart failure)

## 2020-12-23 NOTE — H&P ADULT - NSHPPHYSICALEXAM_GEN_ALL_CORE
Vital Signs   T(C): 34.8 (23 Dec 2020 08:14), Max: 34.8 (23 Dec 2020 08:14)  T(F): 94.6 (23 Dec 2020 08:14), Max: 94.6 (23 Dec 2020 08:14)  HR: 60 (23 Dec 2020 08:14) (60 - 68)  BP: 138/63 (23 Dec 2020 08:14) (118/75 - 138/63)  RR: 20 (23 Dec 2020 08:14) (20 - 22)  SpO2: 98% (23 Dec 2020 08:14) (98% - 98%)  General: Elderly female lying in bed comfortably. No acute distress  HEENT: EOMI. Clear conjunctivae. Moist mucus membrane. Hard of hearing.   Neck: Supple.   Chest: Good air entry. No wheezing, rales or rhonchi.   Heart: Normal S1 & S2 with RRR.  Abdomen: Soft. Non-tender. Non-distended. + BS  Ext: Unna boots on both legs. No calf tenderness   Neuro: Alert and awake but very hard of hearing. Moves all four extremities. No speech disorder.  Skin: Warm and Dry  Psychiatry: Restless

## 2020-12-24 LAB
ALBUMIN SERPL ELPH-MCNC: 2.9 G/DL — LOW (ref 3.3–5.2)
ALP SERPL-CCNC: 112 U/L — SIGNIFICANT CHANGE UP (ref 40–120)
ALT FLD-CCNC: 63 U/L — HIGH
ANION GAP SERPL CALC-SCNC: 14 MMOL/L — SIGNIFICANT CHANGE UP (ref 5–17)
APPEARANCE UR: CLEAR — SIGNIFICANT CHANGE UP
AST SERPL-CCNC: 188 U/L — HIGH
BACTERIA # UR AUTO: ABNORMAL
BASOPHILS # BLD AUTO: 0.03 K/UL — SIGNIFICANT CHANGE UP (ref 0–0.2)
BASOPHILS NFR BLD AUTO: 0.3 % — SIGNIFICANT CHANGE UP (ref 0–2)
BILIRUB SERPL-MCNC: 0.3 MG/DL — LOW (ref 0.4–2)
BILIRUB UR-MCNC: NEGATIVE — SIGNIFICANT CHANGE UP
BUN SERPL-MCNC: 33 MG/DL — HIGH (ref 8–20)
CALCIUM SERPL-MCNC: 8.6 MG/DL — SIGNIFICANT CHANGE UP (ref 8.6–10.2)
CHLORIDE SERPL-SCNC: 105 MMOL/L — SIGNIFICANT CHANGE UP (ref 98–107)
CK MB CFR SERPL CALC: 43.3 NG/ML — HIGH (ref 0–6.7)
CK SERPL-CCNC: 1739 U/L — HIGH (ref 25–170)
CO2 SERPL-SCNC: 21 MMOL/L — LOW (ref 22–29)
COLOR SPEC: YELLOW — SIGNIFICANT CHANGE UP
CREAT SERPL-MCNC: 0.97 MG/DL — SIGNIFICANT CHANGE UP (ref 0.5–1.3)
DIFF PNL FLD: ABNORMAL
EOSINOPHIL # BLD AUTO: 0.04 K/UL — SIGNIFICANT CHANGE UP (ref 0–0.5)
EOSINOPHIL NFR BLD AUTO: 0.4 % — SIGNIFICANT CHANGE UP (ref 0–6)
EPI CELLS # UR: SIGNIFICANT CHANGE UP
GLUCOSE SERPL-MCNC: 71 MG/DL — SIGNIFICANT CHANGE UP (ref 70–99)
GLUCOSE UR QL: NEGATIVE MG/DL — SIGNIFICANT CHANGE UP
HCT VFR BLD CALC: 37.4 % — SIGNIFICANT CHANGE UP (ref 34.5–45)
HGB BLD-MCNC: 11.4 G/DL — LOW (ref 11.5–15.5)
HYALINE CASTS # UR AUTO: ABNORMAL /LPF
IMM GRANULOCYTES NFR BLD AUTO: 0.5 % — SIGNIFICANT CHANGE UP (ref 0–1.5)
KETONES UR-MCNC: ABNORMAL
LACTATE BLDV-MCNC: 1.5 MMOL/L — SIGNIFICANT CHANGE UP (ref 0.5–2)
LEUKOCYTE ESTERASE UR-ACNC: NEGATIVE — SIGNIFICANT CHANGE UP
LYMPHOCYTES # BLD AUTO: 1.44 K/UL — SIGNIFICANT CHANGE UP (ref 1–3.3)
LYMPHOCYTES # BLD AUTO: 13.5 % — SIGNIFICANT CHANGE UP (ref 13–44)
MAGNESIUM SERPL-MCNC: 2.1 MG/DL — SIGNIFICANT CHANGE UP (ref 1.6–2.6)
MCHC RBC-ENTMCNC: 27 PG — SIGNIFICANT CHANGE UP (ref 27–34)
MCHC RBC-ENTMCNC: 30.5 GM/DL — LOW (ref 32–36)
MCV RBC AUTO: 88.6 FL — SIGNIFICANT CHANGE UP (ref 80–100)
MONOCYTES # BLD AUTO: 1.07 K/UL — HIGH (ref 0–0.9)
MONOCYTES NFR BLD AUTO: 10 % — SIGNIFICANT CHANGE UP (ref 2–14)
NEUTROPHILS # BLD AUTO: 8.02 K/UL — HIGH (ref 1.8–7.4)
NEUTROPHILS NFR BLD AUTO: 75.3 % — SIGNIFICANT CHANGE UP (ref 43–77)
NITRITE UR-MCNC: NEGATIVE — SIGNIFICANT CHANGE UP
PH UR: 5 — SIGNIFICANT CHANGE UP (ref 5–8)
PHOSPHATE SERPL-MCNC: 3.1 MG/DL — SIGNIFICANT CHANGE UP (ref 2.4–4.7)
PLATELET # BLD AUTO: 242 K/UL — SIGNIFICANT CHANGE UP (ref 150–400)
POTASSIUM SERPL-MCNC: 4.1 MMOL/L — SIGNIFICANT CHANGE UP (ref 3.5–5.3)
POTASSIUM SERPL-SCNC: 4.1 MMOL/L — SIGNIFICANT CHANGE UP (ref 3.5–5.3)
PROT SERPL-MCNC: 7.1 G/DL — SIGNIFICANT CHANGE UP (ref 6.6–8.7)
PROT UR-MCNC: 30 MG/DL
RBC # BLD: 4.22 M/UL — SIGNIFICANT CHANGE UP (ref 3.8–5.2)
RBC # FLD: 16.8 % — HIGH (ref 10.3–14.5)
RBC CASTS # UR COMP ASSIST: SIGNIFICANT CHANGE UP /HPF (ref 0–4)
SARS-COV-2 IGG SERPL QL IA: NEGATIVE — SIGNIFICANT CHANGE UP
SARS-COV-2 IGM SERPL IA-ACNC: 0.06 INDEX — SIGNIFICANT CHANGE UP
SODIUM SERPL-SCNC: 139 MMOL/L — SIGNIFICANT CHANGE UP (ref 135–145)
SP GR SPEC: 1.02 — SIGNIFICANT CHANGE UP (ref 1.01–1.02)
UROBILINOGEN FLD QL: NEGATIVE MG/DL — SIGNIFICANT CHANGE UP
WBC # BLD: 10.65 K/UL — HIGH (ref 3.8–10.5)
WBC # FLD AUTO: 10.65 K/UL — HIGH (ref 3.8–10.5)
WBC UR QL: SIGNIFICANT CHANGE UP

## 2020-12-24 PROCEDURE — 93306 TTE W/DOPPLER COMPLETE: CPT | Mod: 26

## 2020-12-24 PROCEDURE — 99232 SBSQ HOSP IP/OBS MODERATE 35: CPT

## 2020-12-24 RX ADMIN — Medication 1 TABLET(S): at 14:54

## 2020-12-24 RX ADMIN — Medication 81 MILLIGRAM(S): at 14:53

## 2020-12-24 RX ADMIN — Medication 325 MILLIGRAM(S): at 14:53

## 2020-12-24 RX ADMIN — Medication 250 MILLIGRAM(S): at 05:14

## 2020-12-24 RX ADMIN — Medication 1 MILLIGRAM(S): at 14:53

## 2020-12-24 RX ADMIN — AZITHROMYCIN 255 MILLIGRAM(S): 500 TABLET, FILM COATED ORAL at 15:35

## 2020-12-24 RX ADMIN — Medication 500 MILLIGRAM(S): at 14:54

## 2020-12-24 RX ADMIN — SODIUM CHLORIDE 100 MILLILITER(S): 9 INJECTION INTRAMUSCULAR; INTRAVENOUS; SUBCUTANEOUS at 06:07

## 2020-12-24 RX ADMIN — ENOXAPARIN SODIUM 40 MILLIGRAM(S): 100 INJECTION SUBCUTANEOUS at 14:53

## 2020-12-24 RX ADMIN — Medication 250 MILLIGRAM(S): at 18:26

## 2020-12-24 RX ADMIN — Medication 100 MILLIGRAM(S): at 14:54

## 2020-12-24 RX ADMIN — CEFTRIAXONE 100 MILLIGRAM(S): 500 INJECTION, POWDER, FOR SOLUTION INTRAMUSCULAR; INTRAVENOUS at 14:55

## 2020-12-24 NOTE — PROGRESS NOTE ADULT - SUBJECTIVE AND OBJECTIVE BOX
[place] [facility] - [facility]                                   [address] [place] [address]                                                      PHONE: [phone]                                                         FAX: [phone]  [email or web address]  ---------------------------------------------------------------------------------------------------------------------------------    Overnight events/patient complaints:  NAD feeling ok today     No Known Allergies    MEDICATIONS  (STANDING):  ascorbic acid 500 milliGRAM(s) Oral daily  aspirin  chewable 81 milliGRAM(s) Oral daily  azithromycin  IVPB 500 milliGRAM(s) IV Intermittent every 24 hours  cefTRIAXone   IVPB 1000 milliGRAM(s) IV Intermittent every 24 hours  enoxaparin Injectable 40 milliGRAM(s) SubCutaneous daily  ferrous    sulfate 325 milliGRAM(s) Oral daily  folic acid 1 milliGRAM(s) Oral daily  multivitamin 1 Tablet(s) Oral daily  saccharomyces boulardii 250 milliGRAM(s) Oral two times a day  sodium chloride 0.9%. 1000 milliLiter(s) (100 mL/Hr) IV Continuous <Continuous>  thiamine 100 milliGRAM(s) Oral daily    MEDICATIONS  (PRN):  acetaminophen   Tablet .. 650 milliGRAM(s) Oral every 6 hours PRN Temp greater or equal to 38C (100.4F), Mild Pain (1 - 3), Moderate Pain (4 - 6)  haloperidol    Injectable 1 milliGRAM(s) IntraMuscular every 6 hours PRN Agitation  ondansetron Injectable 4 milliGRAM(s) IV Push every 6 hours PRN Nausea and/or Vomiting      Vital Signs Last 24 Hrs  T(C): 36.7 (24 Dec 2020 08:02), Max: 36.7 (24 Dec 2020 08:02)  T(F): 98 (24 Dec 2020 08:02), Max: 98 (24 Dec 2020 08:02)  HR: 70 (24 Dec 2020 08:02) (66 - 86)  BP: 97/62 (24 Dec 2020 08:02) (97/62 - 140/60)  BP(mean): 70 (24 Dec 2020 04:26) (70 - 72)  RR: 18 (24 Dec 2020 08:02) (18 - 20)  SpO2: 96% (24 Dec 2020 08:02) (92% - 99%)  ICU Vital Signs Last 24 Hrs  BRIAN SANDOVAL  I&O's Detail    I&O's Summary    Drug Dosing Weight  BRINA LORI      PHYSICAL EXAM:  General: Appears well developed, well nourished alert and cooperative.  HEENT: Head; normocephalic, atraumatic.  Eyes: Pupils reactive, cornea wnl.  Neck: Supple, no nodes adenopathy, no NVD or carotid bruit or thyromegaly.  CARDIOVASCULAR: Normal S1 and S2, 3/6 murmur, rub, gallop or lift.   LUNGS: No rales, rhonchi or wheeze. Normal breath sounds bilaterally.  ABDOMEN: Soft, nontender without mass or organomegaly. bowel sounds normoactive.  EXTREMITIES: No clubbing, cyanosis or edema. Distal pulses wnl.   SKIN: warm and dry with normal turgor.  NEURO: Alert/oriented x 1  PSYCH: normal affect.        LABS:                        11.4   10.65 )-----------( 242      ( 24 Dec 2020 09:22 )             37.4     12-24    139  |  105  |  33.0<H>  ----------------------------<  71  4.1   |  21.0<L>  |  0.97    Ca    8.6      24 Dec 2020 09:22  Phos  3.1     12-24  Mg     2.1     12-24    TPro  7.1  /  Alb  2.9<L>  /  TBili  0.3<L>  /  DBili  x   /  AST  188<H>  /  ALT  63<H>  /  AlkPhos  112  12-24    BRIAN SANDOVAL  CARDIAC MARKERS ( 24 Dec 2020 09:22 )  x     / x     / x     / x     / 43.3 ng/mL  CARDIAC MARKERS ( 23 Dec 2020 08:16 )  x     / 0.07 ng/mL / 2830 U/L / x     / 76.0 ng/mL      PT/INR - ( 23 Dec 2020 08:16 )   PT: 14.5 sec;   INR: 1.26 ratio         PTT - ( 23 Dec 2020 08:16 )  PTT:27.7 sec      RADIOLOGY & ADDITIONAL STUDIES:    INTERPRETATION OF TELEMETRY (personally reviewed):      Summary:   1. Left ventricular ejection fraction, by visual estimation, is 60 to 65%.   2. Normal global left ventricular systolic function.   3. Spectral Doppler shows impaired relaxation pattern of left ventricular myocardial filling (Grade I diastolic dysfunction).   4. Mild thickening of the anterior and posterior mitral valve leaflets.   5. Mild to moderate mitral valve regurgitation.   6. Severe mitral annular calcification.   7. Severe tricuspid regurgitation.   8. Mild to moderate aortic regurgitation.   9. Estimated pulmonary artery systolic pressure is 56.3 mmHg assuming a right atrial pressure of 3 mmHg, which is consistent with moderate pulmonary hypertension.  10. The aortic valve mean gradient is 23.4 mmHg consistent with moderate aortic stenosis.  11. The Dimesionless Index value is .30.    MD Qasim Electronically signed on 4/4/2020at 5:33:29 PM         ASSESSMENT AND PLAN:  In summary, BRIAN SANDOVAL is an 89y Female with past medical history significant for HFpEF, moderate pHTN, severe TR, severe MAC, moderate AS, hypertension who was last admitted for PNA and cellulitis who presents after found down. Patient denies loss of consciousness and reports tripping on her phone cord with an inability to get off the floor. She denies chest pain and palpitations. In the ER with BNP elevation and well as mild troponin and CPK elevation. TELE stable overnight and HD stable; + trop/CPK likely due to Rhabo       Plan  1.  Agree with Gentle IVF   2.  Awaiting repeat TTE  3.  Monitor on TELE     conservative cardiovascular management

## 2020-12-24 NOTE — PHYSICAL THERAPY INITIAL EVALUATION ADULT - CRITERIA FOR SKILLED THERAPEUTIC INTERVENTIONS
Hematological: Negative for adenopathy. Does not bruise/bleed easily. Psychiatric/Behavioral: Positive for dysphoric mood (HX. scizo affective. ). Negative for decreased concentration, sleep disturbance and suicidal ideas. The patient is not nervous/anxious. Objective:   Physical Exam   Constitutional: He is oriented to person, place, and time. He appears well-developed and well-nourished. No distress. HENT:   Head: Normocephalic and atraumatic. Right Ear: External ear normal.   Left Ear: External ear normal.   Nose: Nose normal.   Mouth/Throat: Oropharynx is clear and moist and mucous membranes are normal. No oropharyngeal exudate. Eyes: Pupils are equal, round, and reactive to light. Conjunctivae are normal. Right eye exhibits no discharge. Left eye exhibits no discharge. Neck: Normal range of motion. Neck supple. No JVD present. Cardiovascular: Normal rate and regular rhythm. No murmur heard. Pulses:       Radial pulses are 2+ on the right side, and 2+ on the left side. Posterior tibial pulses are 2+ on the right side, and 2+ on the left side. Pulmonary/Chest: Effort normal and breath sounds normal. No accessory muscle usage. No respiratory distress. He has no decreased breath sounds. He has no wheezes. Abdominal: Soft. Bowel sounds are normal. He exhibits no distension. There is no tenderness. There is no rebound, no guarding and no CVA tenderness. Hernia confirmed negative in the right inguinal area and confirmed negative in the left inguinal area. Musculoskeletal: He exhibits no edema or tenderness. Lymphadenopathy:     He has axillary adenopathy. Right axillary: Lateral adenopathy present. Left axillary: Pectoral adenopathy present. Inguinal adenopathy noted on the right side. No inguinal adenopathy noted on the left side. Right: No supraclavicular and no epitrochlear adenopathy present. Left: Inguinal adenopathy present.  No supraclavicular and no epitrochlear adenopathy present. Neurological: He is alert and oriented to person, place, and time. He displays no atrophy. He exhibits normal muscle tone. Coordination and gait normal.   Skin: Skin is warm and dry. No rash noted. Psychiatric: He has a normal mood and affect. His speech is normal and behavior is normal. Cognition and memory are normal.   Nursing note and vitals reviewed. Assessment / Plan:     1. Lymphadenopathy, generalized  - Claudio Reyes MD, Allergy & Immunology, [facility]    2. Weight loss    - Claudio Reyes MD, Allergy & Immunology, [facility]    Recommend referral to Immunology. Referral to Gastroenterology. Monitor for worsening symptoms. Call office with concerns. Return if symptoms worsen or fail to improve. Padilla Castañeda received counseling on the following healthy behaviors: nutrition, exercise and medication adherence  Reviewed prior labs and health maintenance. Continue current medications, diet and exercise. Discussed use, benefit, and side effects of prescribed medications. Barriers to medication compliance addressed. Patient given educational materials - see patient instructions. All patient questions answered. Patient voiced understanding.            Electronically signed by MEET Stein CNP on 6/9/2019 at 4:47 PM impairments found

## 2020-12-24 NOTE — PHYSICAL THERAPY INITIAL EVALUATION ADULT - ADDITIONAL COMMENTS
Pt is a poor historian, per chart, pt lives alone.  Per pt, she lives alone in a mobile home with 4 steps to enter. Reports amb with RW.

## 2020-12-24 NOTE — PROGRESS NOTE ADULT - ASSESSMENT
89 years old female with PMH of Recurrent Falls, Thoracic Compression Fractures, Diastolic Heart Failure, Severe TR, LE Edema, HTN and [place] brought by EMS after she was found on the floor. As per daughter, patient lives alone and uses walker for ambulation. Daughter visits her three times a week and on Monday she was doing well. Today, when she went to see her, she was found on kitchen floor. She was alert and awake and was not complaining of anything. No obvious injury. She could not pick her up so she called EMS. As per daughter, she must have fallen between yesterday late afternoon to this morning as she had picked up mail yesterday afternoon. She was recently treated for Pneumonia and Cellulitis in Lower Extremities.   In ER, she was hypothermic, no obvious injury was found. Labs showed leukocytosis, elevated lactate / CPK / LFTs / BNP. Troponin mildly elevated. She was given NS 1500 ml. CT Head and C. Spine without any acute findings except patchy ground glass opacities in the bilateral upper lungs.     1) Acute Traumatic Rhabdomyolysis   - s/p NS 1500 ml  - Encourage PO intake  - Monitor CPK    2) R/O Pneumonia  - Recently treated for Pneumonia   - GGO in upper lungs likely chronic  - She was hypothermic and has leukocytosis (which could be reactive), will empirically start Rocephin + Zithromax until cultures are back.    3) Elevated Lactate  - Likely due to hypothermia and ? hypoxia. Unlikely sepsis.  - Monitor    4) Chronic Diastolic Heart Failure  - Does not look in volume overload  - Will hold Lasix for now    5) Elevated Troponin  - Mild   - Likely due to demand ischemia  - EKG with ? A. Fib, patient was moving a lot, will repeat EKG    6) Elevated LFTs  - Likely due to rhabdomyolysis  - Monitor     7) HTN  - Not on any medications at home, will monitor.    8) Recurrent Falls  - No obvious injury  - PT Eval    GOC: DNR/I.    DVT Prophylaxis -- Lovenox 40 mg SQ    Dispo: Likely [facility] in 2-3 days.  89 years old female with PMH of Recurrent Falls, Thoracic Compression Fractures, Diastolic Heart Failure, Severe TR, LE Edema, HTN and [place] brought by EMS after she was found on the floor. As per daughter, patient lives alone and uses walker for ambulation. Daughter visits her three times a week and on Monday she was doing well. Today, when she went to see her, she was found on kitchen floor. She was alert and awake and was not complaining of anything. No obvious injury. She could not pick her up so she called EMS. As per daughter, she must have fallen between yesterday late afternoon to this morning as she had picked up mail yesterday afternoon. She was recently treated for Pneumonia and Cellulitis in Lower Extremities.   In ER, she was hypothermic, no obvious injury was found. Labs showed leukocytosis, elevated lactate / CPK / LFTs / BNP. Troponin mildly elevated. She was given NS 1500 ml. CT Head and C. Spine without any acute findings except patchy ground glass opacities in the bilateral upper lungs.     1) Acute Traumatic Rhabdomyolysis   - Encourage PO intake  - Monitor CPK, trending down. repeat in AM    2) confusion, likely metabolic encephalopathy in 89yo hospitalized w rhabdo  - will check UA, C+S to r/o UTI     3) Recurrent Falls  - No obvious injury  - PT Eval recommends [facility] vs 24/7 assist at home    4) R/O Pneumonia  - Recently treated for Pneumonia   - GGO in upper lungs likely chronic  - She was hypothermic and has leukocytosis (which could be reactive), WBC now down to 10.6 and afebrile, will hold on further abx at this time    5) Elevated Troponin  - Mild   - Likely due to rhabdo per cardiology  - will repeat in AM  - TTE pending  - cont cardiac monitor per cards    6) Elevated Lactate  - Likely due to hypothermia and ? hypoxia. Unlikely sepsis.  - Resolved    7) Chronic Diastolic Heart Failure  - Does not look in volume overload  - Will hold Lasix for now    8) Elevated LFTs  - Likely due to rhabdomyolysis  - Monitor, downtrending     9) HTN  - Not on any medications at home, will monitor.    GOC: DNR/I.    DVT Prophylaxis -- Lovenox 40 mg SQ    Dispo: Likely [facility] in 2-3 days.    Discussed plan with daughter, she reports she would be able to move in with patient if necessary to provide care at home.   Plan reviewed with Dr Benjamin

## 2020-12-24 NOTE — PROGRESS NOTE ADULT - SUBJECTIVE AND OBJECTIVE BOX
BRIAN SANDOVAL  89y  Female    Interval/overnight events/pt complaints:  Patient c/o feeling weak today but does want to go home. When asked where she is she states she is visiting a friend in a hospital. Today she c/o some mild SOB, but no cough, sputum production or URI sx.   Spoke with daughter who is concerned that her speech is not as clear as normal today.          ROS:  GEN: Denies fever, chills, sweats, weakness, general malaise  HEENT: Denies acute hearing or vision loss, dysphagia, sore throat  RESPIRATORY: Denies, dyspnea, orthopnea, wheeze, cough, purlent sputum or hemoptysis  CARDIAC: Denies CP, palpitations, dizziness, pedal edema  GI: Appetite good, Denies N/V, diarrhea, constipation, melena, hematochezia  : Denies difficulty urinating, dysuria, hematuria, recent menses, flank pain  NEURO: Denies headache, dizziness, numbness/tingling, weakness  MS: Denies aches/pains, loss of FROM from baseline  SKIN: Denies rash, sores/wounds, itching  ENDOCRINE: Following diabetic diet     Vital Signs Last 24 Hrs  T(C): 36.7 (24 Dec 2020 11:32), Max: 36.7 (24 Dec 2020 08:02)  T(F): 98 (24 Dec 2020 11:32), Max: 98 (24 Dec 2020 08:02)  HR: 71 (24 Dec 2020 11:32) (70 - 86)  BP: 105/62 (24 Dec 2020 11:32) (97/62 - 140/60)  BP(mean): 70 (24 Dec 2020 04:26) (70 - 72)  RR: 18 (24 Dec 2020 08:02) (18 - 20)  SpO2: 96% (24 Dec 2020 08:02) (92% - 99%)  I&O's Summary      PHYSICAL EXAM:  GENERAL: NAD, nontoxic appearing  HEENT - EOMI, pupils equal and reactive to light;  Moist mucous membranes, Good dentition, No lesions  NECK: Supple, No JVD, no adenopathy  CHEST/LUNG: Clear to auscultation bilaterally; No rales, rhonchi, wheezing  HEART: Regular rate and rhythm; No murmurs, rubs, or gallops  ABDOMEN: Soft, Nontender, Nondistended; Bowel sounds present  EXTREMITIES:  2+ Peripheral Pulses, No clubbing, cyanosis, edema, calf tenderness  NEURO:  No Focal deficits, sensory and motor intact  SKIN: No rashes or lesions, warm, dry    CAPILLARY BLOOD GLUCOSE          LABS    (12-24 @ 09:22)                      11.4  10.65 )-----------( 242                 37.4    Neutrophils = 8.02 (75.3%)  Lymphocytes = 1.44 (13.5%)  Eosinophils = 0.04 (0.4%)  Basophils = 0.03 (0.3%)  Monocytes = 1.07 (10.0%)  Bands = --%    12-24    139  |  105  |  33.0<H>  ----------------------------<  71  4.1   |  21.0<L>  |  0.97    Ca    8.6      24 Dec 2020 09:22  Phos  3.1     12-24  Mg     2.1     12-24    TPro  7.1  /  Alb  2.9<L>  /  TBili  0.3<L>  /  DBili  x   /  AST  188<H>  /  ALT  63<H>  /  AlkPhos  112  12-24    ( 23 Dec 2020 08:16 )   PT: 14.5 sec;   INR: 1.26 ratio;       PTT:27.7 sec  CARDIAC MARKERS ( 24 Dec 2020 09:22 )  Trop x     / CK 1739 U/L<H> / CKMB x       CARDIAC MARKERS ( 23 Dec 2020 08:16 )  Trop 0.07 ng/mL<H> / CK 2830 U/L<H> / CKMB x                   IMAGING    Imaging Personally Reviewed:  [ ] YES  [ ] NO    MEDICATIONS  (STANDING):  ascorbic acid 500 milliGRAM(s) Oral daily  aspirin  chewable 81 milliGRAM(s) Oral daily  azithromycin  IVPB 500 milliGRAM(s) IV Intermittent every 24 hours  cefTRIAXone   IVPB 1000 milliGRAM(s) IV Intermittent every 24 hours  enoxaparin Injectable 40 milliGRAM(s) SubCutaneous daily  ferrous    sulfate 325 milliGRAM(s) Oral daily  folic acid 1 milliGRAM(s) Oral daily  multivitamin 1 Tablet(s) Oral daily  saccharomyces boulardii 250 milliGRAM(s) Oral two times a day  sodium chloride 0.9%. 1000 milliLiter(s) (100 mL/Hr) IV Continuous <Continuous>  thiamine 100 milliGRAM(s) Oral daily    MEDICATIONS  (PRN):  acetaminophen   Tablet .. 650 milliGRAM(s) Oral every 6 hours PRN Temp greater or equal to 38C (100.4F), Mild Pain (1 - 3), Moderate Pain (4 - 6)  haloperidol    Injectable 1 milliGRAM(s) IntraMuscular every 6 hours PRN Agitation  ondansetron Injectable 4 milliGRAM(s) IV Push every 6 hours PRN Nausea and/or Vomiting    AllergiesNo Known Allergies      Consultant(s) Notes Reviewed:  [x ] YES  [ ] NO  Care Discussed with Consultants/Other Providers [ x] YES  [ ] NO       BRIAN SANDOVAL  89y  Female    Interval/overnight events/pt complaints:  Patient c/o feeling weak today but does want to go home. When asked where she is she states she is visiting a friend in a hospital. Today she c/o some mild SOB, but no cough, sputum production, hemoptysis, pleuritic CP or URI sx.   Spoke with daughter who is concerned that her speech is not as clear as normal today, worried about possible CVA. Offered reassurance that head CT did not suggest CVA, nor does remainder of exam.     ROS:  GEN: +weakness,  Denies fever, chills, sweats  HEENT: denies URI sx, [place], doesn't like current glasses  RESPIRATORY: see above  CARDIAC: Denies CP, palpitations, pedal edema  GI: Appetite good, Denies N/V, diarrhea, constipation, abd pain  : Denies difficulty urinating  NEURO: + mild dizziness, Denies headache  MS: Denies aches/pains, loss of FROM from baseline    Vital Signs Last 24 Hrs  T(C): 36.7 (24 Dec 2020 11:32), Max: 36.7 (24 Dec 2020 08:02)  T(F): 98 (24 Dec 2020 11:32), Max: 98 (24 Dec 2020 08:02)  HR: 71 (24 Dec 2020 11:32) (70 - 86)  BP: 105/62 (24 Dec 2020 11:32) (97/62 - 140/60)  BP(mean): 70 (24 Dec 2020 04:26) (70 - 72)  RR: 18 (24 Dec 2020 08:02) (18 - 20)  SpO2: 96% (24 Dec 2020 08:02) (92% - 99%)  I&O's Summary    PHYSICAL EXAM:  GENERAL: NAD, nontoxic appearing, no resp distress  HEENT - EOMI, PERRLA, Moist mucous membranes, poor dentition. Speech mild dysarthria  NECK: Supple, No JVD, no adenopathy  CHEST/LUNG: good aeration bilaterally; No rales, rhonchi, wheezing  HEART: Regular rate and rhythm  ABDOMEN: Soft, Nontender, Nondistended; Bowel sounds present  EXTREMITIES:  2+ Peripheral Pulses, No calf tenderness, both legs wrapped  NEURO:  sensory and motor grossly intact  SKIN:  warm, dry    LABS    (12-24 @ 09:22)                      11.4  10.65 )-----------( 242                 37.4    Neutrophils = 8.02 (75.3%)  Lymphocytes = 1.44 (13.5%)  Eosinophils = 0.04 (0.4%)  Basophils = 0.03 (0.3%)  Monocytes = 1.07 (10.0%)  Bands = --%    12-24    139  |  105  |  33.0<H>  ----------------------------<  71  4.1   |  21.0<L>  |  0.97    Ca    8.6      24 Dec 2020 09:22  Phos  3.1     12-24  Mg     2.1     12-24    TPro  7.1  /  Alb  2.9<L>  /  TBili  0.3<L>  /  DBili  x   /  AST  188<H>  /  ALT  63<H>  /  AlkPhos  112  12-24    ( 23 Dec 2020 08:16 )   PT: 14.5 sec;   INR: 1.26 ratio;       PTT:27.7 sec  CARDIAC MARKERS ( 24 Dec 2020 09:22 )  Trop x     / CK 1739 U/L<H> / CKMB x       CARDIAC MARKERS ( 23 Dec 2020 08:16 )  Trop 0.07 ng/mL<H> / CK 2830 U/L<H> / CKMB x           IMAGING    Imaging  Personally Reviewed:  [x ] YES  [ ] NO  no new imaging since yesterday    MEDICATIONS  (STANDING):  ascorbic acid 500 milliGRAM(s) Oral daily  aspirin  chewable 81 milliGRAM(s) Oral daily  azithromycin  IVPB 500 milliGRAM(s) IV Intermittent every 24 hours  cefTRIAXone   IVPB 1000 milliGRAM(s) IV Intermittent every 24 hours  enoxaparin Injectable 40 milliGRAM(s) SubCutaneous daily  ferrous    sulfate 325 milliGRAM(s) Oral daily  folic acid 1 milliGRAM(s) Oral daily  multivitamin 1 Tablet(s) Oral daily  saccharomyces boulardii 250 milliGRAM(s) Oral two times a day  sodium chloride 0.9%. 1000 milliLiter(s) (100 mL/Hr) IV Continuous <Continuous>  thiamine 100 milliGRAM(s) Oral daily    MEDICATIONS  (PRN):  acetaminophen   Tablet .. 650 milliGRAM(s) Oral every 6 hours PRN Temp greater or equal to 38C (100.4F), Mild Pain (1 - 3), Moderate Pain (4 - 6)  haloperidol    Injectable 1 milliGRAM(s) IntraMuscular every 6 hours PRN Agitation  ondansetron Injectable 4 milliGRAM(s) IV Push every 6 hours PRN Nausea and/or Vomiting    AllergiesNo Known Allergies      Consultant(s) Notes Reviewed:  [x ] YES  [ ] NO  Care Discussed with Consultants/Other Providers [ ] YES  [x ] NO       BRIAN SANDOVAL  89y  Female    Interval/overnight events/pt complaints:  Patient c/o feeling weak today but does want to go home. When asked where she is she states she is visiting a friend in a hospital. Today she c/o some mild SOB, but no cough, sputum production, hemoptysis, pleuritic CP or URI sx.   Spoke with daughter who is concerned that her speech is not as clear as normal today, worried about possible CVA. Offered reassurance that head CT did not suggest CVA, nor does remainder of exam.     ROS:  GEN: +weakness,  Denies fever, chills, sweats  HEENT: denies URI sx, [place], doesn't like current glasses  RESPIRATORY: see above  CARDIAC: Denies CP, palpitations, pedal edema  GI: Appetite good, Denies N/V, diarrhea, constipation, abd pain  : Denies difficulty urinating  NEURO: + mild dizziness, Denies headache  MS: Denies aches/pains, loss of FROM from baseline    Vital Signs Last 24 Hrs  T(C): 36.7 (24 Dec 2020 11:32), Max: 36.7 (24 Dec 2020 08:02)  T(F): 98 (24 Dec 2020 11:32), Max: 98 (24 Dec 2020 08:02)  HR: 71 (24 Dec 2020 11:32) (70 - 86)  BP: 105/62 (24 Dec 2020 11:32) (97/62 - 140/60)  BP(mean): 70 (24 Dec 2020 04:26) (70 - 72)  RR: 18 (24 Dec 2020 08:02) (18 - 20)  SpO2: 96% (24 Dec 2020 08:02) (92% - 99%)  I&O's Summary    PHYSICAL EXAM:  GENERAL: NAD, nontoxic appearing, no resp distress  HEENT - EOMI, PERRLA, Moist mucous membranes, poor dentition. Speech mild dysarthria  NECK: Supple, No JVD, no adenopathy  CHEST/LUNG: good aeration bilaterally; No rales, rhonchi, wheezing  HEART: Regular rate and rhythm  ABDOMEN: Soft, Nontender, Nondistended; Bowel sounds present  EXTREMITIES:  2+ Peripheral Pulses, No calf tenderness, both legs wrapped  NEURO:  sensory and motor grossly intact  SKIN:  warm, dry    LABS    (12-24 @ 09:22)                      11.4  10.65 )-----------( 242                 37.4    Neutrophils = 8.02 (75.3%)  Lymphocytes = 1.44 (13.5%)  Eosinophils = 0.04 (0.4%)  Basophils = 0.03 (0.3%)  Monocytes = 1.07 (10.0%)  Bands = --%    12-24    139  |  105  |  33.0<H>  ----------------------------<  71  4.1   |  21.0<L>  |  0.97    Ca    8.6      24 Dec 2020 09:22  Phos  3.1     12-24  Mg     2.1     12-24    TPro  7.1  /  Alb  2.9<L>  /  TBili  0.3<L>  /  DBili  x   /  AST  188<H>  /  ALT  63<H>  /  AlkPhos  112  12-24    ( 23 Dec 2020 08:16 )   PT: 14.5 sec;   INR: 1.26 ratio;  PTT:27.7 sec    CARDIAC MARKERS ( 24 Dec 2020 09:22 )  Trop x     / CK 1739 U/L<H> / CKMB x       CARDIAC MARKERS ( 23 Dec 2020 08:16 )  Trop 0.07 ng/mL<H> / CK 2830 U/L<H> / CKMB x           IMAGING    Imaging  Personally Reviewed:  [x ] YES  [ ] NO  no new imaging since yesterday    MEDICATIONS  (STANDING):  ascorbic acid 500 milliGRAM(s) Oral daily  aspirin  chewable 81 milliGRAM(s) Oral daily  azithromycin  IVPB 500 milliGRAM(s) IV Intermittent every 24 hours  cefTRIAXone   IVPB 1000 milliGRAM(s) IV Intermittent every 24 hours  enoxaparin Injectable 40 milliGRAM(s) SubCutaneous daily  ferrous    sulfate 325 milliGRAM(s) Oral daily  folic acid 1 milliGRAM(s) Oral daily  multivitamin 1 Tablet(s) Oral daily  saccharomyces boulardii 250 milliGRAM(s) Oral two times a day  sodium chloride 0.9%. 1000 milliLiter(s) (100 mL/Hr) IV Continuous <Continuous>  thiamine 100 milliGRAM(s) Oral daily    MEDICATIONS  (PRN):  acetaminophen   Tablet .. 650 milliGRAM(s) Oral every 6 hours PRN Temp greater or equal to 38C (100.4F), Mild Pain (1 - 3), Moderate Pain (4 - 6)  haloperidol    Injectable 1 milliGRAM(s) IntraMuscular every 6 hours PRN Agitation  ondansetron Injectable 4 milliGRAM(s) IV Push every 6 hours PRN Nausea and/or Vomiting    AllergiesNo Known Allergies      Consultant(s) Notes Reviewed:  [x ] YES  [ ] NO  Care Discussed with Consultants/Other Providers [ ] YES  [x ] NO       BRIAN SANDOVAL  89y  Female    Interval/overnight events/pt complaints:  Patient c/o feeling weak today but does want to go home. When asked where she is she states she is visiting a friend in a hospital. Today she c/o some mild SOB, but no cough, sputum production, hemoptysis, pleuritic CP or URI sx.   Spoke with daughter who is concerned that her speech is not as clear as normal today, worried about possible CVA. Offered reassurance that head CT did not suggest CVA, nor does remainder of exam.     ROS:  GEN: +weakness,  Denies fever, chills, sweats  HEENT: denies URI sx, [place], doesn't like current glasses  RESPIRATORY: see above  CARDIAC: Denies CP, palpitations, pedal edema  GI: Appetite good, Denies N/V, diarrhea, constipation, abd pain  : Denies difficulty urinating  NEURO: + mild dizziness, Denies headache  MS: Denies aches/pains, loss of FROM from baseline    Vital Signs Last 24 Hrs  T(C): 36.7 (24 Dec 2020 11:32), Max: 36.7 (24 Dec 2020 08:02)  T(F): 98 (24 Dec 2020 11:32), Max: 98 (24 Dec 2020 08:02)  HR: 71 (24 Dec 2020 11:32) (70 - 86)  BP: 105/62 (24 Dec 2020 11:32) (97/62 - 140/60)  BP(mean): 70 (24 Dec 2020 04:26) (70 - 72)  RR: 18 (24 Dec 2020 08:02) (18 - 20)  SpO2: 96% (24 Dec 2020 08:02) (92% - 99%)  I&O's Summary    PHYSICAL EXAM:  GENERAL: NAD, nontoxic appearing, no resp distress  HEENT - EOMI, PERRLA, Moist mucous membranes, poor dentition. Speech mild dysarthria  NECK: Supple, No JVD, no adenopathy  CHEST/LUNG: good aeration bilaterally; No rales, rhonchi, wheezing  HEART: Regular rate and rhythm  ABDOMEN: Soft, Nontender, Nondistended; Bowel sounds present  EXTREMITIES:  2+ Peripheral Pulses, No calf tenderness, both legs wrapped  NEURO:  sensory and motor grossly intact  SKIN:  warm, dry    LABS    (12-24 @ 09:22)                      11.4  10.65 )-----------( 242                 37.4    Neutrophils = 8.02 (75.3%)  Lymphocytes = 1.44 (13.5%)  Eosinophils = 0.04 (0.4%)  Basophils = 0.03 (0.3%)  Monocytes = 1.07 (10.0%)  Bands = --%    12-24    139  |  105  |  33.0<H>  ----------------------------<  71  4.1   |  21.0<L>  |  0.97    Ca    8.6      24 Dec 2020 09:22  Phos  3.1     12-24  Mg     2.1     12-24    TPro  7.1  /  Alb  2.9<L>  /  TBili  0.3<L>  /  DBili  x   /  AST  188<H>  /  ALT  63<H>  /  AlkPhos  112  12-24    ( 23 Dec 2020 08:16 )   PT: 14.5 sec;   INR: 1.26 ratio;  PTT:27.7 sec    CARDIAC MARKERS ( 24 Dec 2020 09:22 )  Trop x     / CK 1739 U/L<H> / CKMB x       CARDIAC MARKERS ( 23 Dec 2020 08:16 )  Trop 0.07 ng/mL<H> / CK 2830 U/L<H> / CKMB x           - CK trend:                   <--1739, <--2830    IMAGING    Imaging  Personally Reviewed:  [x ] YES  [ ] NO  no new imaging since yesterday    MEDICATIONS  (STANDING):  ascorbic acid 500 milliGRAM(s) Oral daily  aspirin  chewable 81 milliGRAM(s) Oral daily  azithromycin  IVPB 500 milliGRAM(s) IV Intermittent every 24 hours  cefTRIAXone   IVPB 1000 milliGRAM(s) IV Intermittent every 24 hours  enoxaparin Injectable 40 milliGRAM(s) SubCutaneous daily  ferrous    sulfate 325 milliGRAM(s) Oral daily  folic acid 1 milliGRAM(s) Oral daily  multivitamin 1 Tablet(s) Oral daily  saccharomyces boulardii 250 milliGRAM(s) Oral two times a day  sodium chloride 0.9%. 1000 milliLiter(s) (100 mL/Hr) IV Continuous <Continuous>  thiamine 100 milliGRAM(s) Oral daily    MEDICATIONS  (PRN):  acetaminophen   Tablet .. 650 milliGRAM(s) Oral every 6 hours PRN Temp greater or equal to 38C (100.4F), Mild Pain (1 - 3), Moderate Pain (4 - 6)  haloperidol    Injectable 1 milliGRAM(s) IntraMuscular every 6 hours PRN Agitation  ondansetron Injectable 4 milliGRAM(s) IV Push every 6 hours PRN Nausea and/or Vomiting    AllergiesNo Known Allergies      Consultant(s) Notes Reviewed:  [x ] YES  [ ] NO  Care Discussed with Consultants/Other Providers [ ] YES  [x ] NO

## 2020-12-24 NOTE — PATIENT PROFILE ADULT - FALL HARM RISK
Private Vehicle
age(85 years old or older)/bones(Osteoporosis,prev fx,steroid use,metastatic bone ca)/coagulation(Bleeding disorder R/T clinical cond/anti-coags)

## 2020-12-25 LAB
-  STREPTOCOCCUS SP. (NOT GRP A, B OR S PNEUMONIAE): SIGNIFICANT CHANGE UP
ALBUMIN SERPL ELPH-MCNC: 2.6 G/DL — LOW (ref 3.3–5.2)
ALP SERPL-CCNC: 93 U/L — SIGNIFICANT CHANGE UP (ref 40–120)
ALT FLD-CCNC: 57 U/L — HIGH
ANION GAP SERPL CALC-SCNC: 10 MMOL/L — SIGNIFICANT CHANGE UP (ref 5–17)
AST SERPL-CCNC: 127 U/L — HIGH
BASOPHILS # BLD AUTO: 0.04 K/UL — SIGNIFICANT CHANGE UP (ref 0–0.2)
BASOPHILS NFR BLD AUTO: 0.7 % — SIGNIFICANT CHANGE UP (ref 0–2)
BILIRUB DIRECT SERPL-MCNC: 0.1 MG/DL — SIGNIFICANT CHANGE UP (ref 0–0.3)
BILIRUB INDIRECT FLD-MCNC: 0.1 MG/DL — LOW (ref 0.2–1)
BILIRUB SERPL-MCNC: 0.2 MG/DL — LOW (ref 0.4–2)
BUN SERPL-MCNC: 35 MG/DL — HIGH (ref 8–20)
CALCIUM SERPL-MCNC: 8.3 MG/DL — LOW (ref 8.6–10.2)
CHLORIDE SERPL-SCNC: 108 MMOL/L — HIGH (ref 98–107)
CK MB CFR SERPL CALC: 14.8 NG/ML — HIGH (ref 0–6.7)
CK SERPL-CCNC: 538 U/L — HIGH (ref 25–170)
CO2 SERPL-SCNC: 23 MMOL/L — SIGNIFICANT CHANGE UP (ref 22–29)
CREAT SERPL-MCNC: 0.83 MG/DL — SIGNIFICANT CHANGE UP (ref 0.5–1.3)
EOSINOPHIL # BLD AUTO: 0.17 K/UL — SIGNIFICANT CHANGE UP (ref 0–0.5)
EOSINOPHIL NFR BLD AUTO: 2.9 % — SIGNIFICANT CHANGE UP (ref 0–6)
GLUCOSE SERPL-MCNC: 85 MG/DL — SIGNIFICANT CHANGE UP (ref 70–99)
GRAM STN FLD: SIGNIFICANT CHANGE UP
HCT VFR BLD CALC: 34.3 % — LOW (ref 34.5–45)
HGB BLD-MCNC: 10.4 G/DL — LOW (ref 11.5–15.5)
IMM GRANULOCYTES NFR BLD AUTO: 0.5 % — SIGNIFICANT CHANGE UP (ref 0–1.5)
LYMPHOCYTES # BLD AUTO: 1.3 K/UL — SIGNIFICANT CHANGE UP (ref 1–3.3)
LYMPHOCYTES # BLD AUTO: 22.3 % — SIGNIFICANT CHANGE UP (ref 13–44)
MCHC RBC-ENTMCNC: 27.1 PG — SIGNIFICANT CHANGE UP (ref 27–34)
MCHC RBC-ENTMCNC: 30.3 GM/DL — LOW (ref 32–36)
MCV RBC AUTO: 89.3 FL — SIGNIFICANT CHANGE UP (ref 80–100)
METHOD TYPE: SIGNIFICANT CHANGE UP
MONOCYTES # BLD AUTO: 0.76 K/UL — SIGNIFICANT CHANGE UP (ref 0–0.9)
MONOCYTES NFR BLD AUTO: 13 % — SIGNIFICANT CHANGE UP (ref 2–14)
NEUTROPHILS # BLD AUTO: 3.54 K/UL — SIGNIFICANT CHANGE UP (ref 1.8–7.4)
NEUTROPHILS NFR BLD AUTO: 60.6 % — SIGNIFICANT CHANGE UP (ref 43–77)
ORGANISM # SPEC MICROSCOPIC CNT: SIGNIFICANT CHANGE UP
PLATELET # BLD AUTO: 207 K/UL — SIGNIFICANT CHANGE UP (ref 150–400)
POTASSIUM SERPL-MCNC: 4 MMOL/L — SIGNIFICANT CHANGE UP (ref 3.5–5.3)
POTASSIUM SERPL-SCNC: 4 MMOL/L — SIGNIFICANT CHANGE UP (ref 3.5–5.3)
PROT SERPL-MCNC: 6.4 G/DL — LOW (ref 6.6–8.7)
RBC # BLD: 3.84 M/UL — SIGNIFICANT CHANGE UP (ref 3.8–5.2)
RBC # FLD: 16.6 % — HIGH (ref 10.3–14.5)
SODIUM SERPL-SCNC: 141 MMOL/L — SIGNIFICANT CHANGE UP (ref 135–145)
SPECIMEN SOURCE: SIGNIFICANT CHANGE UP
TROPONIN T SERPL-MCNC: 0.08 NG/ML — HIGH (ref 0–0.06)
WBC # BLD: 5.84 K/UL — SIGNIFICANT CHANGE UP (ref 3.8–10.5)
WBC # FLD AUTO: 5.84 K/UL — SIGNIFICANT CHANGE UP (ref 3.8–10.5)

## 2020-12-25 PROCEDURE — 93970 EXTREMITY STUDY: CPT | Mod: 26

## 2020-12-25 PROCEDURE — 93010 ELECTROCARDIOGRAM REPORT: CPT

## 2020-12-25 PROCEDURE — 99233 SBSQ HOSP IP/OBS HIGH 50: CPT

## 2020-12-25 PROCEDURE — 99223 1ST HOSP IP/OBS HIGH 75: CPT

## 2020-12-25 RX ORDER — DILTIAZEM HCL 120 MG
30 CAPSULE, EXT RELEASE 24 HR ORAL
Refills: 0 | Status: DISCONTINUED | OUTPATIENT
Start: 2020-12-25 | End: 2020-12-28

## 2020-12-25 RX ORDER — CEFTRIAXONE 500 MG/1
2000 INJECTION, POWDER, FOR SOLUTION INTRAMUSCULAR; INTRAVENOUS EVERY 24 HOURS
Refills: 0 | Status: COMPLETED | OUTPATIENT
Start: 2020-12-25 | End: 2021-01-01

## 2020-12-25 RX ORDER — CEFAZOLIN SODIUM 1 G
1000 VIAL (EA) INJECTION ONCE
Refills: 0 | Status: COMPLETED | OUTPATIENT
Start: 2020-12-25 | End: 2020-12-25

## 2020-12-25 RX ORDER — CEFAZOLIN SODIUM 1 G
VIAL (EA) INJECTION
Refills: 0 | Status: DISCONTINUED | OUTPATIENT
Start: 2020-12-25 | End: 2020-12-25

## 2020-12-25 RX ORDER — CEFAZOLIN SODIUM 1 G
1000 VIAL (EA) INJECTION EVERY 8 HOURS
Refills: 0 | Status: DISCONTINUED | OUTPATIENT
Start: 2020-12-25 | End: 2020-12-25

## 2020-12-25 RX ORDER — DILTIAZEM HCL 120 MG
5 CAPSULE, EXT RELEASE 24 HR ORAL EVERY 4 HOURS
Refills: 0 | Status: DISCONTINUED | OUTPATIENT
Start: 2020-12-25 | End: 2021-01-03

## 2020-12-25 RX ORDER — DILTIAZEM HCL 120 MG
10 CAPSULE, EXT RELEASE 24 HR ORAL ONCE
Refills: 0 | Status: COMPLETED | OUTPATIENT
Start: 2020-12-25 | End: 2020-12-25

## 2020-12-25 RX ORDER — SODIUM CHLORIDE 9 MG/ML
500 INJECTION INTRAMUSCULAR; INTRAVENOUS; SUBCUTANEOUS ONCE
Refills: 0 | Status: COMPLETED | OUTPATIENT
Start: 2020-12-25 | End: 2020-12-25

## 2020-12-25 RX ADMIN — Medication 1 TABLET(S): at 12:37

## 2020-12-25 RX ADMIN — CEFTRIAXONE 100 MILLIGRAM(S): 500 INJECTION, POWDER, FOR SOLUTION INTRAMUSCULAR; INTRAVENOUS at 15:19

## 2020-12-25 RX ADMIN — Medication 1 MILLIGRAM(S): at 12:42

## 2020-12-25 RX ADMIN — Medication 500 MILLIGRAM(S): at 12:37

## 2020-12-25 RX ADMIN — Medication 10 MILLIGRAM(S): at 01:30

## 2020-12-25 RX ADMIN — Medication 81 MILLIGRAM(S): at 12:41

## 2020-12-25 RX ADMIN — Medication 30 MILLIGRAM(S): at 18:33

## 2020-12-25 RX ADMIN — Medication 250 MILLIGRAM(S): at 18:32

## 2020-12-25 RX ADMIN — ENOXAPARIN SODIUM 40 MILLIGRAM(S): 100 INJECTION SUBCUTANEOUS at 12:38

## 2020-12-25 RX ADMIN — SODIUM CHLORIDE 500 MILLILITER(S): 9 INJECTION INTRAMUSCULAR; INTRAVENOUS; SUBCUTANEOUS at 02:00

## 2020-12-25 RX ADMIN — Medication 30 MILLIGRAM(S): at 12:37

## 2020-12-25 RX ADMIN — Medication 325 MILLIGRAM(S): at 12:37

## 2020-12-25 RX ADMIN — Medication 30 MILLIGRAM(S): at 23:37

## 2020-12-25 RX ADMIN — Medication 250 MILLIGRAM(S): at 04:55

## 2020-12-25 RX ADMIN — Medication 100 MILLIGRAM(S): at 10:05

## 2020-12-25 RX ADMIN — Medication 100 MILLIGRAM(S): at 12:37

## 2020-12-25 NOTE — CONSULT NOTE ADULT - ASSESSMENT
89y  Female with h/o Recurrent Falls, Thoracic Compression Fractures, Diastolic Heart Failure, Severe TR, LE Edema, HTN. Patient was brought by EMS after she was found on the floor. Patient lives alone and uses walker for ambulation. Daughter visits her three times a week and on 12/21 she was doing well. 12/23, when she went to see her, she was found on kitchen floor. She was alert and awake and was not complaining of anything. No obvious injury. She could not pick her up so she called EMS. She was recently treated for Pneumonia and Cellulitis in Lower Extremities. In ER, she was hypothermic, no obvious injury was found. Labs showed leukocytosis, elevated lactate / CPK / LFTs / BNP. Troponin mildly elevated. She was given NS 1500 ml. CT Head and C. Spine without any acute findings except patchy ground glass opacities in the bilateral upper lungs. Started on IV Cefazolin got LLE cellulitis.      Streptococcal septicemia   LLE cellulitis       - Blood cultures reporting streptococcus sp   - Repeat blood cultures ordered  - COVID 19 PCR Negative   - TTE reviewed   - D/C Cefazolin  - Start Ceftriaxone 2gm IV q 24hours  - Follow up cultures   - Trend Fever  - Trend Leukocytosis      Will Follow

## 2020-12-25 NOTE — PROGRESS NOTE ADULT - SUBJECTIVE AND OBJECTIVE BOX
seen for confusion    rapid afib overnight, intermittently  ros unable to obtain due to confusion.    MEDICATIONS  (STANDING):  ascorbic acid 500 milliGRAM(s) Oral daily  aspirin  chewable 81 milliGRAM(s) Oral daily  ceFAZolin   IVPB      ceFAZolin   IVPB 1000 milliGRAM(s) IV Intermittent every 8 hours  diltiazem    Tablet 30 milliGRAM(s) Oral four times a day  enoxaparin Injectable 40 milliGRAM(s) SubCutaneous daily  ferrous    sulfate 325 milliGRAM(s) Oral daily  folic acid 1 milliGRAM(s) Oral daily  multivitamin 1 Tablet(s) Oral daily  saccharomyces boulardii 250 milliGRAM(s) Oral two times a day  thiamine 100 milliGRAM(s) Oral daily    MEDICATIONS  (PRN):  acetaminophen   Tablet .. 650 milliGRAM(s) Oral every 6 hours PRN Temp greater or equal to 38C (100.4F), Mild Pain (1 - 3), Moderate Pain (4 - 6)  diltiazem Injectable 5 milliGRAM(s) IV Push every 4 hours PRN HR>130 sustained  haloperidol    Injectable 1 milliGRAM(s) IntraMuscular every 6 hours PRN Agitation  ondansetron Injectable 4 milliGRAM(s) IV Push every 6 hours PRN Nausea and/or Vomiting      Allergies    No Known Allergies    Vital Signs Last 24 Hrs  T(C): 36.4 (25 Dec 2020 07:57), Max: 36.7 (24 Dec 2020 11:32)  T(F): 97.6 (25 Dec 2020 07:57), Max: 98.1 (25 Dec 2020 04:00)  HR: 137 (25 Dec 2020 07:57) (69 - 137)  BP: 92/61 (25 Dec 2020 07:57) (88/50 - 116/64)  BP(mean): --  RR: 20 (25 Dec 2020 07:57) (19 - 22)  SpO2: 98% (25 Dec 2020 07:57) (98% - 99%)    PHYSICAL EXAM:    GENERAL: NAD  CHEST/LUNG: Clear to percussion bilaterally  HEART: irreg irreg rate and rhythm;  ABDOMEN: Soft, Nondistended; Bowel sounds present    stage 1 sacrum,  stage 2 thoracic spine  EXTREMITIES:  LLE with smaller skin ulcerations on lateral lower leg.  erythema and swelling noted.  NERVOUS SYSTEM:  Alert & Oriented 1 crying during evaluation. nonfocal neuro otherwise   LABS:                        10.4   5.84  )-----------( 207      ( 25 Dec 2020 09:15 )             34.3     12-    141  |  108<H>  |  35.0<H>  ----------------------------<  85  4.0   |  23.0  |  0.83    Ca    8.3<L>      25 Dec 2020 09:15  Phos  3.1     12-24  Mg     2.1     12-24    TPro  6.4<L>  /  Alb  2.6<L>  /  TBili  0.2<L>  /  DBili  0.1  /  AST  127<H>  /  ALT  57<H>  /  AlkPhos  93  12      Urinalysis Basic - ( 24 Dec 2020 19:42 )    Color: Yellow / Appearance: Clear / S.025 / pH: x  Gluc: x / Ketone: Trace  / Bili: Negative / Urobili: Negative mg/dL   Blood: x / Protein: 30 mg/dL / Nitrite: Negative   Leuk Esterase: Negative / RBC: 0-2 /HPF / WBC 0-2   Sq Epi: x / Non Sq Epi: Occasional / Bacteria: Few        CAPILLARY BLOOD GLUCOSE            RADIOLOGY & ADDITIONAL TESTS:

## 2020-12-25 NOTE — PROGRESS NOTE ADULT - ASSESSMENT
89 years old female with PMH of Recurrent Falls, Thoracic Compression Fractures, Diastolic Heart Failure, Severe TR, LE Edema, HTN and [place] brought by EMS after she was found on the floor. As per daughter, patient lives alone and uses walker for ambulation. Daughter visits her three times a week and on Monday she was doing well. Today, when she went to see her, she was found on kitchen floor. She was alert and awake and was not complaining of anything. No obvious injury. She could not pick her up so she called EMS. As per daughter, she must have fallen between yesterday late afternoon to this morning as she had picked up mail yesterday afternoon. She was recently treated for Pneumonia and Cellulitis in Lower Extremities.   In ER, she was hypothermic, no obvious injury was found. Labs showed leukocytosis, elevated lactate / CPK / LFTs / BNP. Troponin mildly elevated. She was given NS 1500 ml. CT Head and C. Spine without any acute findings except patchy ground glass opacities in the bilateral upper lungs.     bacteremia, likely LLE cellulitis     ID consulted    abx, repeat blood cultures for am    acute metabolic encephalopathy: likely due to infection    monitor, avoid sedatives    CT head negative for acute pathology    rhabdo: resolved    AFib with RVR: echo pending    low dose diltiazem. cardiology following.      Recurrent Falls  - PT Eval recommends JOCELYNN vs 24/7 assist at home    elevated Troponin    likely demand    Chronic Diastolic Heart Failure  - Does not look in volume overload  - Will hold Lasix for now      GOC: DNR/I.    DVT Prophylaxis -- Lovenox 40 mg SQ    Dispo: once cultures negative    d/w daughter over the phone.

## 2020-12-25 NOTE — PROGRESS NOTE ADULT - SUBJECTIVE AND OBJECTIVE BOX
[facility]                              [address] [address]                                                 PHONE: [phone]                                                 FAX: [phone]  -----------------------------------------------------------------------------------------------  Pt seen and examined. FU for  shortness of breath     Overnight events/Complaints: Pt confused. Appears lethargic.     Vital Signs Last 24 Hrs  T(C): 36.4 (25 Dec 2020 07:57), Max: 36.7 (24 Dec 2020 11:32)  T(F): 97.6 (25 Dec 2020 07:57), Max: 98.1 (25 Dec 2020 04:00)  HR: 137 (25 Dec 2020 07:57) (69 - 137)  BP: 92/61 (25 Dec 2020 07:57) (88/50 - 116/64)  BP(mean): --  RR: 20 (25 Dec 2020 07:57) (19 - 22)  SpO2: 98% (25 Dec 2020 07:57) (98% - 99%)  I&O's Summary    24 Dec 2020 07:01  -  25 Dec 2020 07:00  --------------------------------------------------------  IN: 500 mL / OUT: 0 mL / NET: 500 mL      PHYSICAL EXAM:  General: Appears well developed, well nourished alert and cooperative.  HEENT: Head; normocephalic, atraumatic.  Eyes: Pupils reactive, cornea wnl.  Neck: Supple, no nodes adenopathy, no NVD or carotid bruit or thyromegaly.  CARDIOVASCULAR: Normal S1 and S2, 3/6 murmur, rub, gallop or lift.   LUNGS: No rales, rhonchi or wheeze. Normal breath sounds bilaterally.  ABDOMEN: Soft, nontender without mass or organomegaly. bowel sounds normoactive.  EXTREMITIES: No clubbing, cyanosis or edema. Distal pulses wnl.   SKIN: warm and dry with normal turgor.  NEURO: Alert/oriented x 1  PSYCH: flat  affect.        LABS:                                   10.4   5.84  )-----------( 207      ( 25 Dec 2020 09:15 )             34.3     12-25    141  |  108<H>  |  35.0<H>  ----------------------------<  85  4.0   |  23.0  |  0.83    Ca    8.3<L>      25 Dec 2020 09:15  Phos  3.1     12-24  Mg     2.1     12-24    TPro  6.4<L>  /  Alb  2.6<L>  /  TBili  0.2<L>  /  DBili  0.1  /  AST  127<H>  /  ALT  57<H>  /  AlkPhos  93  12-25    CARDIAC MARKERS ( 25 Dec 2020 09:15 )  x     / 0.08 ng/mL / 538 U/L / x     / 14.8 ng/mL  CARDIAC MARKERS ( 24 Dec 2020 09:22 )  x     / x     / 1739 U/L / x     / 43.3 ng/mL          RADIOLOGY & ADDITIONAL STUDIES: (reviewed)  CXR was independently visualized/reviewed  and demonstrated: cardiomegaly    CARDIOLOGY TESTING:(reviewed)     12 lead EKG independently visualized/reviewed  and demonstrated    ECHOCARDIOGRAM independently visualized/reviewed and demonstrated :    1. Left ventricular ejection fraction, by visual estimation, is 60 to 65%.   2. Normal global left ventricular systolic function.   3. Spectral Doppler shows impaired relaxation pattern of left ventricular myocardial filling (Grade I diastolic dysfunction).   4. Mild thickening of the anterior and posterior mitral valve leaflets.   5. Mild to moderate mitral valve regurgitation.   6. Severe mitral annular calcification.   7. Severe tricuspid regurgitation.   8. Mild to moderate aortic regurgitation.   9. Estimated pulmonary artery systolic pressure is 56.3 mmHg assuming a right atrial pressure of 3 mmHg, which is consistent with moderate pulmonary hypertension.  10. The aortic valve mean gradient is 23.4 mmHg consistent with moderate aortic stenosis.  11. The Dimesionless Index value is .30.      MEDICATIONS:(reviewed)  MEDICATIONS  (STANDING):  ascorbic acid 500 milliGRAM(s) Oral daily  aspirin  chewable 81 milliGRAM(s) Oral daily  ceFAZolin   IVPB      ceFAZolin   IVPB 1000 milliGRAM(s) IV Intermittent every 8 hours  diltiazem    Tablet 30 milliGRAM(s) Oral four times a day  enoxaparin Injectable 40 milliGRAM(s) SubCutaneous daily  ferrous    sulfate 325 milliGRAM(s) Oral daily  folic acid 1 milliGRAM(s) Oral daily  multivitamin 1 Tablet(s) Oral daily  saccharomyces boulardii 250 milliGRAM(s) Oral two times a day  thiamine 100 milliGRAM(s) Oral daily

## 2020-12-25 NOTE — CHART NOTE - NSCHARTNOTEFT_GEN_A_CORE
RN notified PA of pt with tachycardia     Patient does not have history of A fib but have other cardiac history. Cardiology saw patient in ED stating possible AF although a lot of artifact on initial EKG. Cardio stating pt is a poor candidate for systemic AC due to fall and  A&O status, high risk of bleed.   Patient seen and examined at bedside. Pt lying comfortably in NAD. Pt offering no acute complaints, unable to obtain full ROS due to mental status     VS: HR: 120-130s BP: 116/64 RR 22 SP02: 99 on 2L NC    PHYSICAL EXAM:  GENERAL: NAD, sleeping in bed  HEENT - EOMI, PERRLA, Moist mucous membranes, poor dentition. Speech mild dysarthria  CHEST/LUNG: Moderate air entry bilaterally, non-labored resp   HEART: Irregular rate and rhythm  ABDOMEN: Soft, Nontender, Nondistended; Bowel sounds present  EXTREMITIES:  2+ Peripheral Pulses, No calf tenderness, both legs wrapped  NEURO: A&0x1   SKIN:  warm, dry    PLAN:   - STAT EKG showing A fib (HR in 120s), EKG without acute ST or T wave abnormalities  - s/p 10mg Cardizem IV x1 HR improved to ......  - Am bmp, mag, phos RN notified PA of pt with tachycardia     Patient does not have history of A fib but have other cardiac history. Cardiology saw patient in ED stating possible AF although a lot of artifact on initial EKG. Cardio stating pt is a poor candidate for systemic AC due to fall and  A&O status, high risk of bleed.   Patient seen and examined at bedside. Pt lying comfortably in NAD. Pt offering no acute complaints, unable to obtain full ROS due to mental status     VS: HR: 120-130s BP: 116/64 RR 22 SP02: 99 on 2L NC    PHYSICAL EXAM:  GENERAL: NAD, sleeping in bed  HEENT - EOMI, PERRLA, Moist mucous membranes, poor dentition. Speech mild dysarthria  CHEST/LUNG: Moderate air entry bilaterally, non-labored resp   HEART: Irregular rate and rhythm  ABDOMEN: Soft, Nontender, Nondistended; Bowel sounds present  EXTREMITIES:  2+ Peripheral Pulses, No calf tenderness, both legs wrapped  NEURO: A&0x1   SKIN:  warm, dry    PLAN:   - STAT EKG showing A fib (HR in 120s), EKG without acute ST or T wave abnormalities  - s/p 10mg Cardizem IV x1 HR improved to 70-80s  - 500cc bolus x 1 given for BP of 93/52 following Cardizem  - BP stable following bolus    - Am bmp, mag, phos RN notified PA of pt with tachycardia     Patient does not have history of A fib but have other cardiac history. Cardiology saw patient in ED stating possible AF although a lot of artifact on initial EKG. Cardio stating pt is a poor candidate for systemic AC due to recent falls, high risk of bleed.   Patient seen and examined at bedside. Pt lying comfortably in NAD. Pt offering no acute complaints.    VS: HR: 120-130s BP: 116/64 RR 22 SP02: 99 on 2L NC    PHYSICAL EXAM:  GENERAL: NAD, sleeping in bed  HEENT - EOMI, PERRLA, Moist mucous membranes, poor dentition. Speech mild dysarthria  CHEST/LUNG: Moderate air entry bilaterally, non-labored resp   HEART: Irregular rate and rhythm  ABDOMEN: Soft, Nontender, Nondistended; Bowel sounds present  EXTREMITIES:  2+ Peripheral Pulses, No calf tenderness, both legs wrapped  NEURO: A&0 x 1 to 2   SKIN:  warm, dry    PLAN:   - STAT EKG showing A fib (HR in 120s), EKG without acute ST or T wave abnormalities  - s/p 10mg Cardizem IV x1 HR improved to 70-80s  - 500cc bolus x 1 given for BP of 93/52 following Cardizem  - BP stable following bolus    - Am bmp, mag, phos RN notified PA of pt with tachycardia, sustaining 120s-130s      Patient does not have history of A fib but have other cardiac history. Cardiology saw patient in ED stating possible AF although a lot of artifact on initial EKG. Cardio stating pt is a poor candidate for systemic AC due to recent falls, high risk of bleed.   Patient seen and examined at bedside. Pt lying comfortably in NAD. Pt offering no acute complaints.    VS: HR: 120-130s BP: 116/64 RR 22 SP02: 99 on 2L NC    PHYSICAL EXAM:  GENERAL: NAD, sleeping in bed  HEENT - EOMI, PERRLA, Moist mucous membranes, poor dentition. Speech mild dysarthria  CHEST/LUNG: Moderate air entry bilaterally, non-labored resp   HEART: Irregular rate and rhythm  ABDOMEN: Soft, Nontender, Nondistended; Bowel sounds present  EXTREMITIES:  2+ Peripheral Pulses, No calf tenderness, both legs wrapped  NEURO: A&0 x 1 to 2   SKIN:  warm, dry    PLAN:   - STAT EKG showing A fib (HR in 120s), EKG without acute ST or T wave abnormalities  - s/p 10mg Cardizem IV x1 HR improved to 80s  - 500cc bolus x 1 given for BP of 93/52 following Cardizem  - BP stable following bolus    - Am bmp, mag, phos    f/u at 5am Pt is still asymptomatic, in NAD, sleeping in bed. Patient HR ranging from 80s-120s without sustained HR >110. Consider oral rate control regimen. BP improved s/p fluids now 103/68    RN instructed to notify provider if any changes occur in pt status. Will continue to monitor

## 2020-12-26 LAB
ALBUMIN SERPL ELPH-MCNC: 3.1 G/DL — LOW (ref 3.3–5.2)
ALP SERPL-CCNC: 106 U/L — SIGNIFICANT CHANGE UP (ref 40–120)
ALT FLD-CCNC: 60 U/L — HIGH
ANION GAP SERPL CALC-SCNC: 9 MMOL/L — SIGNIFICANT CHANGE UP (ref 5–17)
AST SERPL-CCNC: 110 U/L — HIGH
BILIRUB SERPL-MCNC: 0.2 MG/DL — LOW (ref 0.4–2)
BUN SERPL-MCNC: 28 MG/DL — HIGH (ref 8–20)
CALCIUM SERPL-MCNC: 8.9 MG/DL — SIGNIFICANT CHANGE UP (ref 8.6–10.2)
CHLORIDE SERPL-SCNC: 106 MMOL/L — SIGNIFICANT CHANGE UP (ref 98–107)
CO2 SERPL-SCNC: 26 MMOL/L — SIGNIFICANT CHANGE UP (ref 22–29)
CREAT SERPL-MCNC: 0.79 MG/DL — SIGNIFICANT CHANGE UP (ref 0.5–1.3)
CULTURE RESULTS: SIGNIFICANT CHANGE UP
CULTURE RESULTS: SIGNIFICANT CHANGE UP
GLUCOSE SERPL-MCNC: 104 MG/DL — HIGH (ref 70–99)
HCT VFR BLD CALC: 37.7 % — SIGNIFICANT CHANGE UP (ref 34.5–45)
HGB BLD-MCNC: 11 G/DL — LOW (ref 11.5–15.5)
MAGNESIUM SERPL-MCNC: 2.2 MG/DL — SIGNIFICANT CHANGE UP (ref 1.6–2.6)
MCHC RBC-ENTMCNC: 26.6 PG — LOW (ref 27–34)
MCHC RBC-ENTMCNC: 29.2 GM/DL — LOW (ref 32–36)
MCV RBC AUTO: 91.3 FL — SIGNIFICANT CHANGE UP (ref 80–100)
ORGANISM # SPEC MICROSCOPIC CNT: SIGNIFICANT CHANGE UP
PHOSPHATE SERPL-MCNC: 2.7 MG/DL — SIGNIFICANT CHANGE UP (ref 2.4–4.7)
PLATELET # BLD AUTO: 203 K/UL — SIGNIFICANT CHANGE UP (ref 150–400)
POTASSIUM SERPL-MCNC: 4.2 MMOL/L — SIGNIFICANT CHANGE UP (ref 3.5–5.3)
POTASSIUM SERPL-SCNC: 4.2 MMOL/L — SIGNIFICANT CHANGE UP (ref 3.5–5.3)
PROT SERPL-MCNC: 7.3 G/DL — SIGNIFICANT CHANGE UP (ref 6.6–8.7)
RBC # BLD: 4.13 M/UL — SIGNIFICANT CHANGE UP (ref 3.8–5.2)
RBC # FLD: 16.9 % — HIGH (ref 10.3–14.5)
SODIUM SERPL-SCNC: 141 MMOL/L — SIGNIFICANT CHANGE UP (ref 135–145)
SPECIMEN SOURCE: SIGNIFICANT CHANGE UP
WBC # BLD: 6.92 K/UL — SIGNIFICANT CHANGE UP (ref 3.8–10.5)
WBC # FLD AUTO: 6.92 K/UL — SIGNIFICANT CHANGE UP (ref 3.8–10.5)

## 2020-12-26 PROCEDURE — 99233 SBSQ HOSP IP/OBS HIGH 50: CPT

## 2020-12-26 RX ADMIN — Medication 250 MILLIGRAM(S): at 05:07

## 2020-12-26 RX ADMIN — ENOXAPARIN SODIUM 40 MILLIGRAM(S): 100 INJECTION SUBCUTANEOUS at 13:27

## 2020-12-26 RX ADMIN — Medication 30 MILLIGRAM(S): at 18:08

## 2020-12-26 RX ADMIN — Medication 1 TABLET(S): at 13:27

## 2020-12-26 RX ADMIN — Medication 325 MILLIGRAM(S): at 13:25

## 2020-12-26 RX ADMIN — Medication 250 MILLIGRAM(S): at 18:08

## 2020-12-26 RX ADMIN — CEFTRIAXONE 100 MILLIGRAM(S): 500 INJECTION, POWDER, FOR SOLUTION INTRAMUSCULAR; INTRAVENOUS at 13:36

## 2020-12-26 RX ADMIN — Medication 30 MILLIGRAM(S): at 23:00

## 2020-12-26 RX ADMIN — Medication 1 MILLIGRAM(S): at 13:43

## 2020-12-26 RX ADMIN — Medication 81 MILLIGRAM(S): at 13:27

## 2020-12-26 RX ADMIN — Medication 500 MILLIGRAM(S): at 13:26

## 2020-12-26 RX ADMIN — Medication 30 MILLIGRAM(S): at 13:28

## 2020-12-26 RX ADMIN — Medication 100 MILLIGRAM(S): at 13:27

## 2020-12-26 RX ADMIN — Medication 30 MILLIGRAM(S): at 05:06

## 2020-12-26 NOTE — PROGRESS NOTE ADULT - SUBJECTIVE AND OBJECTIVE BOX
[facility]                              [address] [address]                                                 PHONE: [phone]                                                 FAX: [phone]  -----------------------------------------------------------------------------------------------  Pt seen and examined. FU for  shortness of breath     Overnight events/Complaints: Pt comfortable. AF rates controlled    Vital Signs Last 24 Hrs  T(C): 36.7 (26 Dec 2020 04:51), Max: 36.7 (26 Dec 2020 04:51)  T(F): 98.1 (26 Dec 2020 04:51), Max: 98.1 (26 Dec 2020 04:51)  HR: 93 (26 Dec 2020 04:51) (93 - 115)  BP: 105/74 (26 Dec 2020 04:51) (105/74 - 115/67)  BP(mean): --  RR: 19 (26 Dec 2020 04:51) (19 - 20)  SpO2: 100% (26 Dec 2020 04:51) (97% - 100%)  I&O's Summary    PHYSICAL EXAM:  General: Appears well developed, well nourished alert and cooperative.  HEENT: Head; normocephalic, atraumatic.  Eyes: Pupils reactive, cornea wnl.  Neck: Supple, no nodes adenopathy, no NVD or carotid bruit or thyromegaly.  CARDIOVASCULAR: Normal S1 and S2, 3/6 murmur, rub, gallop or lift.   LUNGS: No rales, rhonchi or wheeze. Normal breath sounds bilaterally.  ABDOMEN: Soft, nontender without mass or organomegaly. bowel sounds normoactive.  EXTREMITIES: No clubbing, cyanosis or edema. Distal pulses wnl.   SKIN: warm and dry with normal turgor.  NEURO: Alert/oriented x 1  PSYCH: flat  affect.        LABS:                                   10.4   5.84  )-----------( 207      ( 25 Dec 2020 09:15 )             34.3     12-25    141  |  108<H>  |  35.0<H>  ----------------------------<  85  4.0   |  23.0  |  0.83    Ca    8.3<L>      25 Dec 2020 09:15    TPro  6.4<L>  /  Alb  2.6<L>  /  TBili  0.2<L>  /  DBili  0.1  /  AST  127<H>  /  ALT  57<H>  /  AlkPhos  93  12-25    CARDIAC MARKERS ( 25 Dec 2020 09:15 )  x     / 0.08 ng/mL / 538 U/L / x     / 14.8 ng/mL    RADIOLOGY & ADDITIONAL STUDIES: (reviewed)  CXR was independently visualized/reviewed  and demonstrated: cardiomegaly    CARDIOLOGY TESTING:(reviewed)     ECHOCARDIOGRAM independently visualized/reviewed and demonstrated :    1. Left ventricular ejection fraction, by visual estimation, is 60 to 65%.   2. Normal global left ventricular systolic function.   3. Spectral Doppler shows impaired relaxation pattern of left ventricular myocardial filling (Grade I diastolic dysfunction).   4. Mild thickening of the anterior and posterior mitral valve leaflets.   5. Mild to moderate mitral valve regurgitation.   6. Severe mitral annular calcification.   7. Severe tricuspid regurgitation.   8. Mild to moderate aortic regurgitation.   9. Estimated pulmonary artery systolic pressure is 56.3 mmHg assuming a right atrial pressure of 3 mmHg, which is consistent with moderate pulmonary hypertension.  10. The aortic valve mean gradient is 23.4 mmHg consistent with moderate aortic stenosis.  11. The Dimesionless Index value is .30.    Telemetry was independently visualized/reviewed AF rate controlled    MEDICATIONS:(reviewed)  MEDICATIONS  (STANDING):  ascorbic acid 500 milliGRAM(s) Oral daily  aspirin  chewable 81 milliGRAM(s) Oral daily  ceFAZolin   IVPB      ceFAZolin   IVPB 1000 milliGRAM(s) IV Intermittent every 8 hours  diltiazem    Tablet 30 milliGRAM(s) Oral four times a day  enoxaparin Injectable 40 milliGRAM(s) SubCutaneous daily  ferrous    sulfate 325 milliGRAM(s) Oral daily  folic acid 1 milliGRAM(s) Oral daily  multivitamin 1 Tablet(s) Oral daily  saccharomyces boulardii 250 milliGRAM(s) Oral two times a day  thiamine 100 milliGRAM(s) Oral daily

## 2020-12-26 NOTE — PROGRESS NOTE ADULT - SUBJECTIVE AND OBJECTIVE BOX
seen for cellulitis/bacteremia    no acute complaints  more awake, wants breakfast (seen in am)  ros negative     MEDICATIONS  (STANDING):  ascorbic acid 500 milliGRAM(s) Oral daily  aspirin  chewable 81 milliGRAM(s) Oral daily  cefTRIAXone   IVPB 2000 milliGRAM(s) IV Intermittent every 24 hours  diltiazem    Tablet 30 milliGRAM(s) Oral four times a day  enoxaparin Injectable 40 milliGRAM(s) SubCutaneous daily  ferrous    sulfate 325 milliGRAM(s) Oral daily  folic acid 1 milliGRAM(s) Oral daily  multivitamin 1 Tablet(s) Oral daily  saccharomyces boulardii 250 milliGRAM(s) Oral two times a day  thiamine 100 milliGRAM(s) Oral daily    MEDICATIONS  (PRN):  acetaminophen   Tablet .. 650 milliGRAM(s) Oral every 6 hours PRN Temp greater or equal to 38C (100.4F), Mild Pain (1 - 3), Moderate Pain (4 - 6)  diltiazem Injectable 5 milliGRAM(s) IV Push every 4 hours PRN HR>130 sustained  haloperidol    Injectable 1 milliGRAM(s) IntraMuscular every 6 hours PRN Agitation  ondansetron Injectable 4 milliGRAM(s) IV Push every 6 hours PRN Nausea and/or Vomiting      Allergies    No Known Allergies      Vital Signs Last 24 Hrs  T(C): 36.7 (26 Dec 2020 04:51), Max: 36.7 (26 Dec 2020 04:51)  T(F): 98.1 (26 Dec 2020 04:51), Max: 98.1 (26 Dec 2020 04:51)  HR: 93 (26 Dec 2020 04:51) (93 - 115)  BP: 105/74 (26 Dec 2020 04:51) (105/74 - 115/67)  BP(mean): --  RR: 19 (26 Dec 2020 04:51) (19 - 20)  SpO2: 100% (26 Dec 2020 04:51) (97% - 100%)    PHYSICAL EXAM:    GENERAL: NAD  CHEST/LUNG: Clear to percussion bilaterally  HEART: Regular rate and rhythm; S1 S2  ABDOMEN: Soft, Nontender,  Bowel sounds present  EXTREMITIES:  LLE swelling with erythema.  NERVOUS SYSTEM:  Alert & Oriented X3, Motor Strength 5/5 B/L upper and lower extremities    LABS:                        11.0   6.92  )-----------( 203      ( 26 Dec 2020 10:50 )             37.7     12-    141  |  106  |  28.0<H>  ----------------------------<  104<H>  4.2   |  26.0  |  0.79    Ca    8.9      26 Dec 2020 10:50  Phos  2.7       Mg     2.2         TPro  7.3  /  Alb  3.1<L>  /  TBili  0.2<L>  /  DBili  x   /  AST  110<H>  /  ALT  60<H>  /  AlkPhos  106  12      Urinalysis Basic - ( 24 Dec 2020 19:42 )    Color: Yellow / Appearance: Clear / S.025 / pH: x  Gluc: x / Ketone: Trace  / Bili: Negative / Urobili: Negative mg/dL   Blood: x / Protein: 30 mg/dL / Nitrite: Negative   Leuk Esterase: Negative / RBC: 0-2 /HPF / WBC 0-2   Sq Epi: x / Non Sq Epi: Occasional / Bacteria: Few        CAPILLARY BLOOD GLUCOSE            RADIOLOGY & ADDITIONAL TESTS:

## 2020-12-26 NOTE — PROGRESS NOTE ADULT - ASSESSMENT
89y Female with past medical history significant for HFpEF, moderate pHTN, severe TR, severe MAC, moderate AS, hypertension who was last admitted for PNA and cellulitis who presents after found down. Patient denies loss of consciousness and reports tripping on her phone cord with an inability to get off the floor. She denies chest pain and palpitations. In the ER with BNP elevation and well as mild troponin and CPK elevation.   Tele with AF rates controlled    1.  Continue low dose cardizem for AF. Not a candidate for anticoagulation given age  2.  Echo with severe TR, moderate AS and mild-moderate MR  3.  On antibiotics for cellulitis  4.  Conservative management given age and comorbidities.

## 2020-12-26 NOTE — PROGRESS NOTE ADULT - ASSESSMENT
89 years old female with PMH of Recurrent Falls, Thoracic Compression Fractures, Diastolic Heart Failure, Severe TR, LE Edema, HTN and [place] brought by EMS after she was found on the floor. As per daughter, patient lives alone and uses walker for ambulation. Daughter visits her three times a week and on Monday she was doing well. Today, when she went to see her, she was found on kitchen floor. She was alert and awake and was not complaining of anything. No obvious injury. She could not pick her up so she called EMS. As per daughter, she must have fallen between yesterday late afternoon to this morning as she had picked up mail yesterday afternoon. She was recently treated for Pneumonia and Cellulitis in Lower Extremities.   In ER, she was hypothermic, no obvious injury was found. Labs showed leukocytosis, elevated lactate / CPK / LFTs / BNP. Troponin mildly elevated. She was given NS 1500 ml. CT Head and C. Spine without any acute findings except patchy ground glass opacities in the bilateral upper lungs.     strep bacteremia, likely LLE cellulitis     ID following--ceftriaxone,    f/u repeat blood cultures    LE duplex negative    acute metabolic encephalopathy: likely due to infection    monitor, avoid sedatives    CT head negative for acute pathology    rhabdo: resolved    AFib with RVR: echo preserved EF, severe TR, severe AS    low dose diltiazem. cardiology following.    no a/c per cardio    Recurrent Falls  - PT Eval recommends JOCELYNN vs 24/7 assist at home    elevated Troponin    likely demand    Chronic Diastolic Heart Failure  - Does not look in volume overload  - Will hold Lasix for now      GOC: DNR/I.    DVT Prophylaxis -- Lovenox 40 mg SQ    Dispo: once cultures negative

## 2020-12-27 LAB
ANION GAP SERPL CALC-SCNC: 9 MMOL/L — SIGNIFICANT CHANGE UP (ref 5–17)
BUN SERPL-MCNC: 27 MG/DL — HIGH (ref 8–20)
CALCIUM SERPL-MCNC: 8.9 MG/DL — SIGNIFICANT CHANGE UP (ref 8.6–10.2)
CHLORIDE SERPL-SCNC: 104 MMOL/L — SIGNIFICANT CHANGE UP (ref 98–107)
CK SERPL-CCNC: 115 U/L — SIGNIFICANT CHANGE UP (ref 25–170)
CO2 SERPL-SCNC: 27 MMOL/L — SIGNIFICANT CHANGE UP (ref 22–29)
CREAT SERPL-MCNC: 0.74 MG/DL — SIGNIFICANT CHANGE UP (ref 0.5–1.3)
GLUCOSE SERPL-MCNC: 121 MG/DL — HIGH (ref 70–99)
HCT VFR BLD CALC: 36.5 % — SIGNIFICANT CHANGE UP (ref 34.5–45)
HGB BLD-MCNC: 10.6 G/DL — LOW (ref 11.5–15.5)
MAGNESIUM SERPL-MCNC: 2.1 MG/DL — SIGNIFICANT CHANGE UP (ref 1.6–2.6)
MCHC RBC-ENTMCNC: 26.8 PG — LOW (ref 27–34)
MCHC RBC-ENTMCNC: 29 GM/DL — LOW (ref 32–36)
MCV RBC AUTO: 92.4 FL — SIGNIFICANT CHANGE UP (ref 80–100)
PLATELET # BLD AUTO: 205 K/UL — SIGNIFICANT CHANGE UP (ref 150–400)
POTASSIUM SERPL-MCNC: 4.5 MMOL/L — SIGNIFICANT CHANGE UP (ref 3.5–5.3)
POTASSIUM SERPL-SCNC: 4.5 MMOL/L — SIGNIFICANT CHANGE UP (ref 3.5–5.3)
RBC # BLD: 3.95 M/UL — SIGNIFICANT CHANGE UP (ref 3.8–5.2)
RBC # FLD: 16.8 % — HIGH (ref 10.3–14.5)
SODIUM SERPL-SCNC: 140 MMOL/L — SIGNIFICANT CHANGE UP (ref 135–145)
WBC # BLD: 6.83 K/UL — SIGNIFICANT CHANGE UP (ref 3.8–10.5)
WBC # FLD AUTO: 6.83 K/UL — SIGNIFICANT CHANGE UP (ref 3.8–10.5)

## 2020-12-27 PROCEDURE — 99233 SBSQ HOSP IP/OBS HIGH 50: CPT

## 2020-12-27 RX ORDER — FUROSEMIDE 40 MG
20 TABLET ORAL DAILY
Refills: 0 | Status: DISCONTINUED | OUTPATIENT
Start: 2020-12-27 | End: 2020-12-28

## 2020-12-27 RX ADMIN — Medication 250 MILLIGRAM(S): at 05:19

## 2020-12-27 RX ADMIN — Medication 500 MILLIGRAM(S): at 11:32

## 2020-12-27 RX ADMIN — Medication 1 TABLET(S): at 11:32

## 2020-12-27 RX ADMIN — Medication 81 MILLIGRAM(S): at 11:32

## 2020-12-27 RX ADMIN — Medication 1 MILLIGRAM(S): at 11:32

## 2020-12-27 RX ADMIN — Medication 250 MILLIGRAM(S): at 17:32

## 2020-12-27 RX ADMIN — Medication 30 MILLIGRAM(S): at 23:05

## 2020-12-27 RX ADMIN — Medication 20 MILLIGRAM(S): at 13:37

## 2020-12-27 RX ADMIN — Medication 30 MILLIGRAM(S): at 11:32

## 2020-12-27 RX ADMIN — Medication 100 MILLIGRAM(S): at 11:33

## 2020-12-27 RX ADMIN — Medication 30 MILLIGRAM(S): at 17:32

## 2020-12-27 RX ADMIN — Medication 325 MILLIGRAM(S): at 11:32

## 2020-12-27 RX ADMIN — CEFTRIAXONE 100 MILLIGRAM(S): 500 INJECTION, POWDER, FOR SOLUTION INTRAMUSCULAR; INTRAVENOUS at 13:36

## 2020-12-27 RX ADMIN — Medication 30 MILLIGRAM(S): at 05:19

## 2020-12-27 RX ADMIN — ENOXAPARIN SODIUM 40 MILLIGRAM(S): 100 INJECTION SUBCUTANEOUS at 11:32

## 2020-12-27 NOTE — PROGRESS NOTE ADULT - ASSESSMENT
89 years old female with PMH of Recurrent Falls, Thoracic Compression Fractures, Diastolic Heart Failure, Severe TR, LE Edema, HTN and [place] brought by EMS after she was found on the floor. As per daughter, patient lives alone and uses walker for ambulation. Daughter visits her three times a week and on Monday she was doing well. Today, when she went to see her, she was found on kitchen floor. She was alert and awake and was not complaining of anything. No obvious injury. She could not pick her up so she called EMS. As per daughter, she must have fallen between yesterday late afternoon to this morning as she had picked up mail yesterday afternoon. She was recently treated for Pneumonia and Cellulitis in Lower Extremities.   In ER, she was hypothermic, no obvious injury was found. Labs showed leukocytosis, elevated lactate / CPK / LFTs / BNP. Troponin mildly elevated. She was given NS 1500 ml. CT Head and C. Spine without any acute findings except patchy ground glass opacities in the bilateral upper lungs.     strep bacteremia, likely LLE cellulitis     ID following--ceftriaxone,    f/u repeat blood cultures    LE duplex negative    acute metabolic encephalopathy: likely due to infection---improved     monitor, avoid sedatives    CT head negative for acute pathology    rhabdo: resolved    AFib with RVR: echo preserved EF, severe TR, severe AS    low dose diltiazem. cardiology following.    no a/c per cardio    Recurrent Falls  - PT Eval recommends JOCELYNN vs 24/7 assist at home    elevated Troponin    likely demand    Chronic Diastolic Heart Failure  - Does not look in volume overload  - Will hold Lasix for now    dysphagia?  speech eval    GOC: DNR/I.    DVT Prophylaxis -- Lovenox 40 mg SQ    Dispo: once cultures negative

## 2020-12-27 NOTE — DIETITIAN INITIAL EVALUATION ADULT. - OTHER INFO
89 years old female with PMH of Recurrent Falls, Thoracic Compression Fractures, Diastolic Heart Failure, Severe TR, LE Edema, HTN and [place] brought by EMS after she was found on the floor. As per daughter, patient lives alone and uses walker for ambulation. Daughter visits her three times a week and on Monday she was doing well. Today, when she went to see her, she was found on kitchen floor. She was alert and awake and was not complaining of anything. No obvious injury. She could not pick her up so she called EMS. As per daughter, she must have fallen between yesterday late afternoon to this morning as she had picked up mail yesterday afternoon. She was recently treated for Pneumonia and Cellulitis in Lower Extremities.   In ER, she was hypothermic, no obvious injury was found. Labs showed leukocytosis, elevated lactate / CPK / LFTs / BNP. Troponin mildly elevated. She was given NS 1500 ml. CT Head and C. Spine without any acute findings except patchy ground glass opacities in the bilateral upper lungs.     appears debilitated - no current weight

## 2020-12-27 NOTE — DIETITIAN INITIAL EVALUATION ADULT. - PERTINENT MEDS FT
MEDICATIONS  (STANDING):  ascorbic acid 500 milliGRAM(s) Oral daily  aspirin  chewable 81 milliGRAM(s) Oral daily  cefTRIAXone   IVPB 2000 milliGRAM(s) IV Intermittent every 24 hours  diltiazem    Tablet 30 milliGRAM(s) Oral four times a day  enoxaparin Injectable 40 milliGRAM(s) SubCutaneous daily  ferrous    sulfate 325 milliGRAM(s) Oral daily  folic acid 1 milliGRAM(s) Oral daily  furosemide    Tablet 20 milliGRAM(s) Oral daily  multivitamin 1 Tablet(s) Oral daily  saccharomyces boulardii 250 milliGRAM(s) Oral two times a day  thiamine 100 milliGRAM(s) Oral daily    MEDICATIONS  (PRN):  acetaminophen   Tablet .. 650 milliGRAM(s) Oral every 6 hours PRN Temp greater or equal to 38C (100.4F), Mild Pain (1 - 3), Moderate Pain (4 - 6)  diltiazem Injectable 5 milliGRAM(s) IV Push every 4 hours PRN HR>130 sustained  haloperidol    Injectable 1 milliGRAM(s) IntraMuscular every 6 hours PRN Agitation  ondansetron Injectable 4 milliGRAM(s) IV Push every 6 hours PRN Nausea and/or Vomiting

## 2020-12-27 NOTE — PROGRESS NOTE ADULT - SUBJECTIVE AND OBJECTIVE BOX
[facility], THE [facility]ART [facility]                                   [address] [place] [address]                                                      PHONE: [phone]                                                         FAX: [phone]  [email or web address]  ----------------------------------------------------------------------------------------------------------------------------    Pt seen and examined. FU for  shortness of breath     Overnight events/Complaints: Pt comfortable. AF rates controlled    INTERPRETATION OF TELEMETRY (personally reviewed)    ECHOCARDIOGRAM independently visualized/reviewed and demonstrated :    1. Left ventricular ejection fraction, by visual estimation, is 60 to 65%.   2. Normal global left ventricular systolic function.   3. Spectral Doppler shows impaired relaxation pattern of left ventricular myocardial filling (Grade I diastolic dysfunction).   4. Mild thickening of the anterior and posterior mitral valve leaflets.   5. Mild to moderate mitral valve regurgitation.   6. Severe mitral annular calcification.   7. Severe tricuspid regurgitation.   8. Mild to moderate aortic regurgitation.   9. Estimated pulmonary artery systolic pressure is 56.3 mmHg assuming a right atrial pressure of 3 mmHg, which is consistent with moderate pulmonary hypertension.  10. The aortic valve mean gradient is 23.4 mmHg consistent with moderate aortic stenosis.  11. The Dimesionless Index value is .30.    PAST MEDICAL & SURGICAL HISTORY:  Coronary artery disease involving native coronary artery of native heart without angina pectoris  CHF (congestive heart failure)  History of cataract extraction    No Known Allergies    MEDICATIONS  (STANDING):  ascorbic acid 500 milliGRAM(s) Oral daily  aspirin  chewable 81 milliGRAM(s) Oral daily  cefTRIAXone   IVPB 2000 milliGRAM(s) IV Intermittent every 24 hours  diltiazem    Tablet 30 milliGRAM(s) Oral four times a day  enoxaparin Injectable 40 milliGRAM(s) SubCutaneous daily  ferrous    sulfate 325 milliGRAM(s) Oral daily  folic acid 1 milliGRAM(s) Oral daily  multivitamin 1 Tablet(s) Oral daily  saccharomyces boulardii 250 milliGRAM(s) Oral two times a day  thiamine 100 milliGRAM(s) Oral daily    MEDICATIONS  (PRN):  acetaminophen   Tablet .. 650 milliGRAM(s) Oral every 6 hours PRN Temp greater or equal to 38C (100.4F), Mild Pain (1 - 3), Moderate Pain (4 - 6)  diltiazem Injectable 5 milliGRAM(s) IV Push every 4 hours PRN HR>130 sustained  haloperidol    Injectable 1 milliGRAM(s) IntraMuscular every 6 hours PRN Agitation  ondansetron Injectable 4 milliGRAM(s) IV Push every 6 hours PRN Nausea and/or Vomiting    Vital Signs Last 24 Hrs  T(C): 36.4 (27 Dec 2020 10:23), Max: 36.7 (26 Dec 2020 17:04)  T(F): 97.6 (27 Dec 2020 10:23), Max: 98.1 (26 Dec 2020 17:04)  HR: 111 (27 Dec 2020 10:23) (98 - 111)  BP: 108/70 (27 Dec 2020 10:23) (100/62 - 130/72)  BP(mean): --  RR: 19 (27 Dec 2020 10:23) (18 - 19)  SpO2: 100% (27 Dec 2020 10:23) (99% - 100%)  ICU Vital Signs Last 24 Hrs    PHYSICAL EXAM:  General: Appears well developed, well nourished alert and cooperative.  HEENT: Head; normocephalic, atraumatic. Pupils reactive, cornea wnl. Neck supple, no nodes adenopathy, no JVD  CARDIOVASCULAR: Normal S1 and S2, 1/6 NIDIA, no rub, gallop or lift.   LUNGS: No rales, rhonchi or wheeze. Normal breath sounds bilaterally.  ABDOMEN: Soft, nontender without mass or organomegaly. bowel sounds normoactive.  EXTREMITIES: No clubbing, cyanosis or edema.   SKIN: warm and dry with normal turgor.  NEURO: Alert/oriented x 1/normal motor exam.   PSYCH: normal affect.        LABS:                        10.6   6.83  )-----------( 205      ( 27 Dec 2020 07:07 )             36.5     12-27    140  |  104  |  27.0<H>  ----------------------------<  121<H>  4.5   |  27.0  |  0.74    Ca    8.9      27 Dec 2020 07:07  Phos  2.7     12-26  Mg     2.1     12-27    TPro  7.3  /  Alb  3.1<L>  /  TBili  0.2<L>  /  DBili  x   /  AST  110<H>  /  ALT  60<H>  /  AlkPhos  106  12-26    BRIAN SANDOVAL  CARDIAC MARKERS ( 27 Dec 2020 07:07 )  x     / x     / 115 U/L / x     / x        ASSESSMENT AND PLAN:  89y Female with past medical history significant for HFpEF, moderate pHTN, severe TR, severe MAC, moderate AS, hypertension who was last admitted for PNA and cellulitis who presents after found down. Patient denies loss of consciousness and reports tripping on her phone cord with an inability to get off the floor. She denies chest pain and palpitations. In the ER with BNP elevation and well as mild troponin and CPK elevation.     Atrial fibrillation with controlled rate/Bacteremia   1.  Continue low dose cardizem for AF. Not a candidate for anticoagulation given age/fall risk/frailty, lenient rate control strategy   2.  Echo with severe TR, moderate AS and mild-moderate MR: no indication for intervention   3.  On antibiotics for cellulitis/bacteremia, given advanced age/frailty not a candidate for BOUCHRA  4.  Conservative management given age and comorbidities.    No additional cardiac recommendations therefore will sign off at this time. recall for new cardiac concerns   Thank you for allowing [facility] to participate in the care of this patient.  Please feel free to call with any questions or concerns.

## 2020-12-27 NOTE — PROGRESS NOTE ADULT - SUBJECTIVE AND OBJECTIVE BOX
seen for bacteremia, afib    no acute complaints  ros negative  per RN having difficulty with thin liquids.    MEDICATIONS  (STANDING):  ascorbic acid 500 milliGRAM(s) Oral daily  aspirin  chewable 81 milliGRAM(s) Oral daily  cefTRIAXone   IVPB 2000 milliGRAM(s) IV Intermittent every 24 hours  diltiazem    Tablet 30 milliGRAM(s) Oral four times a day  enoxaparin Injectable 40 milliGRAM(s) SubCutaneous daily  ferrous    sulfate 325 milliGRAM(s) Oral daily  folic acid 1 milliGRAM(s) Oral daily  multivitamin 1 Tablet(s) Oral daily  saccharomyces boulardii 250 milliGRAM(s) Oral two times a day  thiamine 100 milliGRAM(s) Oral daily    MEDICATIONS  (PRN):  acetaminophen   Tablet .. 650 milliGRAM(s) Oral every 6 hours PRN Temp greater or equal to 38C (100.4F), Mild Pain (1 - 3), Moderate Pain (4 - 6)  diltiazem Injectable 5 milliGRAM(s) IV Push every 4 hours PRN HR>130 sustained  haloperidol    Injectable 1 milliGRAM(s) IntraMuscular every 6 hours PRN Agitation  ondansetron Injectable 4 milliGRAM(s) IV Push every 6 hours PRN Nausea and/or Vomiting      Allergies    No Known Allergies    Vital Signs Last 24 Hrs  T(C): 36.4 (27 Dec 2020 10:23), Max: 36.7 (26 Dec 2020 17:04)  T(F): 97.6 (27 Dec 2020 10:23), Max: 98.1 (26 Dec 2020 17:04)  HR: 111 (27 Dec 2020 10:23) (98 - 111)  BP: 108/70 (27 Dec 2020 10:23) (100/62 - 130/72)  BP(mean): --  RR: 19 (27 Dec 2020 10:23) (18 - 19)  SpO2: 100% (27 Dec 2020 10:23) (99% - 100%)    PHYSICAL EXAM:    GENERAL: NAD  CHEST/LUNG: Clear to percussion bilaterally;  HEART: irreg irreg rate and rhythm; S1 S2  ABDOMEN: Soft, Nontender,  Bowel sounds present  EXTREMITIES:  improved LLE erythema/edema  NERVOUS SYSTEM:  Alert & Oriented X2 nonfocal neuro    LABS:                        10.6   6.83  )-----------( 205      ( 27 Dec 2020 07:07 )             36.5     12-27    140  |  104  |  27.0<H>  ----------------------------<  121<H>  4.5   |  27.0  |  0.74    Ca    8.9      27 Dec 2020 07:07  Phos  2.7     12-26  Mg     2.1     12-27    TPro  7.3  /  Alb  3.1<L>  /  TBili  0.2<L>  /  DBili  x   /  AST  110<H>  /  ALT  60<H>  /  AlkPhos  106  12-26          CAPILLARY BLOOD GLUCOSE            RADIOLOGY & ADDITIONAL TESTS:

## 2020-12-27 NOTE — DIETITIAN INITIAL EVALUATION ADULT. - WEIGHT (LBS)
Render Note In Bullet Format When Appropriate: No Detail Level: Detailed Number Of Freeze-Thaw Cycles: 2 freeze-thaw cycles Duration Of Freeze Thaw-Cycle (Seconds): 5 Consent: Verbal consent was obtained and risks were reviewed including, but not limited to, scarring, infection, bleeding, scabbing, and incomplete removal. Discussed wound care instruction Post-Care Instructions: Apply Vaseline frequently. WCI given 105

## 2020-12-27 NOTE — DIETITIAN INITIAL EVALUATION ADULT. - PERTINENT LABORATORY DATA
12-27 Na140 mmol/L Glu 121 mg/dL<H> K+ 4.5 mmol/L Cr  0.74 mg/dL BUN 27.0 mg/dL<H> Phos n/a   Alb n/a   PAB n/a

## 2020-12-28 ENCOUNTER — TRANSCRIPTION ENCOUNTER (OUTPATIENT)
Age: 85
End: 2020-12-28

## 2020-12-28 LAB
CULTURE RESULTS: SIGNIFICANT CHANGE UP
SPECIMEN SOURCE: SIGNIFICANT CHANGE UP

## 2020-12-28 PROCEDURE — 99232 SBSQ HOSP IP/OBS MODERATE 35: CPT

## 2020-12-28 PROCEDURE — ZZZZZ: CPT

## 2020-12-28 PROCEDURE — 99233 SBSQ HOSP IP/OBS HIGH 50: CPT

## 2020-12-28 RX ORDER — DILTIAZEM HCL 120 MG
5 CAPSULE, EXT RELEASE 24 HR ORAL EVERY 6 HOURS
Refills: 0 | Status: DISCONTINUED | OUTPATIENT
Start: 2020-12-28 | End: 2020-12-30

## 2020-12-28 RX ORDER — DILTIAZEM HCL 120 MG
5 CAPSULE, EXT RELEASE 24 HR ORAL EVERY 6 HOURS
Refills: 0 | Status: DISCONTINUED | OUTPATIENT
Start: 2020-12-28 | End: 2020-12-28

## 2020-12-28 RX ORDER — SODIUM CHLORIDE 9 MG/ML
1000 INJECTION, SOLUTION INTRAVENOUS
Refills: 0 | Status: DISCONTINUED | OUTPATIENT
Start: 2020-12-28 | End: 2020-12-30

## 2020-12-28 RX ADMIN — Medication 1 TABLET(S): at 11:26

## 2020-12-28 RX ADMIN — Medication 30 MILLIGRAM(S): at 05:26

## 2020-12-28 RX ADMIN — Medication 1 MILLIGRAM(S): at 11:26

## 2020-12-28 RX ADMIN — Medication 325 MILLIGRAM(S): at 11:26

## 2020-12-28 RX ADMIN — Medication 20 MILLIGRAM(S): at 05:26

## 2020-12-28 RX ADMIN — Medication 5 MILLIGRAM(S): at 18:19

## 2020-12-28 RX ADMIN — Medication 81 MILLIGRAM(S): at 11:26

## 2020-12-28 RX ADMIN — SODIUM CHLORIDE 50 MILLILITER(S): 9 INJECTION, SOLUTION INTRAVENOUS at 21:18

## 2020-12-28 RX ADMIN — Medication 100 MILLIGRAM(S): at 11:26

## 2020-12-28 RX ADMIN — Medication 250 MILLIGRAM(S): at 05:26

## 2020-12-28 RX ADMIN — Medication 30 MILLIGRAM(S): at 11:26

## 2020-12-28 RX ADMIN — ENOXAPARIN SODIUM 40 MILLIGRAM(S): 100 INJECTION SUBCUTANEOUS at 11:26

## 2020-12-28 RX ADMIN — CEFTRIAXONE 100 MILLIGRAM(S): 500 INJECTION, POWDER, FOR SOLUTION INTRAMUSCULAR; INTRAVENOUS at 14:23

## 2020-12-28 RX ADMIN — Medication 500 MILLIGRAM(S): at 11:26

## 2020-12-28 RX ADMIN — SODIUM CHLORIDE 50 MILLILITER(S): 9 INJECTION, SOLUTION INTRAVENOUS at 18:20

## 2020-12-28 NOTE — PROGRESS NOTE ADULT - SUBJECTIVE AND OBJECTIVE BOX
[facility]  INFECTIOUS DISEASES AND INTERNAL MEDICINE at [place]  =======================================================  Mulugeta Acosta MD  Diplomates American Board of Internal Medicine and Infectious Diseases  Tel: [phone]      Fax: [phone]  =======================================================    BRIAN SANDOVAL [id number]    Follow up:      Allergies:  No Known Allergies      REVIEW OF SYSTEMS:  CONSTITUTIONAL:  No Fever or chills  HEENT:  No diplopia or blurred vision.    CARDIOVASCULAR:  No chest pain  RESPIRATORY:  No cough, shortness of breath  GASTROINTESTINAL:  No nausea, vomiting or diarrhea.  GENITOURINARY:  No dysuria, frequency or urgency.   MUSCULOSKELETAL:  no joint aches, no muscle pain  SKIN:  LLE redness   NEUROLOGIC:  No Headaches, seizures   PSYCHIATRIC:  No disorder of thought or mood.  ENDOCRINE:  No heat or cold intolerance  HEMATOLOGICAL:  No easy bruising or bleeding.       Physical Exam:  GEN: NAD, pleasant  HEENT: normocephalic and atraumatic.  Anicteric  NECK: Supple.   LUNGS: Clear to auscultation.  HEART: Regular rate and rhythm   ABDOMEN: Soft, nontender, and nondistended.  Positive bowel sounds.    : No CVA tenderness  EXTREMITIES: Without any edema.  MSK: No joint swelling  NEUROLOGIC:  Alert and oriented, intermittently confused   PSYCHIATRIC: intermittently confused   SKIN: LLE with redness and swelling       Vitals:  T(F): 97.6 (28 Dec 2020 09:32), Max: 97.6 (28 Dec 2020 09:32)  HR: 116 (28 Dec 2020 09:32)  BP: 116/66 (28 Dec 2020 09:32)  RR: 18 (28 Dec 2020 09:32)  SpO2: 98% (28 Dec 2020 09:32) (98% - 100%)  temp max in last 48H T(F): , Max: 98.1 (12-26-20 @ 17:04)      Current Antibiotics:  cefTRIAXone   IVPB 2000 milliGRAM(s) IV Intermittent every 24 hours      Other medications:  ascorbic acid 500 milliGRAM(s) Oral daily  aspirin  chewable 81 milliGRAM(s) Oral daily  diltiazem    Tablet 30 milliGRAM(s) Oral four times a day  enoxaparin Injectable 40 milliGRAM(s) SubCutaneous daily  ferrous    sulfate 325 milliGRAM(s) Oral daily  folic acid 1 milliGRAM(s) Oral daily  furosemide    Tablet 20 milliGRAM(s) Oral daily  multivitamin 1 Tablet(s) Oral daily  saccharomyces boulardii 250 milliGRAM(s) Oral two times a day  thiamine 100 milliGRAM(s) Oral daily      Labs:             10.6   6.83  )-----------( 205      ( 27 Dec 2020 07:07 )             36.5      12-27    140  |  104  |  27.0<H>  ----------------------------<  121<H>  4.5   |  27.0  |  0.74    Ca    8.9      27 Dec 2020 07:07  Mg     2.1     12-27      Culture - Blood (collected 12-26-20 @ 10:50)  Source: .Blood Blood-Peripheral    Culture - Blood (collected 12-26-20 @ 10:50)  Source: .Blood Blood-Peripheral    Culture - Urine (collected 12-25-20 @ 02:56)  Source: .Urine Catheterized  Final Report (12-26-20 @ 00:52):    <10,000 CFU/mL Normal Urogenital Ritika    Culture - Blood (collected 12-23-20 @ 08:27)  Source: .Blood Blood-Peripheral  Final Report (12-28-20 @ 09:00):    No growth at 5 days.    Culture - Blood (collected 12-23-20 @ 08:26)  Source: .Blood Blood-Peripheral  Gram Stain (12-25-20 @ 08:57):    Growth in anaerobic bottle: Gram Positive Cocci in Pairs and Chains    ***Blood Panel PCR results on this specimen are available    approximately 3 hours after the Gram stain result.***    Gram stain, PCR, and/or culture results may not always    correspond due to difference in methodologies.    ************************************************************    This PCR assay was performed using [place].    The following targets are tested for: Enterococcus,    vancomycin resistant enterococci, Listeriamonocytogenes,    coagulase negative staphylococci, S. aureus,    methicillin resistant S. aureus, Streptococcus agalactiae    (Group B), S. pneumoniae, S. pyogenes (Group A),    Acinetobacter baumannii, Enterobacter cloacae, E. coli,    Klebsiella oxytoca, K.pneumoniae, Proteus sp.,    Serratia marcescens, Haemophilus influenzae,    Neisseria meningitidis, Pseudomonas aeruginosa, Candida    albicans, C. glabrata, C krusei, C parapsilosis,    C. tropicalis and the KPC resistance gene.    Gram Stain and BCID performed by:    [facility]    [address] [place] [id number]    Final Report (12-26-20 @ 17:09):    Growth in anaerobic bottle: Streptococcus mitis/oralis group    Alpha hemolytic strep    (not Strep. pneumoniae or Enterococcus)    Single set isolate, possible contaminant. Contact    Microbiology if susceptibility testing clinically    indicated.  Organism: Blood Culture PCR (12-26-20 @ 17:09)    Sensitivities:      -  Streptococcus sp. (Not Grp A, B or S pneumoniae): Detec      Method Type: PCR  Organism: Blood Culture PCR (12-25-20 @ 09:08)      WBC Count: 6.83 K/uL (12-27-20 @ 07:07)  WBC Count: 6.92 K/uL (12-26-20 @ 10:50)  WBC Count: 5.84 K/uL (12-25-20 @ 09:15)  WBC Count: 10.65 K/uL (12-24-20 @ 09:22)    Creatinine, Serum: 0.74 mg/dL (12-27-20 @ 07:07)  Creatinine, Serum: 0.79 mg/dL (12-26-20 @ 10:50)  Creatinine, Serum: 0.83 mg/dL (12-25-20 @ 09:15)  Creatinine, Serum: 0.97 mg/dL (12-24-20 @ 09:22)      COVID-19 IgG Antibody Index: 0.06 Index (12-24-20 @ 02:42)  COVID-19 IgG Antibody Interpretation: Negative (12-24-20 @ 02:42)  COVID-19 PCR: NotDetec (12-23-20 @ 08:20)      < from: TTE Echo Complete w/o Contrast w/ Doppler (12.24.20 @ 21:12) >  Summary:   1. Normal left ventricular internal cavity size.   2. Left ventricular ejection fraction, by visual estimation, is 55 to 60%.   3. Normal global left ventricular systolic function.   4. Spectral Doppler shows pseudonormal pattern of left ventricular myocardial filling (Grade II diastolic dysfunction).   5. Normal right ventricular size and function.   6. Severely enlarged left atrium.   7. Severely enlarged right atrium.   8. Mild thickening of the anterior and posterior mitral valve leaflets.   9. Severe mitral annular calcification.  10.Moderate mitral valve regurgitation.  11. Severe tricuspid regurgitation.  12. Estimated pulmonary artery systolic pressure is 57.5 mmHg assuming a right atrial pressure of 15 mmHg, which is consistent with moderate pulmonary hypertension.  13. Severe aortic valve stenosis.  14. Mild to moderate aortic regurgitation.  15. Peak transaortic gradient equals 66.3 mmHg, mean transaortic gradient equals 35.8 mmHg, the calculated aortic valve area equals 0.63 cm² by the continuity equation consistent with severe aortic stenosis.  16. The Dimesionless Index value is 0.21.  17. There is no evidence of pericardial effusion.    < end of copied text >

## 2020-12-28 NOTE — PROGRESS NOTE ADULT - SUBJECTIVE AND OBJECTIVE BOX
seen for bacteremia, cellulitis    no complaints  ros negative     MEDICATIONS  (STANDING):  ascorbic acid 500 milliGRAM(s) Oral daily  aspirin  chewable 81 milliGRAM(s) Oral daily  cefTRIAXone   IVPB 2000 milliGRAM(s) IV Intermittent every 24 hours  diltiazem    Tablet 30 milliGRAM(s) Oral four times a day  enoxaparin Injectable 40 milliGRAM(s) SubCutaneous daily  ferrous    sulfate 325 milliGRAM(s) Oral daily  folic acid 1 milliGRAM(s) Oral daily  furosemide    Tablet 20 milliGRAM(s) Oral daily  multivitamin 1 Tablet(s) Oral daily  saccharomyces boulardii 250 milliGRAM(s) Oral two times a day  thiamine 100 milliGRAM(s) Oral daily    MEDICATIONS  (PRN):  acetaminophen   Tablet .. 650 milliGRAM(s) Oral every 6 hours PRN Temp greater or equal to 38C (100.4F), Mild Pain (1 - 3), Moderate Pain (4 - 6)  diltiazem Injectable 5 milliGRAM(s) IV Push every 4 hours PRN HR>130 sustained  haloperidol    Injectable 1 milliGRAM(s) IntraMuscular every 6 hours PRN Agitation  ondansetron Injectable 4 milliGRAM(s) IV Push every 6 hours PRN Nausea and/or Vomiting      Allergies    No Known Allergies      Vital Signs Last 24 Hrs  T(C): 36.4 (28 Dec 2020 09:32), Max: 36.4 (27 Dec 2020 16:27)  T(F): 97.6 (28 Dec 2020 09:32), Max: 97.6 (28 Dec 2020 09:32)  HR: 116 (28 Dec 2020 09:32) (96 - 118)  BP: 116/66 (28 Dec 2020 09:32) (110/80 - 116/70)  BP(mean): --  RR: 18 (28 Dec 2020 09:32) (18 - 18)  SpO2: 98% (28 Dec 2020 09:32) (98% - 100%)    PHYSICAL EXAM:    GENERAL: NAD  CHEST/LUNG: Clear to percussion bilaterally  HEART: irreg irreg rate and rhythm; S1 S2  ABDOMEN: Soft, Nontender, Nondistended; Bowel sounds present  EXTREMITIES: trace edema LLE  improved erythema.  NERVOUS SYSTEM:  nonfocal, gen weakness.  LABS:                        10.6   6.83  )-----------( 205      ( 27 Dec 2020 07:07 )             36.5     12-27    140  |  104  |  27.0<H>  ----------------------------<  121<H>  4.5   |  27.0  |  0.74    Ca    8.9      27 Dec 2020 07:07  Phos  2.7     12-26  Mg     2.1     12-27    TPro  7.3  /  Alb  3.1<L>  /  TBili  0.2<L>  /  DBili  x   /  AST  110<H>  /  ALT  60<H>  /  AlkPhos  106  12-26          CAPILLARY BLOOD GLUCOSE            RADIOLOGY & ADDITIONAL TESTS:

## 2020-12-28 NOTE — PROGRESS NOTE ADULT - ASSESSMENT
89 years old female with PMH of Recurrent Falls, Thoracic Compression Fractures, Diastolic Heart Failure, Severe TR, LE Edema, HTN and [place] brought by EMS after she was found on the floor. As per daughter, patient lives alone and uses walker for ambulation. Daughter visits her three times a week and on Monday she was doing well. Today, when she went to see her, she was found on kitchen floor. She was alert and awake and was not complaining of anything. No obvious injury. She could not pick her up so she called EMS. As per daughter, she must have fallen between yesterday late afternoon to this morning as she had picked up mail yesterday afternoon. She was recently treated for Pneumonia and Cellulitis in Lower Extremities.   In ER, she was hypothermic, no obvious injury was found. Labs showed leukocytosis, elevated lactate / CPK / LFTs / BNP. Troponin mildly elevated. She was given NS 1500 ml. CT Head and C. Spine without any acute findings except patchy ground glass opacities in the bilateral upper lungs.     strep bacteremia, likely LLE cellulitis     ID following--ceftriaxone,    f/u repeat blood cultures    LE duplex negative    acute metabolic encephalopathy: likely due to infection---improved     monitor, avoid sedatives    CT head negative for acute pathology    rhabdo: resolved    AFib with RVR: echo preserved EF, severe TR, severe AS    low dose diltiazem. cardiology following.    no a/c per cardio    Recurrent Falls  - PT Eval recommends JOCELYNN vs 24/7 assist at home    elevated Troponin    likely demand    Chronic Diastolic Heart Failure  - Does not look in volume overload  - restart lasix    dysphagia?  speech eval    GOC: DNR/I.    DVT Prophylaxis -- Lovenox 40 mg SQ    Dispo: once cultures negative

## 2020-12-28 NOTE — SWALLOW BEDSIDE ASSESSMENT ADULT - SWALLOW EVAL: DIAGNOSIS
Pt presents w/mild brendon-pharyngeal dysphagia. Oral stage noted for reduced manipulation of thin liquids, resulting in suspected premature pharyngeal entry given reduced containment 2* overall oral musculature weakness. Improvement in manipulation w/further trials nectar, honey, puree. Suspected pharyngeal dysphagia across all trials, w/immediate increase WOB noted following all trials, Pt reporting, "I don't know why I have this shortness of breath following all food".

## 2020-12-28 NOTE — DISCHARGE NOTE NURSING/CASE MANAGEMENT/SOCIAL WORK - CASE MANAGER'S NAME
COLIN MEREDITH [facility] COLIN MEREDITH [facility] [place] RN; Tanja Desir RN CCC  [phone]

## 2020-12-28 NOTE — SWALLOW BEDSIDE ASSESSMENT ADULT - SLP GENERAL OBSERVATIONS
Pt recd awake & upright in bed, A&A Ox2, intermittent confusion, significantly [place] (L side better than R), 0/10 pain pre and post, +4L O2 via NC.

## 2020-12-28 NOTE — PROCEDURE NOTE - NSSITEPREP_SKIN_A_CORE
Lor Trimble) chlorhexidine/Adherence to aseptic technique: hand hygiene prior to donning barriers (gown, gloves), don cap and mask, sterile drape over patient

## 2020-12-28 NOTE — SWALLOW BEDSIDE ASSESSMENT ADULT - ORAL PHASE
suspected premature pharyngeal entry/Decreased anterior-posterior movement of the bolus Within functional limits

## 2020-12-28 NOTE — SWALLOW BEDSIDE ASSESSMENT ADULT - SWALLOW EVAL: ORAL MUSCULATURE
difficult for formal participation in oral motor exam, given significant [place] (no HA's present), however informal observation reveals decreased lingual strength/ROM

## 2020-12-28 NOTE — PROGRESS NOTE ADULT - ASSESSMENT
89y  Female with h/o Recurrent Falls, Thoracic Compression Fractures, Diastolic Heart Failure, Severe TR, LE Edema, HTN. Patient was brought by EMS after she was found on the floor. Patient lives alone and uses walker for ambulation. Daughter visits her three times a week and on 12/21 she was doing well. 12/23, when she went to see her, she was found on kitchen floor. She was alert and awake and was not complaining of anything. No obvious injury. She could not pick her up so she called EMS. She was recently treated for Pneumonia and Cellulitis in Lower Extremities. In ER, she was hypothermic, no obvious injury was found. Labs showed leukocytosis, elevated lactate / CPK / LFTs / BNP. Troponin mildly elevated. She was given NS 1500 ml. CT Head and C. Spine without any acute findings except patchy ground glass opacities in the bilateral upper lungs. Started on IV Cefazolin got LLE cellulitis.      Streptococcal septicemia   LLE cellulitis       - Blood cultures reporting Streptococcus mitis/oralis  - Repeat blood cultures negative 12/26/20  - COVID 19 PCR Negative   - TTE reviewed   - Continue  Ceftriaxone 2gm IV q 24hours  - Cardiology note reviewed, not a candidate for BOUCHRA  - Will plan for 2 weeks of IV Ceftriaxone 2gm q 24hours, followed by surveillance blood cultures   - Last day of antibiotics will be 1/8/21  - Will place midline  - Trend Fever  - Trend Leukocytosis      Will sign off. Please call PRN.

## 2020-12-28 NOTE — DISCHARGE NOTE NURSING/CASE MANAGEMENT/SOCIAL WORK - NSDCFUADDAPPT_GEN_ALL_CORE_FT
STAR DOCUMENTATION:  PLAN IS FOR [facility] PLACEMENT  MEDS TO BED N/A;FACILITY WILL MANAGE MEDS   Cardiac Appt w/ Santos [phone] 1/12/20 3:30 pm Star documentation  Cardiac Appt danilo/ Santos [phone] 1/12/20 3:30 pm    plan is  [facility]   [phone]   EQUIPMENT  DELIVERED and nurse avaible today 1/3/2020 for care  co ordination

## 2020-12-28 NOTE — DISCHARGE NOTE NURSING/CASE MANAGEMENT/SOCIAL WORK - PATIENT PORTAL LINK FT
You can access the FollowMyHealth Patient Portal offered by [facility] by registering at the following website: [email or web address]. By joining [facility]’s FollowMyHealth portal, you will also be able to view your health information using other applications (apps) compatible with our system.

## 2020-12-28 NOTE — SWALLOW BEDSIDE ASSESSMENT ADULT - COMMENTS
As per MD note, "89 years old female with PMH of Recurrent Falls, Thoracic Compression Fractures, Diastolic Heart Failure, Severe TR, LE Edema, HTN and [place] brought by EMS after she was found on the floor. As per daughter, patient lives alone and uses walker for ambulation. Daughter visits her three times a week and on Monday she was doing well. Today, when she went to see her, she was found on kitchen floor. She was alert and awake and was not complaining of anything. No obvious injury. She could not pick her up so she called EMS. As per daughter, she must have fallen between yesterday late afternoon to this morning as she had picked up mail yesterday afternoon. She was recently treated for Pneumonia and Cellulitis in Lower Extremities.   In ER, she was hypothermic, no obvious injury was found. Labs showed leukocytosis, elevated lactate / CPK / LFTs / BNP. Troponin mildly elevated. She was given NS 1500 ml. CT Head and C. Spine without any acute findings except patchy ground glass opacities in the bilateral upper lungs". As per MD note, "89 years old female with PMH of Recurrent Falls, Thoracic Compression Fractures, Diastolic Heart Failure, Severe TR, LE Edema, HTN and [place] brought by EMS after she was found on the floor. As per daughter, patient lives alone and uses walker for ambulation. Daughter visits her three times a week and on Monday she was doing well. Today, when she went to see her, she was found on kitchen floor. She was alert and awake and was not complaining of anything. No obvious injury. She could not pick her up so she called EMS. As per daughter, she must have fallen between yesterday late afternoon to this morning as she had picked up mail yesterday afternoon. She was recently treated for Pneumonia and Cellulitis in Lower Extremities. In ER, she was hypothermic, no obvious injury was found. Labs showed leukocytosis, elevated lactate / CPK / LFTs / BNP. Troponin mildly elevated. She was given NS 1500 ml. CT Head and C. Spine without any acute findings except patchy ground glass opacities in the bilateral upper lungs".

## 2020-12-29 DIAGNOSIS — I25.10 ATHEROSCLEROTIC HEART DISEASE OF NATIVE CORONARY ARTERY WITHOUT ANGINA PECTORIS: ICD-10-CM

## 2020-12-29 DIAGNOSIS — Z51.5 ENCOUNTER FOR PALLIATIVE CARE: ICD-10-CM

## 2020-12-29 DIAGNOSIS — R13.10 DYSPHAGIA, UNSPECIFIED: ICD-10-CM

## 2020-12-29 DIAGNOSIS — L03.90 CELLULITIS, UNSPECIFIED: ICD-10-CM

## 2020-12-29 PROCEDURE — 99222 1ST HOSP IP/OBS MODERATE 55: CPT

## 2020-12-29 PROCEDURE — 99233 SBSQ HOSP IP/OBS HIGH 50: CPT

## 2020-12-29 PROCEDURE — 99497 ADVNCD CARE PLAN 30 MIN: CPT | Mod: 25

## 2020-12-29 PROCEDURE — 99232 SBSQ HOSP IP/OBS MODERATE 35: CPT

## 2020-12-29 RX ADMIN — SODIUM CHLORIDE 50 MILLILITER(S): 9 INJECTION, SOLUTION INTRAVENOUS at 22:49

## 2020-12-29 RX ADMIN — Medication 5 MILLIGRAM(S): at 05:47

## 2020-12-29 RX ADMIN — Medication 5 MILLIGRAM(S): at 17:10

## 2020-12-29 RX ADMIN — CEFTRIAXONE 100 MILLIGRAM(S): 500 INJECTION, POWDER, FOR SOLUTION INTRAMUSCULAR; INTRAVENOUS at 13:07

## 2020-12-29 RX ADMIN — Medication 5 MILLIGRAM(S): at 11:53

## 2020-12-29 RX ADMIN — Medication 5 MILLIGRAM(S): at 23:06

## 2020-12-29 RX ADMIN — ENOXAPARIN SODIUM 40 MILLIGRAM(S): 100 INJECTION SUBCUTANEOUS at 11:53

## 2020-12-29 RX ADMIN — Medication 5 MILLIGRAM(S): at 00:16

## 2020-12-29 NOTE — CONSULT NOTE ADULT - PROBLEM SELECTOR RECOMMENDATION 4
-Met with patient at bedside - AO x3, Kashia, frustrated with "not being able to eat anything here".  -Call placed to patient's daughter, Arianna. Arianna is the patient's surrogate, patient has no other children and her spouse passed away >30 years ago.   - Discussed GOC at length with Arianna.   -Discussed PEG vs Comfort feeds - also discussed high risk of aspiration included with comfort feeds - at the end of the conversation Arianna stated she would likely opt for comfort feeds to focus on her mother's quality of life. With that said, I approached the topic of home hospice as well. Arianna was receptive to the information, but wanted more time to think about it.   -Prior to this patient lived alone - Arianna stated that on discharge she will be living with her mother 24/7 since she is retired and has the time. Encouraged Arianna to consider hospice support at home for assistance caring for her mother.   - Plan at this time is for another swallow evaluation - will follow to see how course progresses  - Code status: Reconfirmed patient is DNR/DNI  - Will continue to support and monitor  Discussed above with Dr. Benjamin    Total Time Spent_50___ minutes  This includes chart review, patient assessment, discussion and collaboration with interdisciplinary team members, ACP planning    Due to visitation restrictions in the hospital in light of COVID pandemic, all discussions with the patient/ patient's healthcare proxy or surrogate have been done via phone or during restricted visitation hours- Advanced Care Planning - Time Spent ___25___Minutes.      Thank you for the opportunity to assist with the care of this patient.   [facility] [phone].

## 2020-12-29 NOTE — CONSULT NOTE ADULT - PROBLEM SELECTOR RECOMMENDATION 3
Extensive cardiac history  Decompensated CHF, severe TR and AS  ECHO completed this admission Extensive cardiac history  Diastolic HF, severe TR and AS  ECHO completed this admission

## 2020-12-29 NOTE — PROGRESS NOTE ADULT - SUBJECTIVE AND OBJECTIVE BOX
[facility]  INFECTIOUS DISEASES AND INTERNAL MEDICINE at [place]  =======================================================  Mulugeta Acosta MD  Diplomates American Board of Internal Medicine and Infectious Diseases  Tel: [phone]      Fax: [phone]  =======================================================    BRIAN SANDOVAL [id number]    Follow up: Sepsis    No fever or chills  No complaints       Allergies:  No Known Allergies      REVIEW OF SYSTEMS:  CONSTITUTIONAL:  No Fever or chills  HEENT:  No diplopia or blurred vision.    CARDIOVASCULAR:  No chest pain  RESPIRATORY:  No cough, shortness of breath  GASTROINTESTINAL:  No nausea, vomiting or diarrhea.  GENITOURINARY:  No dysuria, frequency or urgency.   MUSCULOSKELETAL:  no joint aches, no muscle pain  SKIN:  LLE redness   NEUROLOGIC:  No Headaches, seizures   PSYCHIATRIC:  No disorder of thought or mood.  ENDOCRINE:  No heat or cold intolerance  HEMATOLOGICAL:  No easy bruising or bleeding.       Physical Exam:  GEN: NAD, pleasant  HEENT: normocephalic and atraumatic.  Anicteric  NECK: Supple.   LUNGS: Clear to auscultation.  HEART: Regular rate and rhythm   ABDOMEN: Soft, nontender, and nondistended.  Positive bowel sounds.    : No CVA tenderness  EXTREMITIES: Without any edema.  MSK: No joint swelling  NEUROLOGIC:  Alert and oriented, intermittently confused   PSYCHIATRIC: intermittently confused   SKIN: LLE with redness and swelling       Vitals:  T(F): 97.6 (28 Dec 2020 09:32), Max: 97.6 (28 Dec 2020 09:32)  HR: 116 (28 Dec 2020 09:32)  BP: 116/66 (28 Dec 2020 09:32)  RR: 18 (28 Dec 2020 09:32)  SpO2: 98% (28 Dec 2020 09:32) (98% - 100%)  temp max in last 48H T(F): , Max: 98.1 (12-26-20 @ 17:04)      Current Antibiotics:  cefTRIAXone   IVPB 2000 milliGRAM(s) IV Intermittent every 24 hours      Other medications:  ascorbic acid 500 milliGRAM(s) Oral daily  aspirin  chewable 81 milliGRAM(s) Oral daily  diltiazem    Tablet 30 milliGRAM(s) Oral four times a day  enoxaparin Injectable 40 milliGRAM(s) SubCutaneous daily  ferrous    sulfate 325 milliGRAM(s) Oral daily  folic acid 1 milliGRAM(s) Oral daily  furosemide    Tablet 20 milliGRAM(s) Oral daily  multivitamin 1 Tablet(s) Oral daily  saccharomyces boulardii 250 milliGRAM(s) Oral two times a day  thiamine 100 milliGRAM(s) Oral daily      Labs:             10.6   6.83  )-----------( 205      ( 27 Dec 2020 07:07 )             36.5      12-27    140  |  104  |  27.0<H>  ----------------------------<  121<H>  4.5   |  27.0  |  0.74    Ca    8.9      27 Dec 2020 07:07  Mg     2.1     12-27      Culture - Blood (collected 12-26-20 @ 10:50)  Source: .Blood Blood-Peripheral    Culture - Blood (collected 12-26-20 @ 10:50)  Source: .Blood Blood-Peripheral    Culture - Urine (collected 12-25-20 @ 02:56)  Source: .Urine Catheterized  Final Report (12-26-20 @ 00:52):    <10,000 CFU/mL Normal Urogenital Ritika    Culture - Blood (collected 12-23-20 @ 08:27)  Source: .Blood Blood-Peripheral  Final Report (12-28-20 @ 09:00):    No growth at 5 days.    Culture - Blood (collected 12-23-20 @ 08:26)  Source: .Blood Blood-Peripheral  Gram Stain (12-25-20 @ 08:57):    Growth in anaerobic bottle: Gram Positive Cocci in Pairs and Chains    ***Blood Panel PCR results on this specimen are available    approximately 3 hours after the Gram stain result.***    Gram stain, PCR, and/or culture results may not always    correspond due to difference in methodologies.    ************************************************************    This PCR assay was performed using [employer].    The following targets are tested for: Enterococcus,    vancomycin resistant enterococci, Listeriamonocytogenes,    coagulase negative staphylococci, S. aureus,    methicillin resistant S. aureus, Streptococcus agalactiae    (Group B), S. pneumoniae, S. pyogenes (Group A),    Acinetobacter baumannii, Enterobacter cloacae, E. coli,    Klebsiella oxytoca, K.pneumoniae, Proteus sp.,    Serratia marcescens, Haemophilus influenzae,    Neisseria meningitidis, Pseudomonas aeruginosa, Candida    albicans, C. glabrata, C krusei, C parapsilosis,    C. tropicalis and the KPC resistance gene.    Gram Stain and BCID performed by:    [facility]    [address] [place] [id number]    Final Report (12-26-20 @ 17:09):    Growth in anaerobic bottle: Streptococcus mitis/oralis group    Alpha hemolytic strep    (not Strep. pneumoniae or Enterococcus)    Single set isolate, possible contaminant. Contact    Microbiology if susceptibility testing clinically    indicated.  Organism: Blood Culture PCR (12-26-20 @ 17:09)    Sensitivities:      -  Streptococcus sp. (Not Grp A, B or S pneumoniae): Detec      Method Type: PCR  Organism: Blood Culture PCR (12-25-20 @ 09:08)      WBC Count: 6.83 K/uL (12-27-20 @ 07:07)  WBC Count: 6.92 K/uL (12-26-20 @ 10:50)  WBC Count: 5.84 K/uL (12-25-20 @ 09:15)  WBC Count: 10.65 K/uL (12-24-20 @ 09:22)    Creatinine, Serum: 0.74 mg/dL (12-27-20 @ 07:07)  Creatinine, Serum: 0.79 mg/dL (12-26-20 @ 10:50)  Creatinine, Serum: 0.83 mg/dL (12-25-20 @ 09:15)  Creatinine, Serum: 0.97 mg/dL (12-24-20 @ 09:22)      COVID-19 IgG Antibody Index: 0.06 Index (12-24-20 @ 02:42)  COVID-19 IgG Antibody Interpretation: Negative (12-24-20 @ 02:42)  COVID-19 PCR: NotDetec (12-23-20 @ 08:20)      < from: TTE Echo Complete w/o Contrast w/ Doppler (12.24.20 @ 21:12) >  Summary:   1. Normal left ventricular internal cavity size.   2. Left ventricular ejection fraction, by visual estimation, is 55 to 60%.   3. Normal global left ventricular systolic function.   4. Spectral Doppler shows pseudonormal pattern of left ventricular myocardial filling (Grade II diastolic dysfunction).   5. Normal right ventricular size and function.   6. Severely enlarged left atrium.   7. Severely enlarged right atrium.   8. Mild thickening of the anterior and posterior mitral valve leaflets.   9. Severe mitral annular calcification.  10.Moderate mitral valve regurgitation.  11. Severe tricuspid regurgitation.  12. Estimated pulmonary artery systolic pressure is 57.5 mmHg assuming a right atrial pressure of 15 mmHg, which is consistent with moderate pulmonary hypertension.  13. Severe aortic valve stenosis.  14. Mild to moderate aortic regurgitation.  15. Peak transaortic gradient equals 66.3 mmHg, mean transaortic gradient equals 35.8 mmHg, the calculated aortic valve area equals 0.63 cm² by the continuity equation consistent with severe aortic stenosis.  16. The Dimesionless Index value is 0.21.  17. There is no evidence of pericardial effusion.    < end of copied text >

## 2020-12-29 NOTE — PROGRESS NOTE ADULT - SUBJECTIVE AND OBJECTIVE BOX
seen for afib, bacteremia    ros limited due to confusion  no events per RN    MEDICATIONS  (STANDING):  cefTRIAXone   IVPB 2000 milliGRAM(s) IV Intermittent every 24 hours  dextrose 5% + sodium chloride 0.45%. 1000 milliLiter(s) (50 mL/Hr) IV Continuous <Continuous>  diltiazem Injectable 5 milliGRAM(s) IV Push every 6 hours  enoxaparin Injectable 40 milliGRAM(s) SubCutaneous daily    MEDICATIONS  (PRN):  acetaminophen   Tablet .. 650 milliGRAM(s) Oral every 6 hours PRN Temp greater or equal to 38C (100.4F), Mild Pain (1 - 3), Moderate Pain (4 - 6)  diltiazem Injectable 5 milliGRAM(s) IV Push every 4 hours PRN HR>130 sustained  haloperidol    Injectable 1 milliGRAM(s) IntraMuscular every 6 hours PRN Agitation  ondansetron Injectable 4 milliGRAM(s) IV Push every 6 hours PRN Nausea and/or Vomiting      Allergies    No Known Allergies    Vital Signs Last 24 Hrs  T(C): 36.5 (29 Dec 2020 04:20), Max: 36.5 (29 Dec 2020 04:20)  T(F): 97.7 (29 Dec 2020 04:20), Max: 97.7 (29 Dec 2020 04:20)  HR: 88 (29 Dec 2020 11:48) (77 - 115)  BP: 110/84 (29 Dec 2020 11:48) (108/71 - 127/71)  BP(mean): --  RR: 19 (29 Dec 2020 11:48) (18 - 19)  SpO2: 97% (29 Dec 2020 11:48) (96% - 99%)    PHYSICAL EXAM:    GENERAL: NAD  CHEST/LUNG: Clear to percussion bilaterally; poor effort   HEART: Regular rate and rhythm; S1 S2  ABDOMEN: Soft, Bowel sounds present  EXTREMITIES: LLE erythema lower leg   NERVOUS SYSTEM:  Alert & Oriented X1 gen weakness.     LABS:                CAPILLARY BLOOD GLUCOSE            RADIOLOGY & ADDITIONAL TESTS:

## 2020-12-29 NOTE — CONSULT NOTE ADULT - SUBJECTIVE AND OBJECTIVE BOX
[facility]  INFECTIOUS DISEASES AND INTERNAL MEDICINE at [place]  =======================================================  Mulugeta Acosta MD  Diplomates American Board of Internal Medicine and Infectious Diseases  Tel: [phone]      Fax: [phone]  =======================================================      [id number]  BRIAN SANDOVAL    Patient unable to provide history since she is a poor historian. History obtained from the chart.     CC: Patient is a 89y old  Female who presents with a chief complaint of Fall (25 Dec 2020 11:00)      89y  Female with h/o Recurrent Falls, Thoracic Compression Fractures, Diastolic Heart Failure, Severe TR, LE Edema, HTN. Patient was brought by EMS after she was found on the floor. Patient lives alone and uses walker for ambulation. Daughter visits her three times a week and on 12/21 she was doing well. 12/23, when she went to see her, she was found on kitchen floor. She was alert and awake and was not complaining of anything. No obvious injury. She could not pick her up so she called EMS. She was recently treated for Pneumonia and Cellulitis in Lower Extremities. In ER, she was hypothermic, no obvious injury was found. Labs showed leukocytosis, elevated lactate / CPK / LFTs / BNP. Troponin mildly elevated. She was given NS 1500 ml. CT Head and C. Spine without any acute findings except patchy ground glass opacities in the bilateral upper lungs. Started on IV Cefazolin got LLE cellulitis. ID input requested.       Past Medical & Surgical Hx:  Coronary artery disease involving native coronary artery of native heart without angina pectoris  CHF (congestive heart failure)  History of cataract extraction      Social Hx:  Denies smoking      FAMILY HISTORY:  Patient does not recall family history of mother father or siblings      Allergies  No Known Allergies      REVIEW OF SYSTEMS:  CONSTITUTIONAL:  No Fever or chills  HEENT:  No diplopia or blurred vision.    CARDIOVASCULAR:  No chest pain  RESPIRATORY:  No cough, shortness of breath  GASTROINTESTINAL:  No nausea, vomiting or diarrhea.  GENITOURINARY:  No dysuria, frequency or urgency.   MUSCULOSKELETAL:  no joint aches, no muscle pain  SKIN:  LLE redness   NEUROLOGIC:  No Headaches, seizures   PSYCHIATRIC:  No disorder of thought or mood.  ENDOCRINE:  No heat or cold intolerance  HEMATOLOGICAL:  No easy bruising or bleeding.       Physical Exam:  GEN: NAD, pleasant  HEENT: normocephalic and atraumatic.  Anicteric  NECK: Supple.   LUNGS: Clear to auscultation.  HEART: Regular rate and rhythm   ABDOMEN: Soft, nontender, and nondistended.  Positive bowel sounds.    : No CVA tenderness  EXTREMITIES: Without any edema.  MSK: No joint swelling  NEUROLOGIC:  Alert and oriented, intermittently confused   PSYCHIATRIC: intermittently confused   SKIN: LLE with redness and swelling       Vitals:  T(F): 97.6 (25 Dec 2020 07:57), Max: 98.1 (25 Dec 2020 04:00)  HR: 137 (25 Dec 2020 07:57)  BP: 92/61 (25 Dec 2020 07:57)  RR: 20 (25 Dec 2020 07:57)  SpO2: 98% (25 Dec 2020 07:57) (98% - 99%)  temp max in last 48H T(F): , Max: 98.1 (12-25-20 @ 04:00)      Current Antibiotics:  ceFAZolin   IVPB      ceFAZolin   IVPB 1000 milliGRAM(s) IV Intermittent every 8 hours      Other medications:  ascorbic acid 500 milliGRAM(s) Oral daily  aspirin  chewable 81 milliGRAM(s) Oral daily  diltiazem    Tablet 30 milliGRAM(s) Oral four times a day  enoxaparin Injectable 40 milliGRAM(s) SubCutaneous daily  ferrous    sulfate 325 milliGRAM(s) Oral daily  folic acid 1 milliGRAM(s) Oral daily  multivitamin 1 Tablet(s) Oral daily  saccharomyces boulardii 250 milliGRAM(s) Oral two times a day  thiamine 100 milliGRAM(s) Oral daily      Labs:             10.4   5.84  )-----------( 207      ( 25 Dec 2020 09:15 )             34.3      12-25    141  |  108<H>  |  35.0<H>  ----------------------------<  85  4.0   |  23.0  |  0.83    Ca    8.3<L>      25 Dec 2020 09:15  Phos  3.1     12-24  Mg     2.1     12-24    TPro  6.4<L>  /  Alb  2.6<L>  /  TBili  0.2<L>  /  DBili  0.1  /  AST  127<H>  /  ALT  57<H>  /  AlkPhos  93  12-25      Culture - Blood (collected 12-23-20 @ 08:27)  Source: .Blood Blood-Peripheral    Culture - Blood (collected 12-23-20 @ 08:26)  Source: .Blood Blood-Peripheral  Gram Stain (12-25-20 @ 08:57):    Growth in anaerobic bottle: Gram Positive Cocci in Pairs and Chains    ***Blood Panel PCR results on this specimen are available    approximately 3 hours after the Gram stain result.***    Gram stain, PCR, and/or culture results may not always    correspond due to difference in methodologies.    ************************************************************    This PCR assay was performed using [employer].    The following targets are tested for: Enterococcus,    vancomycin resistant enterococci, Listeriamonocytogenes,    coagulase negative staphylococci, S. aureus,    methicillin resistant S. aureus, Streptococcus agalactiae    (Group B), S. pneumoniae, S. pyogenes (Group A),    Acinetobacter baumannii, Enterobacter cloacae, E. coli,    Klebsiella oxytoca, K.pneumoniae, Proteus sp.,    Serratia marcescens, Haemophilus influenzae,    Neisseria meningitidis, Pseudomonas aeruginosa, Candida    albicans, C. glabrata, C krusei, C parapsilosis,    C. tropicalis and the KPC resistance gene.    Gram Stain and BCID performed by:    [facility]    [address] [place] [id number]    Organism: Blood Culture PCR (12-25-20 @ 09:08)  Organism: Blood Culture PCR (12-25-20 @ 09:08)    Sensitivities:      -  Streptococcus sp. (Not Grp A, B or S pneumoniae): Detec      Method Type: PCR      WBC Count: 5.84 K/uL (12-25-20 @ 09:15)  WBC Count: 10.65 K/uL (12-24-20 @ 09:22)  WBC Count: 18.56 K/uL (12-23-20 @ 08:16)    Creatinine, Serum: 0.83 mg/dL (12-25-20 @ 09:15)  Creatinine, Serum: 0.97 mg/dL (12-24-20 @ 09:22)  Creatinine, Serum: 0.86 mg/dL (12-23-20 @ 08:16)    COVID-19 IgG Antibody Index: 0.06 Index (12-24-20 @ 02:42)  COVID-19 IgG Antibody Interpretation: Negative (12-24-20 @ 02:42)  COVID-19 PCR: NotDetec (12-23-20 @ 08:20)        < from: TTE Echo Complete w/o Contrast w/ Doppler (12.24.20 @ 21:12) >  Summary:   1. Normal left ventricular internal cavity size.   2. Left ventricular ejection fraction, by visual estimation, is 55 to 60%.   3. Normal global left ventricular systolic function.   4. Spectral Doppler shows pseudonormal pattern of left ventricular myocardial filling (Grade II diastolic dysfunction).   5. Normal right ventricular size and function.   6. Severely enlarged left atrium.   7. Severely enlarged right atrium.   8. Mild thickening of the anterior and posterior mitral valve leaflets.   9. Severe mitral annular calcification.  10.Moderate mitral valve regurgitation.  11. Severe tricuspid regurgitation.  12. Estimated pulmonary artery systolic pressure is 57.5 mmHg assuming a right atrial pressure of 15 mmHg, which is consistent with moderate pulmonary hypertension.  13. Severe aortic valve stenosis.  14. Mild to moderate aortic regurgitation.  15. Peak transaortic gradient equals 66.3 mmHg, mean transaortic gradient equals 35.8 mmHg, the calculated aortic valve area equals 0.63 cm² by the continuity equation consistent with severe aortic stenosis.  16. The Dimesionless Index value is 0.21.  17. There is no evidence of pericardial effusion.    < end of copied text >  
[facility]                [address] [address]                                     PHONE: [phone]                                       FAX: [phone]  [email or web address]      Reason for Consult: fall?    HPI:  Patient is a hearing impaired 89 y/o woman with chronic HFpEF, moderate pHTN, severe TR, severe MAC, moderate AS, hypertension who was last admitted for PNA and cellulitis who presents after found down. Patient denies loss of consciousness and reports tripping on her phone cord with an inability to get off the floor. She denies chest pain and palpitations. In the ER with BNP elevation and well as mild troponin and CPK elevation.     PAST MEDICAL & SURGICAL HISTORY:  Coronary artery disease involving native coronary artery of native heart without angina pectoris  CHF (congestive heart failure)  History of cataract extraction        PREVIOUS DIAGNOSTIC TESTING:      EKG: < from: 12 Lead ECG (12.23.20 @ 08:50) >  Atrial fibrillation  Minimal voltage criteria for LVH, may be normal variant  Nonspecific ST and T wave abnormality  Abnormal ECG    ECHO FINDINGS:   tt< from: TTE Echo Complete w/o Doppler (04.04.20 @ 11:56) >   1. Left ventricular ejection fraction, by visual estimation, is 60 to 65%.   2. Normal global left ventricular systolic function.   3. Spectral Doppler shows impaired relaxation pattern of left ventricular myocardial filling (Grade I diastolic dysfunction).   4. Mild thickening of the anterior and posterior mitral valve leaflets.   5. Mild to moderate mitral valve regurgitation.   6. Severe mitral annular calcification.   7. Severe tricuspid regurgitation.   8. Mild to moderate aortic regurgitation.   9. Estimated pulmonary artery systolic pressure is 56.3 mmHg assuming a right atrial pressure of 3 mmHg, which is consistent with moderate pulmonary hypertension.  10. The aortic valve mean gradient is 23.4 mmHg consistent with moderate aortic stenosis.  11. The Dimesionless Index value is .30.    MEDICATIONS  (STANDING):  ascorbic acid 500 milliGRAM(s) Oral daily  aspirin  chewable 81 milliGRAM(s) Oral daily  azithromycin  IVPB 500 milliGRAM(s) IV Intermittent every 24 hours  cefTRIAXone   IVPB 1000 milliGRAM(s) IV Intermittent every 24 hours  enoxaparin Injectable 40 milliGRAM(s) SubCutaneous daily  ferrous    sulfate 325 milliGRAM(s) Oral daily  folic acid 1 milliGRAM(s) Oral daily  multivitamin 1 Tablet(s) Oral daily  saccharomyces boulardii 250 milliGRAM(s) Oral two times a day  sodium chloride 0.9%. 1000 milliLiter(s) (100 mL/Hr) IV Continuous <Continuous>  thiamine 100 milliGRAM(s) Oral daily    MEDICATIONS  (PRN):  acetaminophen   Tablet .. 650 milliGRAM(s) Oral every 6 hours PRN Temp greater or equal to 38C (100.4F), Mild Pain (1 - 3), Moderate Pain (4 - 6)  haloperidol    Injectable 1 milliGRAM(s) IntraMuscular every 6 hours PRN Agitation  ondansetron Injectable 4 milliGRAM(s) IV Push every 6 hours PRN Nausea and/or Vomiting      FAMILY HISTORY:  No pertinent family history in first degree relatives    SOCIAL HISTORY:  CIGARETTES: denies   ALCOHOL: denies     ROS: Negative other than as mentioned in HPI.    Vital Signs Last 24 Hrs  T(C): 36.3 (24 Dec 2020 04:26), Max: 36.6 (23 Dec 2020 17:37)  T(F): 97.4 (24 Dec 2020 04:26), Max: 97.8 (23 Dec 2020 17:37)  HR: 76 (24 Dec 2020 04:26) (60 - 86)  BP: 104/52 (24 Dec 2020 04:26) (99/54 - 140/60)  BP(mean): 70 (24 Dec 2020 04:26) (70 - 72)  RR: 18 (24 Dec 2020 04:26) (18 - 20)  SpO2: 96% (24 Dec 2020 04:26) (92% - 99%)    PHYSICAL EXAM:  General: Appears well developed, well nourished alert and cooperative.  HEENT: Head; normocephalic, atraumatic. sclera anicteric, MM very dry, hearing impaired   CARDIOVASCULAR; 2/6 NIDIA, no rub, gallop or lift. Normal S1 and S2.  LUNGS; No rales, rhonchi or wheeze. Normal breath sounds bilaterally.  ABDOMEN ; Soft, nontender without mass or organomegaly. bowel sounds normoactive.  EXTREMITIES; No clubbing, cyanosis (+) edema and ecchymoses   SKIN; warm and dry with normal turgor.  NEURO; Alert/oriented x 2/normal motor exam.     PSYCH; very hard of hearing         I&O's Detail      LABS:                        14.3   18.56 )-----------( 243      ( 23 Dec 2020 08:16 )             46.0     12-23    138  |  101  |  29.0<H>  ----------------------------<  100<H>  4.5   |  20.0<L>  |  0.86    Ca    9.2      23 Dec 2020 08:16  Phos  2.8     12-23  Mg     2.1     12-23    TPro  8.5  /  Alb  3.3  /  TBili  0.7  /  DBili  x   /  AST  132<H>  /  ALT  43<H>  /  AlkPhos  137<H>  12-23    CARDIAC MARKERS ( 23 Dec 2020 08:16 )  x     / 0.07 ng/mL / 2830 U/L / x     / 76.0 ng/mL      PT/INR - ( 23 Dec 2020 08:16 )   PT: 14.5 sec;   INR: 1.26 ratio         PTT - ( 23 Dec 2020 08:16 )  PTT:27.7 sec    I&O's Summary      RADIOLOGY & ADDITIONAL STUDIES:
VASCULAR SURGERY CONSULT     HPI: Patient is a 89 y/o woman with chronic HFpEF, moderate pHTN, severe TR, severe MAC, moderate AS, hypertension who was last admitted for PNA and cellulitis who presents after found down, unknown time down. Patient denies loss of consciousness and reports tripping on her phone cord with an inability to get off the floor. She denies chest pain and palpitations. In the ER with BNP elevation and well as mild troponin and CPK elevation.     Vascular surgery was consulted today for evaluation of her b/l LE venous stasis ulcers. Primary team states that on admission she appeared to have unna boots in place which was evident by markings in her legs. Patient states that her legs have been getting wrapped for the past 4 years and have significantly improved.       ROS: 10-system review is otherwise negative except HPI above.      PAST MEDICAL & SURGICAL HISTORY:  Coronary artery disease involving native coronary artery of native heart without angina pectoris  CHF (congestive heart failure)  History of cataract extraction  LE venous stasis ulcers      FAMILY HISTORY:  No pertinent family history in first degree relatives      Family history not pertinent as reviewed with the patient.    SOCIAL HISTORY:  Denies any toxic habits    ALLERGIES: NKA No Known Allergies      HOME MEDICATIONS:   acetaminophen 325 mg oral tablet: 2 tab(s) orally every 6 hours, As needed, Temp greater or equal to 38C (100.4F), Mild Pain (1 - 3) (05 Apr 2020 14:21)  ascorbic acid 500 mg oral tablet: 1 tab(s) orally once a day (05 Apr 2020 14:21)  aspirin 81 mg oral tablet, chewable: 1 tab(s) orally once a day (05 Apr 2020 14:21)  ferrous sulfate 325 mg (65 mg elemental iron) oral tablet: 1 tab(s) orally once a day (23 Dec 2020 11:37)  folic acid: 1 milligram(s) orally once a day (19 May 2017 21:08)  furosemide 40 mg oral tablet: 1 tab(s) orally once a day (23 Dec 2020 11:43)  Multiple Vitamins oral tablet: 1 tab(s) orally once a day (05 Apr 2020 14:21)  thiamine 100 mg oral tablet: 1 tab(s) orally once a day (05 Apr 2020 14:21)      --------------------------------------------------------------------------------------------    PHYSICAL EXAM:   General: NAD, Lying in bed comfortably  Neuro: A+Ox3  HEENT: EOMI, PERRLA, MMM  Cardio: RRR  Resp: Non labored breathing on 2L NC  GI/Abd: Soft, NT/ND, no rebound/guarding, no masses palpated  Vascular: All 4 extremities warm and well perfused.   Pelvis: stable  Musculoskeletal: b/l LE edema. 2 LLE venous stasis ulcers measuring 2cm each on anterior shin. Doppler DP signals b/l.  --------------------------------------------------------------------------------------------    LABS                 11.4   10.65  )----------(  242       ( 24 Dec 2020 09:22 )               37.4      139    |  105    |  33.0   ----------------------------<  71         ( 24 Dec 2020 09:22 )  4.1     |  21.0   |  0.97     Ca    8.6        ( 24 Dec 2020 09:22 )  Phos  3.1       ( 24 Dec 2020 09:22 )  Mg     2.1       ( 24 Dec 2020 09:22 )    TPro  7.1    /  Alb  2.9    /  TBili  0.3    /  DBili  x      /  AST  188    /  ALT  63     /  AlkPhos  112    ( 24 Dec 2020 09:22 )    LIVER FUNCTIONS - ( 24 Dec 2020 09:22 )  Alb: 2.9 g/dL / Pro: 7.1 g/dL / ALK PHOS: 112 U/L / ALT: 63 U/L / AST: 188 U/L / GGT: x               CAPILLARY BLOOD GLUCOSE    CARDIAC MARKERS ( 24 Dec 2020 09:22 )  x     / x     / 1739 U/L / x     / 43.3 ng/mL          08:54 - VBG - pH:       | pCO2:       | pO2:       | Lactate: 1.5      --------------------------------------------------------------------------------------------  IMAGING  CT head/neck: negative for traumatic injury.     ASSESSMENT: Patient is a 89y old female admitted after being found down with increased CPKs and lactate. Vascular surgery consulted for b/l LE edema and LLE venous stasis ulcers. LE compression dressings applied.     PLAN:    - Daily b/l LE compression dressing changes per nursing or primary team. Xeroform over both LLE venous stasis ulcer, 4x4 gauze covering Xeroform, Kerlix wrap over, and ACE wrap on top.  - Please obtain LE US to r/o DVTs.  - Vascular surgery will place unna boots on day of discharge  - Rest of care per primary team  - Plan discussed with Attending, Dr. Marcus
Palliative Consult  HPI This is a 89 year old female PMHx falls, thoracic Compression Fractures, Diastolic Heart Failure, Severe TR, LE Edema, HTN and [place] brought by EMS after she was found on the floor. She was recently treated for Pneumonia and Cellulitis in Lower Extremities. In ER, she was hypothermic, no obvious injury was found. Labs showed leukocytosis, elevated lactate / CPK / LFTs / BNP. Troponin mildly elevated. She was given NS 1500 ml. CT Head and C. Spine without any acute findings except patchy ground glass opacities in the bilateral upper lungs. No reports of chest pain, SOB, fever, chills, n/v/d. Found to have LLE cellulitis - strep bacteremia started on IV abx by ID until 1/8/21. Patient's mental status now improving - likely due to infection - CT head negative. Echo significant for preserved EF, severe TR and AS (cardiology following). Patient now with dysphagia speech and swallow eval recommendations for NPO and re-evaluation. Palliative consult to discuss comfort feeds vs PEG with patient's surrogate, daughter. See palliative care encounter below for details.     <HPI:  History was taken from daughter as patient is restless/agitated at the time and is very hard of hearing.   89 years old female with PMH of Recurrent Falls, Thoracic Compression Fractures, Diastolic Heart Failure, Severe TR, LE Edema, HTN and [place] brought by EMS after she was found on the floor. As per daughter, patient lives alone and uses walker for ambulation. Daughter visits her three times a week and on Monday she was doing well. Today, when she went to see her, she was found on kitchen floor. She was alert and awake and was not complaining of anything. No obvious injury. She could not pick her up so she called EMS. As per daughter, she must have fallen between yesterday late afternoon to this morning as she had picked up mail yesterday afternoon. She was recently treated for Pneumonia and Cellulitis in Lower Extremities.   In ER, she was hypothermic, no obvious injury was found. Labs showed leukocytosis, elevated lactate / CPK / LFTs / BNP. Troponin mildly elevated. She was given NS 1500 ml. CT Head and C. Spine without any acute findings except patchy ground glass opacities in the bilateral upper lungs. (23 Dec 2020 11:41)> end of copied text    PERTINENT PMH REVIEWED: Yes     PAST MEDICAL & SURGICAL HISTORY:  Coronary artery disease involving native coronary artery of native heart without angina pectoris    CHF (congestive heart failure)    History of cataract extraction        SOCIAL HISTORY:                                     Admitted from:  home      Surrogate/HCP/Guardian: Phone#: Arianna Oneil daughter [phone]    FAMILY HISTORY:  No family history related to admission diagonsis    Baseline ADLs (prior to admission):  Dependent      Allergies  No Known Allergies    Present Symptoms:   Dyspnea: 0   Nausea/Vomiting: No  Anxiety:  No  Depression: No  Fatigue: No  Loss of appetite: No    Pain: no pain            Character-            Duration-            Effect-            Factors-            Frequency-            Location-            Severity-    Review of Systems: Reviewed  Per HPI, all other ROS negative    MEDICATIONS  (STANDING):  cefTRIAXone   IVPB 2000 milliGRAM(s) IV Intermittent every 24 hours  dextrose 5% + sodium chloride 0.45%. 1000 milliLiter(s) (50 mL/Hr) IV Continuous <Continuous>  diltiazem Injectable 5 milliGRAM(s) IV Push every 6 hours  enoxaparin Injectable 40 milliGRAM(s) SubCutaneous daily    MEDICATIONS  (PRN):  acetaminophen   Tablet .. 650 milliGRAM(s) Oral every 6 hours PRN Temp greater or equal to 38C (100.4F), Mild Pain (1 - 3), Moderate Pain (4 - 6)  diltiazem Injectable 5 milliGRAM(s) IV Push every 4 hours PRN HR>130 sustained  haloperidol    Injectable 1 milliGRAM(s) IntraMuscular every 6 hours PRN Agitation  ondansetron Injectable 4 milliGRAM(s) IV Push every 6 hours PRN Nausea and/or Vomiting      Vital Signs Last 24 Hrs  T(C): 36.5 (29 Dec 2020 04:20), Max: 36.5 (29 Dec 2020 04:20)  T(F): 97.7 (29 Dec 2020 04:20), Max: 97.7 (29 Dec 2020 04:20)  HR: 88 (29 Dec 2020 11:48) (77 - 115)  BP: 110/84 (29 Dec 2020 11:48) (108/71 - 127/71)  BP(mean): --  RR: 19 (29 Dec 2020 11:48) (18 - 19)  SpO2: 99% (29 Dec 2020 13:21) (96% - 99%)    General: alert  oriented x _3    Karnofsky:  %__30    HEENT: normal      Lungs: comfortable    CV: normal      GI: normal     : incontinent      MSK: weakness, cellulitis LLE    Skin: cellulitis , reddened skin LLE    I&O's Summary    28 Dec 2020 07:01  -  29 Dec 2020 07:00  --------------------------------------------------------  IN: 650 mL / OUT: 200 mL / NET: 450 mL    RADIOLOGY & ADDITIONAL STUDIES:    ECHO 12.24.20    Summary:   1. Normal left ventricular internal cavity size.   2. Left ventricular ejection fraction, by visual estimation, is 55 to 60%.   3. Normal global left ventricular systolic function.   4. Spectral Doppler shows pseudonormal pattern of left ventricular myocardial filling (Grade II diastolic dysfunction).   5. Normal right ventricular size and function.   6. Severely enlarged left atrium.   7. Severely enlarged right atrium.   8. Mild thickening of the anterior and posterior mitral valve leaflets.   9. Severe mitral annular calcification.  10.Moderate mitral valve regurgitation.  11. Severe tricuspid regurgitation.  12. Estimated pulmonary artery systolic pressure is 57.5 mmHg assuming a right atrial pressure of 15 mmHg, which is consistent with moderate pulmonary hypertension.  13. Severe aortic valve stenosis.  14. Mild to moderate aortic regurgitation.  15. Peak transaortic gradient equals 66.3 mmHg, mean transaortic gradient equals 35.8 mmHg, the calculated aortic valve area equals 0.63 cm² by the continuity equation consistent with severe aortic stenosis.  16. The Dimesionless Index value is 0.21.  17. There is no evidence of pericardial effusion.    12.23.20 CT head  IMPRESSION:    CT head: No acute intracranial hemorrhage or mass effect.    CT cervical spine:  1.  No evidencefor acute displaced fracture or malalignment.  2.  Patchy groundglass opacities in the bilateral upper lungs.    ADVANCE DIRECTIVES:   DNR DNI MOLST

## 2020-12-29 NOTE — CONSULT NOTE ADULT - PROBLEM SELECTOR RECOMMENDATION 2
Swallow eval - repeat  Discussed PEG vs Pleasurefeeds/comfort feeds  DaughterRose (surrogate) leaning towards comfort feeds

## 2020-12-29 NOTE — CONSULT NOTE ADULT - ASSESSMENT
This is a 89 year old s/p fall Found to have LLE cellulitis - strep bacteremia started on IV abx by ID until 1/8/21. Patient's mental status now improving - likely due to infection - CT head negative. Echo significant for preserved EF, severe TR and AS (cardiology following). Patient now with dysphagia speech and swallow eval recommendations for NPO and re-evaluation. Palliative consult to discuss comfort feeds vs PEG with patient's surrogate, daughter. See palliative care encounter below for details.

## 2020-12-29 NOTE — PROGRESS NOTE ADULT - ASSESSMENT
89 years old female with PMH of Recurrent Falls, Thoracic Compression Fractures, Diastolic Heart Failure, Severe TR, LE Edema, HTN and [place] brought by EMS after she was found on the floor. As per daughter, patient lives alone and uses walker for ambulation. Daughter visits her three times a week and on Monday she was doing well. Today, when she went to see her, she was found on kitchen floor. She was alert and awake and was not complaining of anything. No obvious injury. She could not pick her up so she called EMS. As per daughter, she must have fallen between yesterday late afternoon to this morning as she had picked up mail yesterday afternoon. She was recently treated for Pneumonia and Cellulitis in Lower Extremities.   In ER, she was hypothermic, no obvious injury was found. Labs showed leukocytosis, elevated lactate / CPK / LFTs / BNP. Troponin mildly elevated. She was given NS 1500 ml. CT Head and C. Spine without any acute findings except patchy ground glass opacities in the bilateral upper lungs.     strep bacteremia, LLE cellulitis     ceftriaxone until 1/8/21 per ID  via midline    LE duplex negative    acute metabolic encephalopathy: likely due to infection, ?baseline dementia ---intermittent     monitor, avoid sedatives    CT head negative for acute pathology    rhabdo: resolved    AFib with RVR: echo preserved EF, severe TR, severe AS    low dose diltiazem. cardiology following.    no a/c per cardio    while NPO dilt IV ATC and PRN    Recurrent Falls  - PT Eval recommends JOCELYNN vs 24/7 assist at home    elevated Troponin    likely demand    Chronic Diastolic Heart Failure  - Does not look in volume overload  - restart lasix when taking po.     dysphagia: speech recommending NPO status    await re eval; low dose IV fluids.    GOC: DNR/I.    DVT Prophylaxis -- Lovenox 40 mg SQ    palliative consulted

## 2020-12-30 LAB
ANION GAP SERPL CALC-SCNC: 8 MMOL/L — SIGNIFICANT CHANGE UP (ref 5–17)
APPEARANCE UR: CLEAR — SIGNIFICANT CHANGE UP
BACTERIA # UR AUTO: NEGATIVE — SIGNIFICANT CHANGE UP
BILIRUB UR-MCNC: NEGATIVE — SIGNIFICANT CHANGE UP
BUN SERPL-MCNC: 17 MG/DL — SIGNIFICANT CHANGE UP (ref 8–20)
CALCIUM SERPL-MCNC: 8.5 MG/DL — LOW (ref 8.6–10.2)
CHLORIDE SERPL-SCNC: 101 MMOL/L — SIGNIFICANT CHANGE UP (ref 98–107)
CO2 SERPL-SCNC: 31 MMOL/L — HIGH (ref 22–29)
COLOR SPEC: YELLOW — SIGNIFICANT CHANGE UP
CREAT SERPL-MCNC: 0.71 MG/DL — SIGNIFICANT CHANGE UP (ref 0.5–1.3)
DIFF PNL FLD: NEGATIVE — SIGNIFICANT CHANGE UP
EPI CELLS # UR: SIGNIFICANT CHANGE UP
GLUCOSE SERPL-MCNC: 112 MG/DL — HIGH (ref 70–99)
GLUCOSE UR QL: NEGATIVE MG/DL — SIGNIFICANT CHANGE UP
HCT VFR BLD CALC: 39.6 % — SIGNIFICANT CHANGE UP (ref 34.5–45)
HGB BLD-MCNC: 11.3 G/DL — LOW (ref 11.5–15.5)
KETONES UR-MCNC: NEGATIVE — SIGNIFICANT CHANGE UP
LEUKOCYTE ESTERASE UR-ACNC: NEGATIVE — SIGNIFICANT CHANGE UP
MAGNESIUM SERPL-MCNC: 2.3 MG/DL — SIGNIFICANT CHANGE UP (ref 1.6–2.6)
MCHC RBC-ENTMCNC: 26.7 PG — LOW (ref 27–34)
MCHC RBC-ENTMCNC: 28.5 GM/DL — LOW (ref 32–36)
MCV RBC AUTO: 93.6 FL — SIGNIFICANT CHANGE UP (ref 80–100)
NITRITE UR-MCNC: NEGATIVE — SIGNIFICANT CHANGE UP
PH UR: 6 — SIGNIFICANT CHANGE UP (ref 5–8)
PHOSPHATE SERPL-MCNC: 2.4 MG/DL — SIGNIFICANT CHANGE UP (ref 2.4–4.7)
PLATELET # BLD AUTO: 221 K/UL — SIGNIFICANT CHANGE UP (ref 150–400)
POTASSIUM SERPL-MCNC: 4.6 MMOL/L — SIGNIFICANT CHANGE UP (ref 3.5–5.3)
POTASSIUM SERPL-SCNC: 4.6 MMOL/L — SIGNIFICANT CHANGE UP (ref 3.5–5.3)
PROT UR-MCNC: 15 MG/DL
RBC # BLD: 4.23 M/UL — SIGNIFICANT CHANGE UP (ref 3.8–5.2)
RBC # FLD: 16.5 % — HIGH (ref 10.3–14.5)
RBC CASTS # UR COMP ASSIST: NEGATIVE /HPF — SIGNIFICANT CHANGE UP (ref 0–4)
SODIUM SERPL-SCNC: 140 MMOL/L — SIGNIFICANT CHANGE UP (ref 135–145)
SP GR SPEC: 1.01 — SIGNIFICANT CHANGE UP (ref 1.01–1.02)
UROBILINOGEN FLD QL: NEGATIVE MG/DL — SIGNIFICANT CHANGE UP
WBC # BLD: 4.9 K/UL — SIGNIFICANT CHANGE UP (ref 3.8–10.5)
WBC # FLD AUTO: 4.9 K/UL — SIGNIFICANT CHANGE UP (ref 3.8–10.5)
WBC UR QL: NEGATIVE — SIGNIFICANT CHANGE UP

## 2020-12-30 PROCEDURE — 99232 SBSQ HOSP IP/OBS MODERATE 35: CPT

## 2020-12-30 PROCEDURE — 99233 SBSQ HOSP IP/OBS HIGH 50: CPT

## 2020-12-30 PROCEDURE — 71045 X-RAY EXAM CHEST 1 VIEW: CPT | Mod: 26

## 2020-12-30 RX ORDER — DILTIAZEM HCL 120 MG
10 CAPSULE, EXT RELEASE 24 HR ORAL EVERY 6 HOURS
Refills: 0 | Status: DISCONTINUED | OUTPATIENT
Start: 2020-12-30 | End: 2020-12-30

## 2020-12-30 RX ORDER — FUROSEMIDE 40 MG
20 TABLET ORAL ONCE
Refills: 0 | Status: COMPLETED | OUTPATIENT
Start: 2020-12-30 | End: 2020-12-30

## 2020-12-30 RX ORDER — FUROSEMIDE 40 MG
40 TABLET ORAL DAILY
Refills: 0 | Status: DISCONTINUED | OUTPATIENT
Start: 2020-12-31 | End: 2021-01-03

## 2020-12-30 RX ORDER — FERROUS SULFATE 325(65) MG
1 TABLET ORAL
Qty: 0 | Refills: 0 | DISCHARGE

## 2020-12-30 RX ORDER — MUPIROCIN 20 MG/G
1 OINTMENT TOPICAL EVERY 12 HOURS
Refills: 0 | Status: DISCONTINUED | OUTPATIENT
Start: 2020-12-30 | End: 2021-01-03

## 2020-12-30 RX ORDER — DILTIAZEM HCL 120 MG
30 CAPSULE, EXT RELEASE 24 HR ORAL
Refills: 0 | Status: DISCONTINUED | OUTPATIENT
Start: 2020-12-30 | End: 2020-12-31

## 2020-12-30 RX ADMIN — Medication 5 MILLIGRAM(S): at 05:29

## 2020-12-30 RX ADMIN — Medication 30 MILLIGRAM(S): at 15:38

## 2020-12-30 RX ADMIN — Medication 5 MILLIGRAM(S): at 12:14

## 2020-12-30 RX ADMIN — ENOXAPARIN SODIUM 40 MILLIGRAM(S): 100 INJECTION SUBCUTANEOUS at 12:15

## 2020-12-30 RX ADMIN — Medication 30 MILLIGRAM(S): at 19:26

## 2020-12-30 RX ADMIN — CEFTRIAXONE 100 MILLIGRAM(S): 500 INJECTION, POWDER, FOR SOLUTION INTRAMUSCULAR; INTRAVENOUS at 15:38

## 2020-12-30 RX ADMIN — MUPIROCIN 1 APPLICATION(S): 20 OINTMENT TOPICAL at 17:15

## 2020-12-30 RX ADMIN — Medication 20 MILLIGRAM(S): at 10:39

## 2020-12-30 NOTE — PROGRESS NOTE ADULT - SUBJECTIVE AND OBJECTIVE BOX
CC: Fall (30 Dec 2020 14:42)    HPI:  89 years old female with PMH of Recurrent Falls, Thoracic Compression Fractures, Diastolic Heart Failure, Severe TR, LE Edema, HTN and [place] brought by EMS after she was found on the floor.     INTERVAL HPI/OVERNIGHT EVENTS: Patient seen and examined lying in bed.  Patient denies any headache, dizziness, SOB, CP, abdominal pain, nausea, vomiting.  Per RN, retaining urine, requiring straight cath with >600 mL.  Other ROS reviewed and are negative.    Vital Signs Last 24 Hrs  T(C): 36.3 (30 Dec 2020 10:49), Max: 36.3 (29 Dec 2020 22:36)  T(F): 97.4 (30 Dec 2020 10:49), Max: 97.4 (30 Dec 2020 10:49)  HR: 128 (30 Dec 2020 12:19) (103 - 128)  BP: 122/76 (30 Dec 2020 12:19) (112/64 - 129/77)  BP(mean): --  RR: 20 (30 Dec 2020 10:49) (18 - 20)  SpO2: 94% (30 Dec 2020 10:49) (94% - 100%)  I&O's Detail    29 Dec 2020 07:01  -  30 Dec 2020 07:00  --------------------------------------------------------  IN:    dextrose 5% + sodium chloride 0.45%: 650 mL  Total IN: 650 mL    OUT:    Voided (mL): 200 mL  Total OUT: 200 mL    Total NET: 450 mL      30 Dec 2020 07:01  -  30 Dec 2020 15:34  --------------------------------------------------------  IN:  Total IN: 0 mL    OUT:    Intermittent Catheterization - Urethral (mL): 750 mL    Voided (mL): 400 mL  Total OUT: 1150 mL    Total NET: -1150 mL      PHYSICAL EXAM:  GENERAL: NAD  HEAD:  Atraumatic, Normocephalic  NECK: Supple, No JVD, Normal thyroid  NERVOUS SYSTEM:  Alert & Oriented X3, forgetful, Good concentration; generalized weakness  CHEST/LUNG: Bibasilar crackles  HEART: Regular rate and rhythm; No murmurs, rubs, or gallops  ABDOMEN: Soft, Nontender, Nondistended; Bowel sounds present  EXTREMITIES:  2+ Peripheral Pulses, No clubbing, cyanosis, or edema; LLE with nickel sized wound                                 11.3   4.90  )-----------( 221      ( 30 Dec 2020 06:33 )             39.6     30 Dec 2020 06:33    140    |  101    |  17.0   ----------------------------<  112    4.6     |  31.0   |  0.71     Ca    8.5        30 Dec 2020 06:33  Phos  2.4       30 Dec 2020 06:33  Mg     2.3       30 Dec 2020 06:33      Urinalysis Basic - ( 30 Dec 2020 12:36 )    Color: Yellow / Appearance: Clear / S.010 / pH: x  Gluc: x / Ketone: Negative  / Bili: Negative / Urobili: Negative mg/dL   Blood: x / Protein: 15 mg/dL / Nitrite: Negative   Leuk Esterase: Negative / RBC: Negative /HPF / WBC Negative   Sq Epi: x / Non Sq Epi: Occasional / Bacteria: Negative        MEDICATIONS  (STANDING):  cefTRIAXone   IVPB 2000 milliGRAM(s) IV Intermittent every 24 hours  diltiazem    Tablet 30 milliGRAM(s) Oral four times a day  enoxaparin Injectable 40 milliGRAM(s) SubCutaneous daily  mupirocin 2% Ointment 1 Application(s) Topical every 12 hours    MEDICATIONS  (PRN):  acetaminophen   Tablet .. 650 milliGRAM(s) Oral every 6 hours PRN Temp greater or equal to 38C (100.4F), Mild Pain (1 - 3), Moderate Pain (4 - 6)  bisacodyl Suppository 10 milliGRAM(s) Rectal every 24 hours PRN Constipation  diltiazem Injectable 5 milliGRAM(s) IV Push every 4 hours PRN HR>130 sustained  haloperidol    Injectable 1 milliGRAM(s) IntraMuscular every 6 hours PRN Agitation  ondansetron Injectable 4 milliGRAM(s) IV Push every 6 hours PRN Nausea and/or Vomiting      RADIOLOGY & ADDITIONAL TESTS:  < from: Xray Chest 1 View- PORTABLE-Urgent (Xray Chest 1 View- PORTABLE-Urgent .) (12.30.20 @ 15:21) >   EXAM:  XR CHEST PORTABLE URGENT 1V                          PROCEDURE DATE:  2020          INTERPRETATION:  Portable chest radiograph    CLINICAL INFORMATION: Dyspnea, shortness of breath.    TECHNIQUE:  Portable  AP view of the chest was obtained.    COMPARISON: 2017 chest available for review.    FINDINGS:  The lungs show moderate RIGHT pleural effusion and/or basilar airspace consolidation. A mild LEFT effusion.    The  heart is enlarged in transverse diameter. No hilar mass.  . No pneumothorax.      Visualized osseous structures are intact.        IMPRESSION:   Moderate RIGHT pleural effusion and/or basilar airspace consolidation. Mild LEFT pleural effusion. Cardiomegaly.              ROLAN SOTO MD; Attending Radiologist  This document has been electronically signed. Dec 30 2020  3:34PM    < end of copied text >

## 2020-12-30 NOTE — PROGRESS NOTE ADULT - ASSESSMENT
89 years old female with PMH of Recurrent Falls, Thoracic Compression Fractures, Diastolic Heart Failure, Severe TR, LE Edema, HTN and [place] brought by EMS after she was found on the floor. As per daughter, patient lives alone and uses walker for ambulation. Daughter visits her three times a week and on Monday she was doing well. When she went to see her, she was found on kitchen floor. She was alert and awake and was not complaining of anything. No obvious injury. She could not pick her up so she called EMS.  She was recently treated for Pneumonia and Cellulitis in Lower Extremities.   In ER, she was hypothermic, no obvious injury was found. Labs showed leukocytosis, elevated lactate / CPK / LFTs / BNP. Troponin mildly elevated. She was given NS 1500 ml. CT Head and C. Spine without any acute findings except patchy ground glass opacities in the bilateral upper lungs.     strep bacteremia, LLE cellulitis     ceftriaxone until 1/8/21 per ID  via midline    LE duplex negative    acute metabolic encephalopathy: likely due to infection, ?baseline dementia ---intermittent     monitor, avoid sedatives    CT head negative for acute pathology    rhabdo: resolved    AFib with RVR: echo preserved EF, severe TR, severe AS    low dose diltiazem. cardiology following.    no a/c per cardio    Recurrent Falls  - PT Eval recommends JOCELYNN vs 24/7 assist at home    elevated Troponin    likely demand    Chronic Diastolic Heart Failure  - Volume overload this am - Lasix 20mg IV x1 today    dysphagia: speech f/u - advanced to puree with nectar    GOC: DNR/I.    DVT Prophylaxis -- Lovenox 40 mg SQ    palliative consulted - Daughter agreeable to home hospice.  Referred to hospice. 89 years old female with PMH of Recurrent Falls, Thoracic Compression Fractures, Diastolic Heart Failure, Severe TR, LE Edema, HTN and [place] brought by EMS after she was found on the floor. As per daughter, patient lives alone and uses walker for ambulation. Daughter visits her three times a week and on Monday she was doing well. When she went to see her, she was found on kitchen floor. She was alert and awake and was not complaining of anything. No obvious injury. She could not pick her up so she called EMS.  She was recently treated for Pneumonia and Cellulitis in Lower Extremities.   In ER, she was hypothermic, no obvious injury was found. Labs showed leukocytosis, elevated lactate / CPK / LFTs / BNP. Troponin mildly elevated. She was given NS 1500 ml. CT Head and C. Spine without any acute findings except patchy ground glass opacities in the bilateral upper lungs.     strep bacteremia, LLE cellulitis     ceftriaxone until 1/8/21 per ID  via midline    LE duplex negative    acute metabolic encephalopathy: likely due to infection, ?baseline dementia ---intermittent     monitor, avoid sedatives    CT head negative for acute pathology    rhabdo: resolved    AFib with RVR: echo preserved EF, severe TR, severe AS    low dose diltiazem. cardiology following.    no a/c per cardio    Recurrent Falls  - PT Eval recommends JOCELYNN vs 24/7 assist at home    elevated Troponin    likely demand    Chronic Diastolic Heart Failure  - Volume overload this am - Lasix 20mg IV x1 today    dysphagia: speech f/u - advanced to puree with nectar    GOC: DNR/I.    DVT Prophylaxis -- Lovenox 40 mg SQ    palliative consulted - Daughter agreeable to home hospice.  Referred to hospice.  Discussed with Arianna, daughter, and updated.

## 2020-12-30 NOTE — CHART NOTE - NSCHARTNOTEFT_GEN_A_CORE
Source: Patient [ ]  Family [ ]   other [x] EMR    Current Diet: NPO    Current Weight:   No weight documented     Pertinent Medications: MEDICATIONS  (STANDING):  cefTRIAXone   IVPB 2000 milliGRAM(s) IV Intermittent every 24 hours  dextrose 5% + sodium chloride 0.45%. 1000 milliLiter(s) (50 mL/Hr) IV Continuous <Continuous>  diltiazem Injectable 5 milliGRAM(s) IV Push every 6 hours  enoxaparin Injectable 40 milliGRAM(s) SubCutaneous daily  furosemide   Injectable 20 milliGRAM(s) IV Push once    MEDICATIONS  (PRN):  acetaminophen   Tablet .. 650 milliGRAM(s) Oral every 6 hours PRN Temp greater or equal to 38C (100.4F), Mild Pain (1 - 3), Moderate Pain (4 - 6)  bisacodyl Suppository 10 milliGRAM(s) Rectal every 24 hours PRN Constipation  diltiazem Injectable 5 milliGRAM(s) IV Push every 4 hours PRN HR>130 sustained  haloperidol    Injectable 1 milliGRAM(s) IntraMuscular every 6 hours PRN Agitation  ondansetron Injectable 4 milliGRAM(s) IV Push every 6 hours PRN Nausea and/or Vomiting    Pertinent Labs: CBC Full  -  ( 30 Dec 2020 06:33 )  WBC Count : 4.90 K/uL  RBC Count : 4.23 M/uL  Hemoglobin : 11.3 g/dL  Hematocrit : 39.6 %  Platelet Count - Automated : 221 K/uL  Mean Cell Volume : 93.6 fl  Mean Cell Hemoglobin : 26.7 pg  Mean Cell Hemoglobin Concentration : 28.5 gm/dL  12-30 Na140 mmol/L Glu 112 mg/dL<H> K+ 4.6 mmol/L Cr  0.71 mg/dL BUN 17.0 mg/dL Phos 2.4 mg/dL Alb n/a   PAB n/a       Skin: suspected DTI L. heel; skin tear R. back, wound L. lower leg,   Edema: 1+ generalized, 2+ L. leg    Nutrition Focused Physical Exam previously conducted: Moderate fat loss (orbital, buccal), moderate muscle wasting (temples, clavicle)    Estimated Needs:   [x] no change since previous assessment  [ ] recalculated:     Current Nutrition Diagnosis: Pt remains at high nutrition risk secondary to Malnutrition (moderate chronic) related to inadequate protein calorie intake as evidenced by PO<75% est needs, physical findings, and skin breakdown. Pt s/p SLP initial and re-eval with continued recommendations to remain NPO with short term means of nutrition/hydration. Aware palliative following to determine further GOC.     Recommendations:   Please obtain new weight as feasible   RD to remain available     Monitoring and Evaluation:   [ ] PO intake [ ] Tolerance to diet prescription [X] Weights  [X] Follow up per protocol [X] Labs:

## 2020-12-30 NOTE — PROGRESS NOTE ADULT - PROBLEM SELECTOR PLAN 4
-Discussed case with Dr. Benjamin  -Call placed to Arianna, patient's daughter/surrogate to see if she has made any decision regarding home hospice services  -Arianna informed me that she decided she wants to go forward with home hospice. Provided Arianna with additional information regarding home hospice services. Arianna stated she plans on moving into the patient's home on discharge so she can be available in the hours hospice is not there.   - Discussed with Georgina Noble RN from [facility] - she is going to call Arianna tomorrow morning some time after 10 am to coordinate consents and to discuss any further questions/concerns she may have regarding hospice services at home.  - Discussed above with Dr. Benjamin  - Code status: DNR DNI  - Will continue to support and monitor    Total Time Spent___25_ minutes  This includes chart review, patient assessment, discussion and collaboration with interdisciplinary team members, ACP planning    Thank you for the opportunity to assist with the care of this patient.   [facility] [phone].

## 2020-12-30 NOTE — PROGRESS NOTE ADULT - NUTRITIONAL ASSESSMENT
This patient has been assessed with a concern for Malnutrition and has been determined to have a diagnosis/diagnoses of Moderate protein-calorie malnutrition.    This patient is being managed with:   Diet DASH/TLC-  Sodium & Cholesterol Restricted  Dysphagia 1 Pureed Nectar Consistency Fluid (DYS1NC)  Entered: Dec 30 2020 10:32AM

## 2020-12-30 NOTE — PROGRESS NOTE ADULT - SUBJECTIVE AND OBJECTIVE BOX
Palliative Followup  OVERNIGHT EVENTS: no acute overnight events - now passed swallow eval - hypothermic earlier today - on iv abx - daughter accepting of home hospice on discharge     CC:fall    Present Symptoms:   Dyspnea: 0   Nausea/Vomiting: No  Anxiety:  No  Depression: No  Fatigue: No  Loss of appetite: No    Pain: no pain            Character-            Duration-            Effect-            Factors-            Frequency-            Location-            Severity-    Review of Systems: Reviewed  Per HPI, all other ROS negative      MEDICATIONS  (STANDING):  cefTRIAXone   IVPB 2000 milliGRAM(s) IV Intermittent every 24 hours  diltiazem    Tablet 30 milliGRAM(s) Oral four times a day  enoxaparin Injectable 40 milliGRAM(s) SubCutaneous daily  mupirocin 2% Ointment 1 Application(s) Topical every 12 hours    MEDICATIONS  (PRN):  acetaminophen   Tablet .. 650 milliGRAM(s) Oral every 6 hours PRN Temp greater or equal to 38C (100.4F), Mild Pain (1 - 3), Moderate Pain (4 - 6)  bisacodyl Suppository 10 milliGRAM(s) Rectal every 24 hours PRN Constipation  diltiazem Injectable 5 milliGRAM(s) IV Push every 4 hours PRN HR>130 sustained  haloperidol    Injectable 1 milliGRAM(s) IntraMuscular every 6 hours PRN Agitation  ondansetron Injectable 4 milliGRAM(s) IV Push every 6 hours PRN Nausea and/or Vomiting      Vital Signs Last 24 Hrs  T(C): 36.3 (30 Dec 2020 10:49), Max: 36.3 (29 Dec 2020 22:36)  T(F): 97.4 (30 Dec 2020 10:49), Max: 97.4 (30 Dec 2020 10:49)  HR: 128 (30 Dec 2020 12:19) (103 - 128)  BP: 122/76 (30 Dec 2020 12:19) (112/64 - 129/77)  BP(mean): --  RR: 20 (30 Dec 2020 10:49) (18 - 20)  SpO2: 94% (30 Dec 2020 10:49) (94% - 100%)    General: alert  oriented x _3    Karn[place]:  %__30    HEENT: normal      Lungs: comfortable    CV: normal      GI: normal     : incontinent      MSK: weakness, cellulitis LLE    Skin: cellulitis , reddened skin LLE      LABS:                          11.3   4.90  )-----------( 221      ( 30 Dec 2020 06:33 )             39.6     1230    140  |  101  |  17.0  ----------------------------<  112<H>  4.6   |  31.0<H>  |  0.71    Ca    8.5<L>      30 Dec 2020 06:33  Phos  2.4     12  Mg     2.3     1230        Urinalysis Basic - ( 30 Dec 2020 12:36 )    Color: Yellow / Appearance: Clear / S.010 / pH: x  Gluc: x / Ketone: Negative  / Bili: Negative / Urobili: Negative mg/dL   Blood: x / Protein: 15 mg/dL / Nitrite: Negative   Leuk Esterase: Negative / RBC: Negative /HPF / WBC Negative   Sq Epi: x / Non Sq Epi: Occasional / Bacteria: Negative      I&O's Summary    29 Dec 2020 07:  -  30 Dec 2020 07:00  --------------------------------------------------------  IN: 650 mL / OUT: 200 mL / NET: 450 mL    30 Dec 2020 07:  -  30 Dec 2020 14:45  --------------------------------------------------------  IN: 0 mL / OUT: 750 mL / NET: -750 mL      RADIOLOGY & ADDITIONAL STUDIES:    ECHO 20    Summary:   1. Normal left ventricular internal cavity size.   2. Left ventricular ejection fraction, by visual estimation, is 55 to 60%.   3. Normal global left ventricular systolic function.   4. Spectral Doppler shows pseudonormal pattern of left ventricular myocardial filling (Grade II diastolic dysfunction).   5. Normal right ventricular size and function.   6. Severely enlarged left atrium.   7. Severely enlarged right atrium.   8. Mild thickening of the anterior and posterior mitral valve leaflets.   9. Severe mitral annular calcification.  10.Moderate mitral valve regurgitation.  11. Severe tricuspid regurgitation.  12. Estimated pulmonary artery systolic pressure is 57.5 mmHg assuming a right atrial pressure of 15 mmHg, which is consistent with moderate pulmonary hypertension.  13. Severe aortic valve stenosis.  14. Mild to moderate aortic regurgitation.  15. Peak transaortic gradient equals 66.3 mmHg, mean transaortic gradient equals 35.8 mmHg, the calculated aortic valve area equals 0.63 cm² by the continuity equation consistent with severe aortic stenosis.  16. The Dimesionless Index value is 0.21.  17. There is no evidence of pericardial effusion.    12.23.20 CT head  IMPRESSION:    CT head: No acute intracranial hemorrhage or mass effect.    CT cervical spine:  1.  No evidencefor acute displaced fracture or malalignment.  2.  Patchy groundglass opacities in the bilateral upper lungs.    ADVANCE DIRECTIVES:   DNR DNI MOLST       Palliative Followup  OVERNIGHT EVENTS: no acute overnight events - now passed swallow eval - hypothermic earlier today - on iv abx - daughter accepting of home hospice on discharge     CC:fall    Present Symptoms:   Dyspnea: 0   Nausea/Vomiting: No  Anxiety:  No  Depression: No  Fatigue: No  Loss of appetite: No    Pain: no pain            Character-            Duration-            Effect-            Factors-            Frequency-            Location-            Severity-    Review of Systems: Reviewed  Per HPI, all other ROS negative      MEDICATIONS  (STANDING):  cefTRIAXone   IVPB 2000 milliGRAM(s) IV Intermittent every 24 hours  diltiazem    Tablet 30 milliGRAM(s) Oral four times a day  enoxaparin Injectable 40 milliGRAM(s) SubCutaneous daily  mupirocin 2% Ointment 1 Application(s) Topical every 12 hours    MEDICATIONS  (PRN):  acetaminophen   Tablet .. 650 milliGRAM(s) Oral every 6 hours PRN Temp greater or equal to 38C (100.4F), Mild Pain (1 - 3), Moderate Pain (4 - 6)  bisacodyl Suppository 10 milliGRAM(s) Rectal every 24 hours PRN Constipation  diltiazem Injectable 5 milliGRAM(s) IV Push every 4 hours PRN HR>130 sustained  haloperidol    Injectable 1 milliGRAM(s) IntraMuscular every 6 hours PRN Agitation  ondansetron Injectable 4 milliGRAM(s) IV Push every 6 hours PRN Nausea and/or Vomiting      Vital Signs Last 24 Hrs  T(C): 36.3 (30 Dec 2020 10:49), Max: 36.3 (29 Dec 2020 22:36)  T(F): 97.4 (30 Dec 2020 10:49), Max: 97.4 (30 Dec 2020 10:49)  HR: 128 (30 Dec 2020 12:19) (103 - 128)  BP: 122/76 (30 Dec 2020 12:19) (112/64 - 129/77)  BP(mean): --  RR: 20 (30 Dec 2020 10:49) (18 - 20)  SpO2: 94% (30 Dec 2020 10:49) (94% - 100%)    General: alert  oriented x _3    [place]:  %__30    HEENT: normal      Lungs: comfortable    CV: normal      GI: passed swallow eval    : incontinent      MSK: weakness, cellulitis LLE    Skin: cellulitis , reddened skin LLE      LABS:                          11.3   4.90  )-----------( 221      ( 30 Dec 2020 06:33 )             39.6     12-30    140  |  101  |  17.0  ----------------------------<  112<H>  4.6   |  31.0<H>  |  0.71    Ca    8.5<L>      30 Dec 2020 06:33  Phos  2.4     1230  Mg     2.3     1230        Urinalysis Basic - ( 30 Dec 2020 12:36 )    Color: Yellow / Appearance: Clear / S.010 / pH: x  Gluc: x / Ketone: Negative  / Bili: Negative / Urobili: Negative mg/dL   Blood: x / Protein: 15 mg/dL / Nitrite: Negative   Leuk Esterase: Negative / RBC: Negative /HPF / WBC Negative   Sq Epi: x / Non Sq Epi: Occasional / Bacteria: Negative      I&O's Summary    29 Dec 2020 07:  -  30 Dec 2020 07:00  --------------------------------------------------------  IN: 650 mL / OUT: 200 mL / NET: 450 mL    30 Dec 2020 07:  -  30 Dec 2020 14:45  --------------------------------------------------------  IN: 0 mL / OUT: 750 mL / NET: -750 mL      RADIOLOGY & ADDITIONAL STUDIES:    ECHO 20    Summary:   1. Normal left ventricular internal cavity size.   2. Left ventricular ejection fraction, by visual estimation, is 55 to 60%.   3. Normal global left ventricular systolic function.   4. Spectral Doppler shows pseudonormal pattern of left ventricular myocardial filling (Grade II diastolic dysfunction).   5. Normal right ventricular size and function.   6. Severely enlarged left atrium.   7. Severely enlarged right atrium.   8. Mild thickening of the anterior and posterior mitral valve leaflets.   9. Severe mitral annular calcification.  10.Moderate mitral valve regurgitation.  11. Severe tricuspid regurgitation.  12. Estimated pulmonary artery systolic pressure is 57.5 mmHg assuming a right atrial pressure of 15 mmHg, which is consistent with moderate pulmonary hypertension.  13. Severe aortic valve stenosis.  14. Mild to moderate aortic regurgitation.  15. Peak transaortic gradient equals 66.3 mmHg, mean transaortic gradient equals 35.8 mmHg, the calculated aortic valve area equals 0.63 cm² by the continuity equation consistent with severe aortic stenosis.  16. The Dimesionless Index value is 0.21.  17. There is no evidence of pericardial effusion.    12.23.20 CT head  IMPRESSION:    CT head: No acute intracranial hemorrhage or mass effect.    CT cervical spine:  1.  No evidencefor acute displaced fracture or malalignment.  2.  Patchy groundglass opacities in the bilateral upper lungs.    ADVANCE DIRECTIVES:   DNR DNI MOLST

## 2020-12-31 LAB
CULTURE RESULTS: NO GROWTH — SIGNIFICANT CHANGE UP
CULTURE RESULTS: SIGNIFICANT CHANGE UP
CULTURE RESULTS: SIGNIFICANT CHANGE UP
SPECIMEN SOURCE: SIGNIFICANT CHANGE UP

## 2020-12-31 PROCEDURE — 99232 SBSQ HOSP IP/OBS MODERATE 35: CPT

## 2020-12-31 RX ORDER — TAMSULOSIN HYDROCHLORIDE 0.4 MG/1
0.4 CAPSULE ORAL AT BEDTIME
Refills: 0 | Status: DISCONTINUED | OUTPATIENT
Start: 2020-12-31 | End: 2021-01-03

## 2020-12-31 RX ORDER — DILTIAZEM HCL 120 MG
60 CAPSULE, EXT RELEASE 24 HR ORAL EVERY 8 HOURS
Refills: 0 | Status: DISCONTINUED | OUTPATIENT
Start: 2020-12-31 | End: 2021-01-01

## 2020-12-31 RX ADMIN — Medication 40 MILLIGRAM(S): at 05:24

## 2020-12-31 RX ADMIN — MUPIROCIN 1 APPLICATION(S): 20 OINTMENT TOPICAL at 05:24

## 2020-12-31 RX ADMIN — Medication 60 MILLIGRAM(S): at 22:47

## 2020-12-31 RX ADMIN — MUPIROCIN 1 APPLICATION(S): 20 OINTMENT TOPICAL at 17:10

## 2020-12-31 RX ADMIN — CEFTRIAXONE 100 MILLIGRAM(S): 500 INJECTION, POWDER, FOR SOLUTION INTRAMUSCULAR; INTRAVENOUS at 13:29

## 2020-12-31 RX ADMIN — TAMSULOSIN HYDROCHLORIDE 0.4 MILLIGRAM(S): 0.4 CAPSULE ORAL at 22:47

## 2020-12-31 RX ADMIN — ENOXAPARIN SODIUM 40 MILLIGRAM(S): 100 INJECTION SUBCUTANEOUS at 13:28

## 2020-12-31 RX ADMIN — Medication 30 MILLIGRAM(S): at 05:24

## 2020-12-31 RX ADMIN — Medication 30 MILLIGRAM(S): at 13:29

## 2020-12-31 NOTE — PROGRESS NOTE ADULT - SUBJECTIVE AND OBJECTIVE BOX
Palliative Followup  OVERNIGHT EVENTS: no acute overnight events -daughter accepting of home hospice on discharge     CC:fall    Present Symptoms:   Dyspnea: 0   Nausea/Vomiting: No  Anxiety:  No  Depression: No  Fatigue: No  Loss of appetite: No    Pain: no pain            Character-            Duration-            Effect-            Factors-            Frequency-            Location-            Severity-    Review of Systems: Reviewed  Per HPI, all other ROS negative    MEDICATIONS  (STANDING):  cefTRIAXone   IVPB 2000 milliGRAM(s) IV Intermittent every 24 hours  diltiazem    Tablet 30 milliGRAM(s) Oral four times a day  enoxaparin Injectable 40 milliGRAM(s) SubCutaneous daily  mupirocin 2% Ointment 1 Application(s) Topical every 12 hours    MEDICATIONS  (PRN):  acetaminophen   Tablet .. 650 milliGRAM(s) Oral every 6 hours PRN Temp greater or equal to 38C (100.4F), Mild Pain (1 - 3), Moderate Pain (4 - 6)  bisacodyl Suppository 10 milliGRAM(s) Rectal every 24 hours PRN Constipation  diltiazem Injectable 5 milliGRAM(s) IV Push every 4 hours PRN HR>130 sustained  haloperidol    Injectable 1 milliGRAM(s) IntraMuscular every 6 hours PRN Agitation  ondansetron Injectable 4 milliGRAM(s) IV Push every 6 hours PRN Nausea and/or Vomiting      Vital Signs Last 24 Hrs  T(C): 36.3 (30 Dec 2020 10:49), Max: 36.3 (29 Dec 2020 22:36)  T(F): 97.4 (30 Dec 2020 10:49), Max: 97.4 (30 Dec 2020 10:49)  HR: 128 (30 Dec 2020 12:19) (103 - 128)  BP: 122/76 (30 Dec 2020 12:19) (112/64 - 129/77)  BP(mean): --  RR: 20 (30 Dec 2020 10:49) (18 - 20)  SpO2: 94% (30 Dec 2020 10:49) (94% - 100%)    General: alert  oriented x _3    Karnofsky:  %__30    HEENT: normal      Lungs: comfortable    CV: normal      GI: passed swallow eval    : incontinent      MSK: weakness, cellulitis LLE    Skin: cellulitis , reddened skin LLE      No new Labs on 12.31.20    RADIOLOGY & ADDITIONAL STUDIES:    ECHO 12.24.20    Summary:   1. Normal left ventricular internal cavity size.   2. Left ventricular ejection fraction, by visual estimation, is 55 to 60%.   3. Normal global left ventricular systolic function.   4. Spectral Doppler shows pseudonormal pattern of left ventricular myocardial filling (Grade II diastolic dysfunction).   5. Normal right ventricular size and function.   6. Severely enlarged left atrium.   7. Severely enlarged right atrium.   8. Mild thickening of the anterior and posterior mitral valve leaflets.   9. Severe mitral annular calcification.  10.Moderate mitral valve regurgitation.  11. Severe tricuspid regurgitation.  12. Estimated pulmonary artery systolic pressure is 57.5 mmHg assuming a right atrial pressure of 15 mmHg, which is consistent with moderate pulmonary hypertension.  13. Severe aortic valve stenosis.  14. Mild to moderate aortic regurgitation.  15. Peak transaortic gradient equals 66.3 mmHg, mean transaortic gradient equals 35.8 mmHg, the calculated aortic valve area equals 0.63 cm² by the continuity equation consistent with severe aortic stenosis.  16. The Dimesionless Index value is 0.21.  17. There is no evidence of pericardial effusion.    12.23.20 CT head  IMPRESSION:    CT head: No acute intracranial hemorrhage or mass effect.    CT cervical spine:  1.  No evidencefor acute displaced fracture or malalignment.  2.  Patchy groundglass opacities in the bilateral upper lungs.    ADVANCE DIRECTIVES:   DNR DNI MOLST

## 2020-12-31 NOTE — PROGRESS NOTE ADULT - PROBLEM SELECTOR PLAN 3
Extensive cardiac history  Diastolic HF, severe TR and AS  ECHO completed this admission.  Hospice Appropriate

## 2020-12-31 NOTE — HOSPICE CARE NOTE - DME DETAILS
Oxygen with 5 liter concentrator and nasal cannula - at daughter Arianna's request, oxygen to be delivered to patient's home on Saturday morning. This writer will give signout to [facility] weekend staff to confirm oxygen delivery and to coordinate ambulance  from [facility]. Home hospice team aware that patient will need an admission visit on Saturday in the late afternoon/early evening.     PLEASE CALL [facility] HOSPICE CARE NETWORK NUMBER, [phone], TO CONFIRM SATURDAY MORNING DME DELIVERY AND TO COORDINATE PATIENT'S TRANSPORTATION HOME FROM [facility].

## 2020-12-31 NOTE — PROGRESS NOTE ADULT - ASSESSMENT
89 years old female with PMH of Recurrent Falls, Thoracic Compression Fractures, Diastolic Heart Failure, Severe TR, LE Edema, HTN and [place] brought by EMS after she was found on the floor. As per daughter, patient lives alone and uses walker for ambulation. Daughter visits her three times a week and on Monday she was doing well. When she went to see her, she was found on kitchen floor. She was alert and awake and was not complaining of anything. No obvious injury. She could not pick her up so she called EMS.  She was recently treated for Pneumonia and Cellulitis in Lower Extremities.   In ER, she was hypothermic, no obvious injury was found. Labs showed leukocytosis, elevated lactate / CPK / LFTs / BNP. Troponin mildly elevated. She was given NS 1500 ml. CT Head and C. Spine without any acute findings except patchy ground glass opacities in the bilateral upper lungs.     strep bacteremia, LLE cellulitis     ceftriaxone until 1/8/21 per ID  via midline, will switch on discharge to oral to complete course    LE duplex negative    acute metabolic encephalopathy: likely due to infection, ?baseline dementia ---intermittent     monitor, avoid sedatives    CT head negative for acute pathology    rhabdo: resolved    AFib with RVR: echo preserved EF, severe TR, severe AS    increase Cardizem to 60 mg TID with hold parameters    no a/c per cardio    Recurrent Falls  - PT Eval recommends [facility] vs 24/7 assist at home    elevated Troponin    likely demand    Chronic Diastolic Heart Failure  - Volume overload - continue IV lasix, change to oral on dc    dysphagia: speech f/u - advanced to puree with nectar    GOC: DNR/I.    DVT Prophylaxis -- Lovenox 40 mg SQ    palliative consulted - Daughter agreeable to home hospice. Arranged for Saturday 89 years old female with PMH of Recurrent Falls, Thoracic Compression Fractures, Diastolic Heart Failure, Severe TR, LE Edema, HTN and [place] brought by EMS after she was found on the floor. As per daughter, patient lives alone and uses walker for ambulation. Daughter visits her three times a week and on Monday she was doing well. When she went to see her, she was found on kitchen floor. She was alert and awake and was not complaining of anything. No obvious injury. She could not pick her up so she called EMS.  She was recently treated for Pneumonia and Cellulitis in Lower Extremities.   In ER, she was hypothermic, no obvious injury was found. Labs showed leukocytosis, elevated lactate / CPK / LFTs / BNP. Troponin mildly elevated. She was given NS 1500 ml. CT Head and C. Spine without any acute findings except patchy ground glass opacities in the bilateral upper lungs.     strep bacteremia, LLE cellulitis     ceftriaxone until 1/8/21 per ID  via midline, will switch on discharge to oral to complete course    LE duplex negative    acute metabolic encephalopathy: likely due to infection, ?baseline dementia ---intermittent     monitor, avoid sedatives    CT head negative for acute pathology    rhabdo: resolved    AFib with RVR: echo preserved EF, severe TR, severe AS    increase Cardizem to 60 mg TID with hold parameters    no a/c per cardio    Recurrent Falls  - PT Eval recommends [facility] vs 24/7 assist at home    elevated Troponin    likely demand    acute on Chronic Diastolic Heart Failure  - Volume overload - continue IV lasix, change to oral on dc    dysphagia: speech f/u - advanced to puree with nectar    GOC: DNR/I.    DVT Prophylaxis -- Lovenox 40 mg SQ    palliative consulted - Daughter agreeable to home hospice. Arranged for Saturday

## 2020-12-31 NOTE — HOSPICE CARE NOTE - CONVESATION DETAILS
Home hospice referral received. [facility] RN spoke with Dr. Benjamin to determine whether patient will complete full course of IV antibiotics (Ceftriaxone) until January 8th as planned - not able to accommodate this with home hospice. ? d/c to [facility] to complete IV antibiotic course versus d/c antibiotics or switch to oral prior to d/c and straight home with hospice. Dr. Benjamin will collaborate with ID and advise. Will continue to follow and will reach out to patients daughter once a firmer plan in place. 
[facility] RN spoke with patient's daughter/HCP, Arianna, regarding home hospice. All aspects of home hospice services explained in detail, with good return understanding. All questions answered; emotional support provided. Daughter Arianna agreed to home hospice services upon patient's d/c from [facility] on Saturday, 1/2/21.

## 2020-12-31 NOTE — PROGRESS NOTE ADULT - SUBJECTIVE AND OBJECTIVE BOX
CC: Recurrent Falls, Thoracic Compression Fractures, Diastolic Heart Failure, Severe TR        INTERVAL HPI/OVERNIGHT EVENTS: Patient seen and examined. Ha placed yesterday for retention. Denies chest pain, SOB. C/O "feeling cold". Otherwise appears comfortable.    Vital Signs Last 24 Hrs  T(C): 36.4 (31 Dec 2020 07:26), Max: 36.9 (30 Dec 2020 16:43)  T(F): 97.6 (31 Dec 2020 07:26), Max: 98.4 (30 Dec 2020 16:43)  HR: 106 (31 Dec 2020 13:26) (105 - 122)  BP: 116/73 (31 Dec 2020 13:26) (93/61 - 116/73)  BP(mean): --  RR: 20 (31 Dec 2020 13:26) (18 - 20)  SpO2: 94% (31 Dec 2020 13:26) (92% - 98%)    PHYSICAL EXAM:  GENERAL: Elderly, frail, NAD  HEAD:  Atraumatic, Normocephalic  NECK: Supple, No JVD, Normal thyroid  NERVOUS SYSTEM:  Alert & Oriented X3, forgetful,   CHEST/LUNG: Bibasilar crackles  HEART: Irregular rate and rhythm; No murmurs, rubs, or gallops  ABDOMEN: Soft, Nontender, Nondistended; Bowel sounds present  EXTREMITIES:  +edema      I&O's Detail    30 Dec 2020 07:01  -  31 Dec 2020 07:00  --------------------------------------------------------  IN:  Total IN: 0 mL    OUT:    Intermittent Catheterization - Urethral (mL): 750 mL    Voided (mL): 1225 mL  Total OUT: 1975 mL    Total NET: -1975 mL                                    11.3   4.90  )-----------( 221      ( 30 Dec 2020 06:33 )             39.6     30 Dec 2020 06:33    140    |  101    |  17.0   ----------------------------<  112    4.6     |  31.0   |  0.71     Ca    8.5        30 Dec 2020 06:33  Phos  2.4       30 Dec 2020 06:33  Mg     2.3       30 Dec 2020 06:33        CAPILLARY BLOOD GLUCOSE          Urinalysis Basic - ( 30 Dec 2020 12:36 )    Color: Yellow / Appearance: Clear / S.010 / pH: x  Gluc: x / Ketone: Negative  / Bili: Negative / Urobili: Negative mg/dL   Blood: x / Protein: 15 mg/dL / Nitrite: Negative   Leuk Esterase: Negative / RBC: Negative /HPF / WBC Negative   Sq Epi: x / Non Sq Epi: Occasional / Bacteria: Negative        MEDICATIONS  (STANDING):  cefTRIAXone   IVPB 2000 milliGRAM(s) IV Intermittent every 24 hours  diltiazem    Tablet 30 milliGRAM(s) Oral four times a day  enoxaparin Injectable 40 milliGRAM(s) SubCutaneous daily  furosemide   Injectable 40 milliGRAM(s) IV Push daily  mupirocin 2% Ointment 1 Application(s) Topical every 12 hours  tamsulosin 0.4 milliGRAM(s) Oral at bedtime    MEDICATIONS  (PRN):  acetaminophen   Tablet .. 650 milliGRAM(s) Oral every 6 hours PRN Temp greater or equal to 38C (100.4F), Mild Pain (1 - 3), Moderate Pain (4 - 6)  bisacodyl Suppository 10 milliGRAM(s) Rectal every 24 hours PRN Constipation  diltiazem Injectable 5 milliGRAM(s) IV Push every 4 hours PRN HR>130 sustained  haloperidol    Injectable 1 milliGRAM(s) IntraMuscular every 6 hours PRN Agitation  ondansetron Injectable 4 milliGRAM(s) IV Push every 6 hours PRN Nausea and/or Vomiting      RADIOLOGY & ADDITIONAL TESTS:

## 2020-12-31 NOTE — PROGRESS NOTE ADULT - ASSESSMENT
This is a 89 year old s/p fall Found to have LLE cellulitis - strep bacteremia started on IV abx by ID until 1/8/21. Patient's mental status now improving - likely due to infection - CT head negative. Echo significant for preserved EF, severe TR and AS (cardiology following). Patient now with dysphagia speech and swallow eval recommendations for NPO and re-evaluation. Palliative consult to discuss comfort feeds vs PEG with patient's surrogate, daughter. See palliative care encounter below for details.  This is a 89 year old s/p fall Found to have LLE cellulitis - now planned for home hospice

## 2020-12-31 NOTE — PROGRESS NOTE ADULT - PROBLEM SELECTOR PLAN 1
ID following, continue with IV abx Ceftriaxone  Monitor for fevers. ID following, continue with IV abx Ceftriaxone  Plan to transition to PO antibiotics , then home with hospice (if Arianna is agreeable after speaking with the Dr. Benjamin)

## 2020-12-31 NOTE — PROGRESS NOTE ADULT - PROBLEM SELECTOR PLAN 4
-Discussed case with RN Georgina Noble from [facility] to discuss patient for home hospice - Georgina is going to reach out to Arianna later today to discuss services and obtain consents.   - Patient at bedside is in no distress but is eager to go home. Reassured the patient the team is actively trying to get her home by coordinating with Hospice Nurse to get her there- she was appreciative - Patient was hungry and thirsty - spoke to [facility] who was actively planning on getting the patient food and beverage  -Will continue to support and monitor  - Code status: DNR DNI   -Surrogate: Arianna Oneil daughter [phone]  - Discussed case with Dr. eBnjamin as well    Total Time Spent__25__ minutes  This includes chart review, patient assessment, discussion and collaboration with interdisciplinary team members, ACP planning      Thank you for the opportunity to assist with the care of this patient.   [facility] [phone].

## 2021-01-01 ENCOUNTER — TRANSCRIPTION ENCOUNTER (OUTPATIENT)
Age: 86
End: 2021-01-01

## 2021-01-01 LAB
ANION GAP SERPL CALC-SCNC: 14 MMOL/L — SIGNIFICANT CHANGE UP (ref 5–17)
BUN SERPL-MCNC: 18 MG/DL — SIGNIFICANT CHANGE UP (ref 8–20)
CALCIUM SERPL-MCNC: 8.6 MG/DL — SIGNIFICANT CHANGE UP (ref 8.6–10.2)
CHLORIDE SERPL-SCNC: 98 MMOL/L — SIGNIFICANT CHANGE UP (ref 98–107)
CO2 SERPL-SCNC: 27 MMOL/L — SIGNIFICANT CHANGE UP (ref 22–29)
CREAT SERPL-MCNC: 0.83 MG/DL — SIGNIFICANT CHANGE UP (ref 0.5–1.3)
GLUCOSE SERPL-MCNC: 122 MG/DL — HIGH (ref 70–99)
HCT VFR BLD CALC: 40.2 % — SIGNIFICANT CHANGE UP (ref 34.5–45)
HGB BLD-MCNC: 11.8 G/DL — SIGNIFICANT CHANGE UP (ref 11.5–15.5)
MAGNESIUM SERPL-MCNC: 2 MG/DL — SIGNIFICANT CHANGE UP (ref 1.6–2.6)
MCHC RBC-ENTMCNC: 26.8 PG — LOW (ref 27–34)
MCHC RBC-ENTMCNC: 29.4 GM/DL — LOW (ref 32–36)
MCV RBC AUTO: 91.2 FL — SIGNIFICANT CHANGE UP (ref 80–100)
PHOSPHATE SERPL-MCNC: 2.3 MG/DL — LOW (ref 2.4–4.7)
PLATELET # BLD AUTO: 249 K/UL — SIGNIFICANT CHANGE UP (ref 150–400)
POTASSIUM SERPL-MCNC: 4.1 MMOL/L — SIGNIFICANT CHANGE UP (ref 3.5–5.3)
POTASSIUM SERPL-SCNC: 4.1 MMOL/L — SIGNIFICANT CHANGE UP (ref 3.5–5.3)
RBC # BLD: 4.41 M/UL — SIGNIFICANT CHANGE UP (ref 3.8–5.2)
RBC # FLD: 16.3 % — HIGH (ref 10.3–14.5)
SODIUM SERPL-SCNC: 138 MMOL/L — SIGNIFICANT CHANGE UP (ref 135–145)
WBC # BLD: 6.57 K/UL — SIGNIFICANT CHANGE UP (ref 3.8–10.5)
WBC # FLD AUTO: 6.57 K/UL — SIGNIFICANT CHANGE UP (ref 3.8–10.5)

## 2021-01-01 PROCEDURE — 71045 X-RAY EXAM CHEST 1 VIEW: CPT | Mod: 26

## 2021-01-01 PROCEDURE — 99232 SBSQ HOSP IP/OBS MODERATE 35: CPT

## 2021-01-01 RX ORDER — SODIUM,POTASSIUM PHOSPHATES 278-250MG
1 POWDER IN PACKET (EA) ORAL
Refills: 0 | Status: DISCONTINUED | OUTPATIENT
Start: 2021-01-01 | End: 2021-01-03

## 2021-01-01 RX ORDER — DILTIAZEM HCL 120 MG
60 CAPSULE, EXT RELEASE 24 HR ORAL EVERY 6 HOURS
Refills: 0 | Status: DISCONTINUED | OUTPATIENT
Start: 2021-01-01 | End: 2021-01-03

## 2021-01-01 RX ADMIN — Medication 60 MILLIGRAM(S): at 23:36

## 2021-01-01 RX ADMIN — Medication 60 MILLIGRAM(S): at 06:54

## 2021-01-01 RX ADMIN — MUPIROCIN 1 APPLICATION(S): 20 OINTMENT TOPICAL at 16:51

## 2021-01-01 RX ADMIN — CEFTRIAXONE 100 MILLIGRAM(S): 500 INJECTION, POWDER, FOR SOLUTION INTRAMUSCULAR; INTRAVENOUS at 13:51

## 2021-01-01 RX ADMIN — Medication 60 MILLIGRAM(S): at 16:51

## 2021-01-01 RX ADMIN — Medication 60 MILLIGRAM(S): at 11:10

## 2021-01-01 RX ADMIN — TAMSULOSIN HYDROCHLORIDE 0.4 MILLIGRAM(S): 0.4 CAPSULE ORAL at 21:25

## 2021-01-01 RX ADMIN — ENOXAPARIN SODIUM 40 MILLIGRAM(S): 100 INJECTION SUBCUTANEOUS at 11:11

## 2021-01-01 RX ADMIN — Medication 40 MILLIGRAM(S): at 06:55

## 2021-01-01 RX ADMIN — MUPIROCIN 1 APPLICATION(S): 20 OINTMENT TOPICAL at 06:54

## 2021-01-01 NOTE — DISCHARGE NOTE PROVIDER - CARE PROVIDER_API CALL
Christie Ramirez)  Infectious Disease; Internal Medicine  [address], [address]  [place] [address]  Phone: [phone]  Fax: [phone]  Follow Up Time:     Rosalio Lama; MPH)  Cardiology; Internal Medicine  [address] [place] [address]  Phone: [phone]  Fax: [phone]  Follow Up Time:

## 2021-01-01 NOTE — DISCHARGE NOTE PROVIDER - CARE PROVIDERS DIRECT ADDRESSES
[email or web address]

## 2021-01-01 NOTE — PROGRESS NOTE ADULT - ASSESSMENT
89 years old female with PMH of Recurrent Falls, Thoracic Compression Fractures, Diastolic Heart Failure, Severe TR, LE Edema, HTN and [place] brought by EMS after she was found on the floor. As per daughter, patient lives alone and uses walker for ambulation. Daughter visits her three times a week and on Monday she was doing well. When she went to see her, she was found on kitchen floor. She was alert and awake and was not complaining of anything. No obvious injury. She could not pick her up so she called EMS.  She was recently treated for Pneumonia and Cellulitis in Lower Extremities.   In ER, she was hypothermic, no obvious injury was found. Labs showed leukocytosis, elevated lactate / CPK / LFTs / BNP. Troponin mildly elevated. She was given NS 1500 ml. CT Head and C. Spine without any acute findings except patchy ground glass opacities in the bilateral upper lungs.     strep bacteremia, LLE cellulitis     ceftriaxone until 1/8/21 per ID  via midline, will switch on discharge to oral to complete course    LE duplex negative    acute metabolic encephalopathy: likely due to infection, ?baseline dementia ---intermittent     monitor, avoid sedatives    CT head negative for acute pathology    rhabdo: resolved    AFib with RVR: echo preserved EF, severe TR, severe AS    increase Cardizem to 60 mg qid with hold parameters    no a/c per cardio    Recurrent Falls  - PT Eval recommends [facility] vs 24/7 assist at home    elevated Troponin    likely demand    acute on Chronic Diastolic Heart Failure  - Volume overload - continue IV lasix, change to oral on dc    dysphagia: speech f/u - advanced to puree with nectar    GOC: DNR/I.    DVT Prophylaxis -- Lovenox 40 mg SQ    palliative consulted - Daughter agreeable to home hospice. Arranged for Saturday    dc planning for am.

## 2021-01-01 NOTE — DISCHARGE NOTE PROVIDER - NSDCFUADDAPPT_GEN_ALL_CORE_FT
STAR DOCUMENTATION:  PLAN IS FOR home hospice   Cardiac Appt danilo/ Santos [phone] 1/12/20 3:30 pm

## 2021-01-01 NOTE — DISCHARGE NOTE PROVIDER - NSDCCPCAREPLAN_GEN_ALL_CORE_FT
PRINCIPAL DISCHARGE DIAGNOSIS  Diagnosis: Bacteremia  Assessment and Plan of Treatment: finish course of antibiotics  local wound care to lower legs.  follow up with infectious disease if needed      SECONDARY DISCHARGE DIAGNOSES  Diagnosis: Urinary retention  Assessment and Plan of Treatment: maintain toussaint catheter      Diagnosis: Dysphagia  Assessment and Plan of Treatment: comfort feeds    Diagnosis: Atrial fibrillation with RVR  Assessment and Plan of Treatment: continue diltiazem  follow up with cardiology as needed    Diagnosis: Acute on chronic diastolic heart failure due to valvular disease  Assessment and Plan of Treatment: continue lasix.    Diagnosis: Traumatic rhabdomyolysis, initial encounter  Assessment and Plan of Treatment: resolved.     PRINCIPAL DISCHARGE DIAGNOSIS  Diagnosis: Bacteremia  Assessment and Plan of Treatment: finish course of antibiotics  local wound care to lower legs.  follow up with infectious disease if needed      SECONDARY DISCHARGE DIAGNOSES  Diagnosis: Urinary retention  Assessment and Plan of Treatment: maintain toussaint catheter  on tamsulosin    Diagnosis: Dysphagia  Assessment and Plan of Treatment: Maintain the modified consistency diet    Diagnosis: Atrial fibrillation with RVR  Assessment and Plan of Treatment: continue diltiazem  follow up with cardiology as needed    Diagnosis: Acute on chronic diastolic heart failure due to valvular disease  Assessment and Plan of Treatment: continue lasix.    Diagnosis: Traumatic rhabdomyolysis, initial encounter  Assessment and Plan of Treatment: resolved.

## 2021-01-01 NOTE — DISCHARGE NOTE PROVIDER - HOSPITAL COURSE
89 years old female with PMH of Recurrent Falls, Thoracic Compression Fractures, Diastolic Heart Failure, Severe TR, LE Edema, HTN and [place] brought by EMS after she was found on the floor. As per daughter, patient lives alone and uses walker for ambulation. Daughter visits her three times a week and on Monday she was doing well. When she went to see her, she was found on kitchen floor. She was alert and awake and was not complaining of anything. No obvious injury. She could not pick her up so she called EMS.  She was recently treated for Pneumonia and Cellulitis in Lower Extremities.   In ER, she was hypothermic, no obvious injury was found. Labs showed leukocytosis, elevated lactate / CPK / LFTs / BNP. Troponin mildly elevated. She was given NS 1500 ml. CT Head and C. Spine without any acute findings except patchy ground glass opacities in the bilateral upper lungs.     Treated for LLE cellulitis (after unaboots removed) and strep bacteremia with IV antibiotics. ID recommending ceftriaxone until 1/8.  LE duplex negative  patient noted to have periods of dysphagia but subsequently passed swallow evaluation.  Afib with RVR improved with diltiazem along with acute on chronic valvular diastolic HF treated with IV lasix.  no a/c or aggressive intervention for mild trop elevated per cardiology given falls, age and bleeding risk.  found to have urinary retention and toussaint placed as patient minimally ambulatory.    episodes of acute encephalopathy likely due to infection and possible dementia.    Daughter agrees with home hospice and patient to be discharged with hospice services.             89 years old female with PMH of Recurrent Falls, Thoracic Compression Fractures, Diastolic Heart Failure, Severe TR, LE Edema, HTN and [place] brought by EMS after she was found on the floor. As per daughter, patient lives alone and uses walker for ambulation. Daughter visits her three times a week and on Monday she was doing well. When she went to see her, she was found on kitchen floor. She was alert and awake and was not complaining of anything. No obvious injury. She could not pick her up so she called EMS.  She was recently treated for Pneumonia and Cellulitis in Lower Extremities.   In ER, she was hypothermic, no obvious injury was found. Labs showed leukocytosis, elevated lactate / CPK / LFTs / BNP. Troponin mildly elevated. She was given NS 1500 ml. CT Head and C. Spine without any acute findings except patchy ground glass opacities in the bilateral upper lungs.     Treated for LLE cellulitis (after unaboots removed) and strep bacteremia with IV antibiotics. ID recommending ceftriaxone.  LE duplex negative  patient noted to have periods of dysphagia but subsequently passed swallow evaluation and was continued on a modified consistency diet.  Afib with RVR improved with diltiazem along with acute on chronic valvular diastolic HF treated with IV lasix.  no a/c or aggressive intervention for mild trop elevated per cardiology given falls, age and bleeding risk.  found to have urinary retention and toussaint placed as patient minimally ambulatory.    episodes of acute encephalopathy likely due to infection and possible dementia.    Daughter agrees with home hospice and patient to be discharged with hospice services.            35 minutes total time

## 2021-01-01 NOTE — DISCHARGE NOTE PROVIDER - DETAILS OF MALNUTRITION DIAGNOSIS/DIAGNOSES
This patient has been assessed with a concern for Malnutrition and was treated during this hospitalization for the following Nutrition diagnosis/diagnoses:     -  12/27/2020: Moderate protein-calorie malnutrition   This patient has been assessed with a concern for Malnutrition and was treated during this hospitalization for the following Nutrition diagnosis/diagnoses:     -  12/27/2020: Moderate protein-calorie malnutrition    This patient has been assessed with a concern for Malnutrition and was treated during this hospitalization for the following Nutrition diagnosis/diagnoses:     -  12/27/2020: Moderate protein-calorie malnutrition

## 2021-01-01 NOTE — DISCHARGE NOTE PROVIDER - NSDCMRMEDTOKEN_GEN_ALL_CORE_FT
ascorbic acid 500 mg oral tablet: 1 tab(s) orally once a day  aspirin 81 mg oral tablet, chewable: 1 tab(s) orally once a day  ferrous sulfate 325 mg (65 mg elemental iron) oral tablet: 1 tab(s) orally once a day  folic acid: 1 milligram(s) orally once a day  furosemide 40 mg oral tablet: 1 tab(s) orally once a day  Multiple Vitamins oral tablet: 1 tab(s) orally once a day  thiamine 100 mg oral tablet: 1 tab(s) orally once a day   ascorbic acid 500 mg oral tablet: 1 tab(s) orally once a day  aspirin 81 mg oral tablet, chewable: 1 tab(s) orally once a day  Cardizem  mg/24 hours oral capsule, extended release: 1 cap(s) orally once a day   cefpodoxime 200 mg oral tablet: 1 tab(s) orally every 12 hours   ferrous sulfate 325 mg (65 mg elemental iron) oral tablet: 1 tab(s) orally once a day  folic acid: 1 milligram(s) orally once a day  furosemide 40 mg oral tablet: 1 tab(s) orally 2 times a day   Multiple Vitamins oral tablet: 1 tab(s) orally once a day  tamsulosin 0.4 mg oral capsule: 1 cap(s) orally once a day (at bedtime)  thiamine 100 mg oral tablet: 1 tab(s) orally once a day

## 2021-01-01 NOTE — PROGRESS NOTE ADULT - SUBJECTIVE AND OBJECTIVE BOX
seen for afib, bacteremia, cellulitis    no acute complaints  agitated about diet restrictions.  wants to go home  ros negative    MEDICATIONS  (STANDING):  diltiazem    Tablet 60 milliGRAM(s) Oral every 6 hours  enoxaparin Injectable 40 milliGRAM(s) SubCutaneous daily  furosemide   Injectable 40 milliGRAM(s) IV Push daily  mupirocin 2% Ointment 1 Application(s) Topical every 12 hours  potassium phosphate / sodium phosphate Powder (PHOS-NaK) 1 Packet(s) Oral three times a day before meals  tamsulosin 0.4 milliGRAM(s) Oral at bedtime    MEDICATIONS  (PRN):  acetaminophen   Tablet .. 650 milliGRAM(s) Oral every 6 hours PRN Temp greater or equal to 38C (100.4F), Mild Pain (1 - 3), Moderate Pain (4 - 6)  bisacodyl Suppository 10 milliGRAM(s) Rectal every 24 hours PRN Constipation  diltiazem Injectable 5 milliGRAM(s) IV Push every 4 hours PRN HR>130 sustained  haloperidol    Injectable 1 milliGRAM(s) IntraMuscular every 6 hours PRN Agitation  ondansetron Injectable 4 milliGRAM(s) IV Push every 6 hours PRN Nausea and/or Vomiting      Allergies    No Known Allergies      Vital Signs Last 24 Hrs  T(C): 36.8 (01 Jan 2021 07:59), Max: 36.8 (01 Jan 2021 07:59)  T(F): 98.2 (01 Jan 2021 07:59), Max: 98.2 (01 Jan 2021 07:59)  HR: 122 (01 Jan 2021 07:59) (120 - 122)  BP: 108/65 (01 Jan 2021 07:59) (108/65 - 122/74)  BP(mean): --  RR: 20 (01 Jan 2021 07:59) (20 - 20)  SpO2: 100% (01 Jan 2021 07:59) (99% - 100%)    PHYSICAL EXAM:    GENERAL: NAD  CHEST/LUNG: crackles right base  HEART: irreg irreg rate and rhythm; S1 S2  ABDOMEN: Soft, ; Bowel sounds present  EXTREMITIES:  no edema. cracked skin lower legs b/l   NERVOUS SYSTEM:  Alert & Oriented X1 gen weakness  : + toussaint clear urine    LABS:                        11.8   6.57  )-----------( 249      ( 01 Jan 2021 10:00 )             40.2     01-01    138  |  98  |  18.0  ----------------------------<  122<H>  4.1   |  27.0  |  0.83    Ca    8.6      01 Jan 2021 10:00  Phos  2.3     01-01  Mg     2.0     01-01            CAPILLARY BLOOD GLUCOSE            RADIOLOGY & ADDITIONAL TESTS:

## 2021-01-02 PROCEDURE — 99232 SBSQ HOSP IP/OBS MODERATE 35: CPT

## 2021-01-02 RX ORDER — CEFTRIAXONE 500 MG/1
2000 INJECTION, POWDER, FOR SOLUTION INTRAMUSCULAR; INTRAVENOUS EVERY 24 HOURS
Refills: 0 | Status: DISCONTINUED | OUTPATIENT
Start: 2021-01-03 | End: 2021-01-03

## 2021-01-02 RX ORDER — SCOPALAMINE 1 MG/3D
1 PATCH, EXTENDED RELEASE TRANSDERMAL
Refills: 0 | Status: DISCONTINUED | OUTPATIENT
Start: 2021-01-02 | End: 2021-01-03

## 2021-01-02 RX ORDER — CEFTRIAXONE 500 MG/1
INJECTION, POWDER, FOR SOLUTION INTRAMUSCULAR; INTRAVENOUS
Refills: 0 | Status: DISCONTINUED | OUTPATIENT
Start: 2021-01-02 | End: 2021-01-03

## 2021-01-02 RX ORDER — CEFTRIAXONE 500 MG/1
INJECTION, POWDER, FOR SOLUTION INTRAMUSCULAR; INTRAVENOUS
Refills: 0 | Status: DISCONTINUED | OUTPATIENT
Start: 2021-01-02 | End: 2021-01-02

## 2021-01-02 RX ORDER — MORPHINE SULFATE 50 MG/1
1 CAPSULE, EXTENDED RELEASE ORAL ONCE
Refills: 0 | Status: DISCONTINUED | OUTPATIENT
Start: 2021-01-02 | End: 2021-01-02

## 2021-01-02 RX ORDER — CEFTRIAXONE 500 MG/1
2000 INJECTION, POWDER, FOR SOLUTION INTRAMUSCULAR; INTRAVENOUS ONCE
Refills: 0 | Status: COMPLETED | OUTPATIENT
Start: 2021-01-02 | End: 2021-01-02

## 2021-01-02 RX ADMIN — ENOXAPARIN SODIUM 40 MILLIGRAM(S): 100 INJECTION SUBCUTANEOUS at 12:08

## 2021-01-02 RX ADMIN — Medication 1 PACKET(S): at 06:04

## 2021-01-02 RX ADMIN — TAMSULOSIN HYDROCHLORIDE 0.4 MILLIGRAM(S): 0.4 CAPSULE ORAL at 22:50

## 2021-01-02 RX ADMIN — Medication 40 MILLIGRAM(S): at 06:04

## 2021-01-02 RX ADMIN — Medication 1 PACKET(S): at 17:26

## 2021-01-02 RX ADMIN — CEFTRIAXONE 100 MILLIGRAM(S): 500 INJECTION, POWDER, FOR SOLUTION INTRAMUSCULAR; INTRAVENOUS at 12:31

## 2021-01-02 RX ADMIN — Medication 1 PACKET(S): at 12:07

## 2021-01-02 RX ADMIN — Medication 60 MILLIGRAM(S): at 17:26

## 2021-01-02 RX ADMIN — SCOPALAMINE 1 PATCH: 1 PATCH, EXTENDED RELEASE TRANSDERMAL at 17:26

## 2021-01-02 RX ADMIN — MUPIROCIN 1 APPLICATION(S): 20 OINTMENT TOPICAL at 17:27

## 2021-01-02 RX ADMIN — MORPHINE SULFATE 1 MILLIGRAM(S): 50 CAPSULE, EXTENDED RELEASE ORAL at 17:26

## 2021-01-02 RX ADMIN — Medication 60 MILLIGRAM(S): at 12:07

## 2021-01-02 RX ADMIN — MUPIROCIN 1 APPLICATION(S): 20 OINTMENT TOPICAL at 06:04

## 2021-01-02 RX ADMIN — Medication 60 MILLIGRAM(S): at 22:50

## 2021-01-02 RX ADMIN — MORPHINE SULFATE 1 MILLIGRAM(S): 50 CAPSULE, EXTENDED RELEASE ORAL at 14:44

## 2021-01-02 RX ADMIN — SCOPALAMINE 1 PATCH: 1 PATCH, EXTENDED RELEASE TRANSDERMAL at 23:34

## 2021-01-02 RX ADMIN — MORPHINE SULFATE 1 MILLIGRAM(S): 50 CAPSULE, EXTENDED RELEASE ORAL at 14:22

## 2021-01-02 NOTE — PROGRESS NOTE ADULT - SUBJECTIVE AND OBJECTIVE BOX
BRIAN SANDOVAL  ----------------------------------------  The patient was seen and evaluated for bacteremia. Offers no complaints.    Vital Signs Last 24 Hrs  T(C): 35.7 (02 Jan 2021 07:11), Max: 36.4 (01 Jan 2021 16:19)  T(F): 96.2 (02 Jan 2021 07:11), Max: 97.5 (01 Jan 2021 16:19)  HR: 67 (02 Jan 2021 07:11) (67 - 120)  BP: 100/63 (02 Jan 2021 07:11) (99/58 - 102/68)  BP(mean): --  RR: 18 (02 Jan 2021 07:11) (18 - 18)  SpO2: 98% (02 Jan 2021 07:11) (98% - 98%)    PHYSICAL EXAMINATION:  ----------------------------------------  General appearance: No acute distress, Awake, Alert  HEENT: Normocephalic, Atraumatic, Conjunctiva clear, EOMI  Neck: Supple, No JVD, No tenderness  Lungs: Breath sound equal bilaterally, No wheezes, Minimal rales  Cardiovascular: S1S2, Regular rhythm  Abdomen: Soft, Nontender, Nondistended, No guarding/rebound, Positive bowel sounds  Extremities: No clubbing, No cyanosis, No edema, No calf tenderness, Chronic skin changes  Neuro: Strength equal bilaterally, No tremors  Psychiatric: Appropriate mood, Normal affect    LABORATORY STUDIES:  ----------------------------------------             11.8   6.57  )-----------( 249      ( 01 Jan 2021 10:00 )             40.2     01-01    138  |  98  |  18.0  ----------------------------<  122<H>  4.1   |  27.0  |  0.83    Ca    8.6      01 Jan 2021 10:00  Phos  2.3     01-01  Mg     2.0     01-01    Culture - Urine (collected 31 Dec 2020 03:56)  Source: .Urine Catheterized  Final Report (31 Dec 2020 22:45):    No growth    Culture - Blood (collected 30 Dec 2020 14:00)  Source: .Blood Blood-Peripheral  Preliminary Report (01 Jan 2021 15:00):    No growth at 48 hours    MEDICATIONS  (STANDING):  diltiazem    Tablet 60 milliGRAM(s) Oral every 6 hours  enoxaparin Injectable 40 milliGRAM(s) SubCutaneous daily  furosemide   Injectable 40 milliGRAM(s) IV Push daily  mupirocin 2% Ointment 1 Application(s) Topical every 12 hours  potassium phosphate / sodium phosphate Powder (PHOS-NaK) 1 Packet(s) Oral three times a day before meals  tamsulosin 0.4 milliGRAM(s) Oral at bedtime    MEDICATIONS  (PRN):  acetaminophen   Tablet .. 650 milliGRAM(s) Oral every 6 hours PRN Temp greater or equal to 38C (100.4F), Mild Pain (1 - 3), Moderate Pain (4 - 6)  bisacodyl Suppository 10 milliGRAM(s) Rectal every 24 hours PRN Constipation  diltiazem Injectable 5 milliGRAM(s) IV Push every 4 hours PRN HR>130 sustained  haloperidol    Injectable 1 milliGRAM(s) IntraMuscular every 6 hours PRN Agitation  ondansetron Injectable 4 milliGRAM(s) IV Push every 6 hours PRN Nausea and/or Vomiting      ASSESSMENT / PLAN:  ----------------------------------------  89 years old female with PMH of Recurrent Falls, Thoracic Compression Fractures, Diastolic Heart Failure, Severe TR, LE Edema, HTN and [place] brought by EMS after she was found on the floor. As per daughter, patient lives alone and uses walker for ambulation. Daughter visits her three times a week and on Monday she was doing well. When she went to see her, she was found on kitchen floor. She was alert and awake and was not complaining of anything. No obvious injury. She could not pick her up so she called EMS.  She was recently treated for Pneumonia and Cellulitis in Lower Extremities.   In ER, she was hypothermic, no obvious injury was found. Labs showed leukocytosis, elevated lactate / CPK / LFTs / BNP. Troponin mildly elevated. She was given NS 1500 ml. CT Head and C. Spine without any acute findings except patchy ground glass opacities in the bilateral upper lungs.     Strep bacteremia / Cellulitis - The course of antibiotics was completed. Repeat blood cultures were without growth. No leukocytosis.    Metabolic encephalopathy - CT of the head was without acute intracranial pathology. Treated for bacteremia with antibiotics.    Falls / Rhabdomyolysis - CPK levels normalized.    Atrial fibrillation - On diltiazem. Thought to be a poor candidate for long term anticoagulation.    Urine retention - Ha catheter in place and draining clear urine. On tamsulosin.    Acute on chronic diastolic heart failure - On furosemide.    Dysphagia - On a modified consistency.    Pending home hospice care.

## 2021-01-02 NOTE — PROGRESS NOTE ADULT - ATTENDING COMMENTS
Anticipate discharge in 24-48 hours pending home hospice services
AFib with RVR, adjusted diltiazem dose  acute diastolic HF, valvular disease: IV lasix, CXR in am  cellulitis bacteremia: daughter agreeable with oral antibiotics on discharge for home hospice.  urinary retention: toussaint, flomax. likely dc with toussaint given poor mobilization.      d/w daughter in detail. planned for dc home with home hospice saturday as equipment to be delivered and daughter preparing home for patient.
seen earlier in the day ~10 am  passed swallow eval  distended abdomen, bladder scan with retention needs straight caths and or toussaint  crackles bibasilar, chest ray, iv lasi  hold IVF    home hospice referral.
vitals stable, no longer hypothermic.  no gross evidence of pna. check ua/ucx  monitor off abx.  confused aaox1 but follows commands. 4/5 strength x4. b/l unaboots. rrr s1s2 ctab soft abdomen.  rhabdo improving. trop leak, trend, check echo  cardiology following.  need to find out outpatient vascular surgeon who manages unaboots.  may need change or evaluation for underlying cellulitis.    history of falls, likely cannot live alone for safety reasons.  PT evaluation.

## 2021-01-02 NOTE — CHART NOTE - NSCHARTNOTEFT_GEN_A_CORE
Source: Patient [ ]  Family [ ]   other [X] EMR    Current Diet: Diet, DASH/TLC:   Sodium & Cholesterol Restricted  Dysphagia 1, Pureed, Nectar Consistency Fluid (DYS1NC) (12-30-20 @ 10:32)    Patient reports [ ] nausea  [ ] vomiting [ ] diarrhea [ ] constipation  [ ]chewing problems [X] swallowing issues  [ ] other:     PO intake:  50% [X]   50-75%  [ ]   %  [ ]  other :    Source for PO intake [ ] Patient [ ] family [X] chart [ ] staff [ ] other    Current Weight:   No new weight. Dependent trace edema, 2+ b/l leg edema noted. Obtain daily weight to monitor trend.    Pertinent Medications: MEDICATIONS  (STANDING):  cefTRIAXone   IVPB      diltiazem    Tablet 60 milliGRAM(s) Oral every 6 hours  enoxaparin Injectable 40 milliGRAM(s) SubCutaneous daily  furosemide   Injectable 40 milliGRAM(s) IV Push daily  morphine  - Injectable 1 milliGRAM(s) IV Push once  mupirocin 2% Ointment 1 Application(s) Topical every 12 hours  potassium phosphate / sodium phosphate Powder (PHOS-NaK) 1 Packet(s) Oral three times a day before meals  scopolamine 1 mG/72 Hr(s) Patch 1 Patch Transdermal every 72 hours  tamsulosin 0.4 milliGRAM(s) Oral at bedtime    MEDICATIONS  (PRN):  acetaminophen   Tablet .. 650 milliGRAM(s) Oral every 6 hours PRN Temp greater or equal to 38C (100.4F), Mild Pain (1 - 3), Moderate Pain (4 - 6)  bisacodyl Suppository 10 milliGRAM(s) Rectal every 24 hours PRN Constipation  diltiazem Injectable 5 milliGRAM(s) IV Push every 4 hours PRN HR>130 sustained  haloperidol    Injectable 1 milliGRAM(s) IntraMuscular every 6 hours PRN Agitation  ondansetron Injectable 4 milliGRAM(s) IV Push every 6 hours PRN Nausea and/or Vomiting    Pertinent Labs: CBC Full  -  ( 01 Jan 2021 10:00 )  WBC Count : 6.57 K/uL  RBC Count : 4.41 M/uL  Hemoglobin : 11.8 g/dL  Hematocrit : 40.2 %  Platelet Count - Automated : 249 K/uL  Mean Cell Volume : 91.2 fl  Mean Cell Hemoglobin : 26.8 pg  Mean Cell Hemoglobin Concentration : 29.4 gm/dL  Auto Neutrophil # : x  Auto Lymphocyte # : x  Auto Monocyte # : x  Auto Eosinophil # : x  Auto Basophil # : x  Auto Neutrophil % : x  Auto Lymphocyte % : x  Auto Monocyte % : x  Auto Eosinophil % : x  Auto Basophil % : x    Skin: As per EMR, sacrum stage 2, L. heel suspected DTI noted.    Nutrition focused physical exam previously conducted - found signs of malnutrition [ ]absent [X]present    Subcutaneous fat loss: [ ] Orbital fat pads region, [ ]Buccal fat region, [ ]Triceps region,  [ ]Ribs region    Muscle wasting: [X]Temples region, [X]Clavicle region, [ ]Shoulder region, [ ]Scapula region, [ ]Interosseous region,  [ ]thigh region, [ ]Calf region    Estimated Needs:   [X] no change since previous assessment  [ ] recalculated:     Current Nutrition Diagnosis: Pt remains at high nutrition risk secondary to malnutrition (moderate chronic) related to inadequate protein calorie intake as evidenced by PO<75% estimated nutrition needs and physical findings. Per SLP evaluation 12/30, puree, nectar thick liquids as tolerated recommended. Aware pending home hospice care. RD to remain available.    Recommendations:   1. Follow SLP recommendation. Monitor tolerance to diet.   2. [place] pt/pt family wishes regarding nutrition/hydration.   3. Obtain weight as feasible.  4. Recommend Ensure Pudding TID to optimize PO intake.   5. Monitor labs.  6. Provide assistance with meals.    Monitoring and Evaluation:   [X] PO intake [X] Tolerance to diet prescription [X] Weights  [X] Follow up per protocol [X] Labs

## 2021-01-03 VITALS
HEART RATE: 85 BPM | SYSTOLIC BLOOD PRESSURE: 98 MMHG | DIASTOLIC BLOOD PRESSURE: 60 MMHG | OXYGEN SATURATION: 96 % | TEMPERATURE: 98 F | RESPIRATION RATE: 18 BRPM

## 2021-01-03 PROCEDURE — 99239 HOSP IP/OBS DSCHRG MGMT >30: CPT

## 2021-01-03 RX ORDER — TAMSULOSIN HYDROCHLORIDE 0.4 MG/1
1 CAPSULE ORAL
Qty: 30 | Refills: 0
Start: 2021-01-03 | End: 2021-02-01

## 2021-01-03 RX ORDER — DILTIAZEM HCL 120 MG
1 CAPSULE, EXT RELEASE 24 HR ORAL
Qty: 30 | Refills: 0
Start: 2021-01-03 | End: 2021-02-01

## 2021-01-03 RX ORDER — FUROSEMIDE 40 MG
1 TABLET ORAL
Qty: 0 | Refills: 0 | DISCHARGE

## 2021-01-03 RX ORDER — CEFPODOXIME PROXETIL 100 MG
1 TABLET ORAL
Qty: 10 | Refills: 0
Start: 2021-01-03 | End: 2021-01-07

## 2021-01-03 RX ORDER — FUROSEMIDE 40 MG
1 TABLET ORAL
Qty: 60 | Refills: 0
Start: 2021-01-03 | End: 2021-02-01

## 2021-01-03 RX ADMIN — Medication 1 PACKET(S): at 10:59

## 2021-01-03 RX ADMIN — Medication 1 PACKET(S): at 05:53

## 2021-01-03 RX ADMIN — Medication 40 MILLIGRAM(S): at 05:53

## 2021-01-03 RX ADMIN — CEFTRIAXONE 100 MILLIGRAM(S): 500 INJECTION, POWDER, FOR SOLUTION INTRAMUSCULAR; INTRAVENOUS at 10:58

## 2021-01-03 RX ADMIN — MUPIROCIN 1 APPLICATION(S): 20 OINTMENT TOPICAL at 05:53

## 2021-01-03 NOTE — PROGRESS NOTE ADULT - SUBJECTIVE AND OBJECTIVE BOX
BRIAN NAJERASISSY  ----------------------------------------  The patient was seen and evaluated for bacteremia. Reports some dyspnea. Denied chest pain.    Vital Signs Last 24 Hrs  T(C): 36.4 (03 Jan 2021 09:14), Max: 36.4 (02 Jan 2021 16:42)  T(F): 97.5 (03 Jan 2021 09:14), Max: 97.6 (02 Jan 2021 16:42)  HR: 92 (03 Jan 2021 09:14) (55 - 135)  BP: 99/58 (03 Jan 2021 09:14) (99/58 - 125/69)  BP(mean): --  RR: 18 (03 Jan 2021 09:14) (18 - 20)  SpO2: 99% (03 Jan 2021 09:14) (95% - 99%)    PHYSICAL EXAMINATION:  ----------------------------------------  General appearance: No acute distress, Awake, Alert  HEENT: Normocephalic, Atraumatic, Conjunctiva clear, EOMI  Neck: Supple, No JVD, No tenderness  Lungs: Breath sound equal bilaterally, No wheezes, Minimal rales  Cardiovascular: S1S2, Regular rhythm  Abdomen: Soft, Nontender, Nondistended, No guarding/rebound, Positive bowel sounds  Extremities: No clubbing, No cyanosis, No edema, No calf tenderness, Chronic skin changes  Neuro: Strength equal bilaterally, No tremors  Psychiatric: Appropriate mood, Normal affect    LABORATORY STUDIES:  ----------------------------------------             11.8   6.57  )-----------( 249      ( 01 Jan 2021 10:00 )             40.2     01-01    138  |  98  |  18.0  ----------------------------<  122<H>  4.1   |  27.0  |  0.83    Ca    8.6      01 Jan 2021 10:00  Phos  2.3     01-01  Mg     2.0     01-01    MEDICATIONS  (STANDING):  cefTRIAXone   IVPB      cefTRIAXone   IVPB 2000 milliGRAM(s) IV Intermittent every 24 hours  diltiazem    Tablet 60 milliGRAM(s) Oral every 6 hours  enoxaparin Injectable 40 milliGRAM(s) SubCutaneous daily  furosemide   Injectable 40 milliGRAM(s) IV Push daily  mupirocin 2% Ointment 1 Application(s) Topical every 12 hours  potassium phosphate / sodium phosphate Powder (PHOS-NaK) 1 Packet(s) Oral three times a day before meals  scopolamine 1 mG/72 Hr(s) Patch 1 Patch Transdermal every 72 hours  tamsulosin 0.4 milliGRAM(s) Oral at bedtime    MEDICATIONS  (PRN):  acetaminophen   Tablet .. 650 milliGRAM(s) Oral every 6 hours PRN Temp greater or equal to 38C (100.4F), Mild Pain (1 - 3), Moderate Pain (4 - 6)  bisacodyl Suppository 10 milliGRAM(s) Rectal every 24 hours PRN Constipation  diltiazem Injectable 5 milliGRAM(s) IV Push every 4 hours PRN HR>130 sustained  haloperidol    Injectable 1 milliGRAM(s) IntraMuscular every 6 hours PRN Agitation  ondansetron Injectable 4 milliGRAM(s) IV Push every 6 hours PRN Nausea and/or Vomiting      ASSESSMENT / PLAN:  ----------------------------------------  89 years old female with PMH of Recurrent Falls, Thoracic Compression Fractures, Diastolic Heart Failure, Severe TR, LE Edema, HTN and [place] brought by EMS after she was found on the floor. As per daughter, patient lives alone and uses walker for ambulation. Daughter visits her three times a week and on Monday she was doing well. When she went to see her, she was found on kitchen floor. She was alert and awake and was not complaining of anything. No obvious injury. She could not pick her up so she called EMS.  She was recently treated for Pneumonia and Cellulitis in Lower Extremities.   In ER, she was hypothermic, no obvious injury was found. Labs showed leukocytosis, elevated lactate / CPK / LFTs / BNP. Troponin mildly elevated. She was given NS 1500 ml. CT Head and C. Spine without any acute findings except patchy ground glass opacities in the bilateral upper lungs.     Strep bacteremia / Cellulitis - Repeat blood cultures were without growth. No leukocytosis.    Metabolic encephalopathy - CT of the head was without acute intracranial pathology. Treated for bacteremia with antibiotics. Awake and alert on examination.    Falls / Rhabdomyolysis - CPK levels normalized.    Atrial fibrillation - On diltiazem. Thought to be a poor candidate for long term anticoagulation.    Urine retention - Ha catheter in place and draining clear urine. On tamsulosin.    Acute on chronic diastolic heart failure - On furosemide. Borderline hypotension.    Dysphagia - On a modified consistency.    Pending home hospice care.

## 2021-01-03 NOTE — PROGRESS NOTE ADULT - PROVIDER SPECIALTY LIST ADULT
Cardiology
Hospitalist
Cardiology
Hospitalist
Infectious Disease
Cardiology
Cardiology
Hospitalist
Infectious Disease
Hospitalist
Palliative Care
Palliative Care

## 2021-01-04 LAB
CULTURE RESULTS: SIGNIFICANT CHANGE UP
SPECIMEN SOURCE: SIGNIFICANT CHANGE UP

## 2021-01-06 LAB
CULTURE RESULTS: SIGNIFICANT CHANGE UP
SPECIMEN SOURCE: SIGNIFICANT CHANGE UP

## 2021-01-12 ENCOUNTER — APPOINTMENT (OUTPATIENT)
Dept: CARDIOLOGY | Facility: CLINIC | Age: 86
End: 2021-01-12

## 2021-01-18 PROCEDURE — 72125 CT NECK SPINE W/O DYE: CPT

## 2021-01-18 PROCEDURE — 36415 COLL VENOUS BLD VENIPUNCTURE: CPT

## 2021-01-18 PROCEDURE — 86769 SARS-COV-2 COVID-19 ANTIBODY: CPT

## 2021-01-18 PROCEDURE — 85027 COMPLETE CBC AUTOMATED: CPT

## 2021-01-18 PROCEDURE — 70450 CT HEAD/BRAIN W/O DYE: CPT

## 2021-01-18 PROCEDURE — 97163 PT EVAL HIGH COMPLEX 45 MIN: CPT

## 2021-01-18 PROCEDURE — 93005 ELECTROCARDIOGRAM TRACING: CPT

## 2021-01-18 PROCEDURE — 84484 ASSAY OF TROPONIN QUANT: CPT

## 2021-01-18 PROCEDURE — U0003: CPT

## 2021-01-18 PROCEDURE — 92610 EVALUATE SWALLOWING FUNCTION: CPT

## 2021-01-18 PROCEDURE — 87040 BLOOD CULTURE FOR BACTERIA: CPT

## 2021-01-18 PROCEDURE — 87150 DNA/RNA AMPLIFIED PROBE: CPT

## 2021-01-18 PROCEDURE — 99285 EMERGENCY DEPT VISIT HI MDM: CPT | Mod: 25

## 2021-01-18 PROCEDURE — 71045 X-RAY EXAM CHEST 1 VIEW: CPT

## 2021-01-18 PROCEDURE — 81001 URINALYSIS AUTO W/SCOPE: CPT

## 2021-01-18 PROCEDURE — 82550 ASSAY OF CK (CPK): CPT

## 2021-01-18 PROCEDURE — 83880 ASSAY OF NATRIURETIC PEPTIDE: CPT

## 2021-01-18 PROCEDURE — 83605 ASSAY OF LACTIC ACID: CPT

## 2021-01-18 PROCEDURE — 84100 ASSAY OF PHOSPHORUS: CPT

## 2021-01-18 PROCEDURE — 85730 THROMBOPLASTIN TIME PARTIAL: CPT

## 2021-01-18 PROCEDURE — 80053 COMPREHEN METABOLIC PANEL: CPT

## 2021-01-18 PROCEDURE — 92526 ORAL FUNCTION THERAPY: CPT

## 2021-01-18 PROCEDURE — 93306 TTE W/DOPPLER COMPLETE: CPT

## 2021-01-18 PROCEDURE — 87086 URINE CULTURE/COLONY COUNT: CPT

## 2021-01-18 PROCEDURE — 96361 HYDRATE IV INFUSION ADD-ON: CPT

## 2021-01-18 PROCEDURE — 82553 CREATINE MB FRACTION: CPT

## 2021-01-18 PROCEDURE — 85610 PROTHROMBIN TIME: CPT

## 2021-01-18 PROCEDURE — 96360 HYDRATION IV INFUSION INIT: CPT

## 2021-01-18 PROCEDURE — 80076 HEPATIC FUNCTION PANEL: CPT

## 2021-01-18 PROCEDURE — 72170 X-RAY EXAM OF PELVIS: CPT

## 2021-01-18 PROCEDURE — 80048 BASIC METABOLIC PNL TOTAL CA: CPT

## 2021-01-18 PROCEDURE — 93970 EXTREMITY STUDY: CPT

## 2021-01-18 PROCEDURE — 97530 THERAPEUTIC ACTIVITIES: CPT

## 2021-01-18 PROCEDURE — 83735 ASSAY OF MAGNESIUM: CPT

## 2021-01-18 PROCEDURE — 85025 COMPLETE CBC W/AUTO DIFF WBC: CPT

## 2021-03-01 NOTE — ED PROVIDER NOTE - CPE EDP CARDIAC NORM
normal... Imiquimod Counseling:  I discussed with the patient the risks of imiquimod including but not limited to erythema, scaling, itching, weeping, crusting, and pain.  Patient understands that the inflammatory response to imiquimod is variable from person to person and was educated regarded proper titration schedule.  If flu-like symptoms develop, patient knows to discontinue the medication and contact us.

## 2021-11-05 NOTE — ED ADULT NURSE NOTE - CHPI ED NUR SYMPTOMS NEG
no no fever/no tingling/no vomiting/no loss of consciousness/no numbness/no weakness/no confusion/no deformity

## 2022-02-03 NOTE — PHYSICAL THERAPY INITIAL EVALUATION ADULT - PHYSICAL ASSIST/NONPHYSICAL ASSIST: SIT/STAND, REHAB EVAL
Scheduled procedure with Patient at      Telephone Information:   Mobile [phone]      Scheduled Via: Case Request Order for  [facility]   Procedure date: 3/2/2022   Procedure time: 11:00 AM ; Did patient request this specific time? No    -If non-ambulatory location, select reason: Medically necessary  -Did patient request a specific facility other than MD's preferred facility? No; If so, which facility? n/a  -If a MAC pt is scheduled, pt was notified a family member/friend is need to stay with the patient over night after their procedure: Yes                Notified patient about receiving an animated [place] program? Yes    Was patient informed of COVID testing Yes;    The following have been confirmed:  Insurance name confirmed as R, will be the same at time of procedure?: Yes Ins Accepted at Facility? Yes  Latex Allergy No  Diabetic No  Sleep Apnea No  Diuretic/Water pill No  Defibrillator/Pacemaker No  MRSA hx No  Blood thinners: Coumadin (Warfarin) or Plavix No      Aspirin No      Phentermine (diet pill) No  Constipation No;    Pre-Op testing required No, Patient informed No  Prep required? Yes, [facility] [address] [facility]; Briefly reviewed? Yes; Prep cost range discussed? No  If procedure is scheduled 7 days or less, patient was told to  prep letter?: n/a     2 person assist/verbal cues/nonverbal cues (demo/gestures)

## 2022-04-20 NOTE — DISCHARGE NOTE ADULT - MEDICATION SUMMARY - MEDICATIONS TO CHANGE
I will SWITCH the dose or number of times a day I take the medications listed below when I get home from the hospital:  None
Admission

## 2022-07-02 NOTE — PROCEDURE NOTE - NSMIDLINEBRANDFT_VASC_A_CORE
[place] You can access the FollowMyHealth Patient Portal offered by [facility] by registering at the following website: [email or web address]. By joining [employer]’s FollowMyHealth portal, you will also be able to view your health information using other applications (apps) compatible with our system.

## 2022-11-11 NOTE — ED ADULT NURSE REASSESSMENT NOTE - NS ED NURSE REASSESS COMMENT FT1
Daughter was diagnosed with breast cancer and patient would like to have a stat genetics order put in for herself Pt family here to  pt. Pt with no complaints of pain or discomfort at this time. D/c paperwork provided to pt daughter and information reviewed with pt and daughter at well. Pt and family verbalizes understanding of d/c instructions. PIV removed.

## 2023-06-23 NOTE — PHYSICAL THERAPY INITIAL EVALUATION ADULT - PATIENT/FAMILY AGREES WITH PLAN
Detail Level: Simple Instructions: This plan will send the code FBSE to the PM system.  DO NOT or CHANGE the price. Price (Do Not Change): 0.00 normal... yes/pt

## 2024-12-30 NOTE — PATIENT PROFILE ADULT. - NS TRANSFER EYEGLASSES PAIRS
To Dr. HITCHCOCK--please send in new script for Escitalopram.  Pt also requesting a refill for Xanax.    Spoke with patient. He reports that around the time he called our office (11/25/24), he stopped taking Bupropion and restarted Lexapro 10 mg daily. Reports improvement in anxiety. Plans to continue taking. Has enough medication for a couple weeks. Needs a refill.     To MD:  The above refill request is for a controlled substance.  Please review pended medication order.   Print and sign for staff to fax to pharmacy or prescribe electronically.    Last office visit:  Last time refill sent and quantity/refills: 10/8 #40/1  Medication Dispense History (from 7/3/2024 to 12/27/2024)  Expand All  Collapse All  ALPRAZolam     Dispensed Written Strength Quantity Refills Days Supply Provider Pharmacy   ALPRAZOLAM 10/08/2024 10/08/2024 0.25 mg 40  10 D'AMICO, JEFF A. DO [facility]   ALPRAZOLAM 07/29/2024 07/29/2024 0.25 mg 40  10 D'AMICO, JEFF A. DO [facility]               1 pair

## 2025-03-04 NOTE — DIETITIAN NUTRITION RISK NOTIFICATION - ETIOLOGY-BASIS
[FreeTextEntry1] : Prior note nurse practitioner Nandini 2024::  This is a 46-year-old female with past medical history significant for hypercholesterolemia, hypothyroidism, status post removal of a pituitary adenoma, status post uterine fibroid removal, who comes in for lipid/cardiac follow-up evaluation.  She was born in [facility] and has no history of rheumatic fever. She does not drink excessive caffeine or alcohol.  Cardiac risk factors include hypercholesterolemia.   She reports that her father had hypercholesterolemia, hypertension,  at age 72.   HPI: She is feeling generally well today and denies chest pain, dizziness, heart palpitations, recent episodes of syncope or falls, SOB, or dyspnea at this time.  Current Medications: Atorvastatin 10 mg daily *states that she's been taking this every other day*   Depression screening completed. Discussed negative results with patient during visit. Patient will follow-up with their primary care provider for mental health management if needed in the future.   BLOOD PRESSURE: Controlled.    BLOOD WORK: -New blood work was done 2024 to evaluate lipid profile, CBC, BMP, hepatic function, A1C and TSH. -Lipid panel done 2023 demonstrated triglycerides 106, cholesterol 207, HDL 86, , Non- -Lipid panel done 2023 demonstrated triglycerides 137, cholesterol 196, HDL 81, LDL 92, Non-, LDL direct 97 -Lipid panel done 2023 demonstrated cholesterol 188, triglycerides 157, HDL o86, LDL calculated 70 mg/dL and non-HDL cholesterol 101 mg/dL. -Lipid panel done 2022 demonstrated cholesterol 296, triglycerides 110 mg/dL,, LDL calculated 166 mg/dL and non-HDL cholesterol 188 mg/dL   TESTING/REPORTS: -EKG done 2024 demonstrated sinus bradycardia rate 59 bpm otherwise unremarkable.   -Echocardiogram done today 2024 in office with results pending.  -Electrocardiogram done 2023 demonstrates normal sinus rhythm at a rate of 60 bpm is otherwise unremarkable.  -Echocardiogram done 2023 demonstrated normal left ventricular systolic function EF 64%, physiology mitral regurgitation, mild tricuspid regurgitation, physiologic pulmonic regurgitation.   -The patient had a normal exercise stress test 2023.   PLAN: -She will continue her current medications and contact the office if problems arise before next follow-up appointment. -She will follow-up with her PCP routinely.    I have discussed the plan of care with TADEO DELFINO and she will follow up in 4-6 months. They are compliant with all of their medications.     The patient understands that aerobic exercises must be increased to 40 minutes 4 times/week and a detailed discussion of lifestyle modification was done today.   The patient has a good understanding of the diagnosis, treatment plan and lifestyle modification.   They will contact me at the office for any questions with their care or any changes in their health status.   The patient was discussed with supervision physician Dr. Dae Santos at the time of the visit and the plan of care will be carried out as noted above.     MAIKOL Delatorre NP Chronic illness
